# Patient Record
Sex: MALE | Race: WHITE | NOT HISPANIC OR LATINO | Employment: FULL TIME | URBAN - METROPOLITAN AREA
[De-identification: names, ages, dates, MRNs, and addresses within clinical notes are randomized per-mention and may not be internally consistent; named-entity substitution may affect disease eponyms.]

---

## 2017-01-24 ENCOUNTER — HOSPITAL ENCOUNTER (OUTPATIENT)
Dept: LAB | Facility: HOSPITAL | Age: 52
Setting detail: SPECIMEN
Discharge: HOME | End: 2017-01-24
Attending: INTERNAL MEDICINE | Admitting: INTERNAL MEDICINE

## 2017-01-30 ENCOUNTER — CONVERSION ENCOUNTER (OUTPATIENT)
Dept: CARDIOLOGY | Facility: CLINIC | Age: 52
End: 2017-01-30

## 2018-03-11 ENCOUNTER — HOSPITAL ENCOUNTER (INPATIENT)
Facility: HOSPITAL | Age: 53
LOS: 8 days | Discharge: HOME | End: 2018-03-20
Attending: EMERGENCY MEDICINE | Admitting: INTERNAL MEDICINE

## 2018-03-11 ENCOUNTER — APPOINTMENT (OUTPATIENT)
Dept: GENERAL RADIOLOGY | Facility: HOSPITAL | Age: 53
End: 2018-03-11

## 2018-03-11 DIAGNOSIS — I46.9 CARDIAC ARREST WITH VENTRICULAR FIBRILLATION (HCC): Primary | ICD-10-CM

## 2018-03-11 DIAGNOSIS — F19.10 SUBSTANCE ABUSE (HCC): ICD-10-CM

## 2018-03-11 DIAGNOSIS — R41.82 ALTERED MENTAL STATUS, UNSPECIFIED ALTERED MENTAL STATUS TYPE: ICD-10-CM

## 2018-03-11 DIAGNOSIS — E87.20 LACTIC ACIDOSIS: ICD-10-CM

## 2018-03-11 DIAGNOSIS — I48.91 ATRIAL FIBRILLATION, UNSPECIFIED TYPE (HCC): ICD-10-CM

## 2018-03-11 DIAGNOSIS — I49.01 CARDIAC ARREST WITH VENTRICULAR FIBRILLATION (HCC): Primary | ICD-10-CM

## 2018-03-11 PROCEDURE — 99291 CRITICAL CARE FIRST HOUR: CPT

## 2018-03-11 PROCEDURE — 25010000002 EPINEPHRINE PF 1 MG/10ML SOLUTION PREFILLED SYRINGE: Performed by: EMERGENCY MEDICINE

## 2018-03-11 PROCEDURE — 92950 HEART/LUNG RESUSCITATION CPR: CPT

## 2018-03-11 PROCEDURE — 94799 UNLISTED PULMONARY SVC/PX: CPT

## 2018-03-11 PROCEDURE — 93005 ELECTROCARDIOGRAM TRACING: CPT | Performed by: EMERGENCY MEDICINE

## 2018-03-11 PROCEDURE — 71045 X-RAY EXAM CHEST 1 VIEW: CPT

## 2018-03-11 PROCEDURE — 51703 INSERT BLADDER CATH COMPLEX: CPT

## 2018-03-11 PROCEDURE — 5A1945Z RESPIRATORY VENTILATION, 24-96 CONSECUTIVE HOURS: ICD-10-PCS | Performed by: INTERNAL MEDICINE

## 2018-03-11 PROCEDURE — 94002 VENT MGMT INPAT INIT DAY: CPT

## 2018-03-11 PROCEDURE — 25010000002 AMIODARONE PER 30 MG: Performed by: EMERGENCY MEDICINE

## 2018-03-11 PROCEDURE — 0BH17EZ INSERTION OF ENDOTRACHEAL AIRWAY INTO TRACHEA, VIA NATURAL OR ARTIFICIAL OPENING: ICD-10-PCS | Performed by: INTERNAL MEDICINE

## 2018-03-11 PROCEDURE — 25010000002 MAGNESIUM SULFATE PER 500 MG OF MAGNESIUM: Performed by: EMERGENCY MEDICINE

## 2018-03-11 RX ORDER — DEXTROSE MONOHYDRATE 25 G/50ML
INJECTION, SOLUTION INTRAVENOUS
Status: COMPLETED | OUTPATIENT
Start: 2018-03-11 | End: 2018-03-11

## 2018-03-11 RX ORDER — SODIUM CHLORIDE 0.9 % (FLUSH) 0.9 %
10 SYRINGE (ML) INJECTION AS NEEDED
Status: DISCONTINUED | OUTPATIENT
Start: 2018-03-11 | End: 2018-03-20 | Stop reason: HOSPADM

## 2018-03-11 RX ORDER — SODIUM CHLORIDE 9 MG/ML
INJECTION, SOLUTION INTRAVENOUS
Status: COMPLETED | OUTPATIENT
Start: 2018-03-11 | End: 2018-03-11

## 2018-03-11 RX ADMIN — MAGNESIUM SULFATE HEPTAHYDRATE 2 G: 500 INJECTION, SOLUTION INTRAMUSCULAR; INTRAVENOUS at 23:34

## 2018-03-11 RX ADMIN — EPINEPHRINE 1 MG: 0.1 INJECTION, SOLUTION ENDOTRACHEAL; INTRACARDIAC; INTRAVENOUS at 23:28

## 2018-03-11 RX ADMIN — SODIUM CHLORIDE 1000 ML/HR: 9 INJECTION, SOLUTION INTRAVENOUS at 23:21

## 2018-03-11 RX ADMIN — EPINEPHRINE 1 MG: 0.1 INJECTION, SOLUTION ENDOTRACHEAL; INTRACARDIAC; INTRAVENOUS at 23:23

## 2018-03-11 RX ADMIN — AMIODARONE HYDROCHLORIDE 300 MG: 50 INJECTION, SOLUTION INTRAVENOUS at 23:27

## 2018-03-11 RX ADMIN — SODIUM CHLORIDE 1000 ML/HR: 9 INJECTION, SOLUTION INTRAVENOUS at 23:51

## 2018-03-11 RX ADMIN — DEXTROSE MONOHYDRATE 25 ML: 25 INJECTION, SOLUTION INTRAVENOUS at 23:22

## 2018-03-12 ENCOUNTER — APPOINTMENT (OUTPATIENT)
Dept: GENERAL RADIOLOGY | Facility: HOSPITAL | Age: 53
End: 2018-03-12
Attending: INTERNAL MEDICINE

## 2018-03-12 ENCOUNTER — APPOINTMENT (OUTPATIENT)
Dept: CT IMAGING | Facility: HOSPITAL | Age: 53
End: 2018-03-12

## 2018-03-12 ENCOUNTER — APPOINTMENT (OUTPATIENT)
Dept: GENERAL RADIOLOGY | Facility: HOSPITAL | Age: 53
End: 2018-03-12

## 2018-03-12 ENCOUNTER — APPOINTMENT (OUTPATIENT)
Dept: CARDIOLOGY | Facility: HOSPITAL | Age: 53
End: 2018-03-12
Attending: INTERNAL MEDICINE

## 2018-03-12 PROBLEM — I49.01 CARDIAC ARREST WITH VENTRICULAR FIBRILLATION (HCC): Status: ACTIVE | Noted: 2018-03-12

## 2018-03-12 PROBLEM — I46.9 CARDIAC ARREST WITH VENTRICULAR FIBRILLATION (HCC): Status: ACTIVE | Noted: 2018-03-12

## 2018-03-12 LAB
ALBUMIN SERPL-MCNC: 3.2 G/DL (ref 3.5–5.2)
ALBUMIN SERPL-MCNC: 3.7 G/DL (ref 3.5–5.2)
ALBUMIN SERPL-MCNC: 3.7 G/DL (ref 3.5–5.2)
ALBUMIN SERPL-MCNC: 3.9 G/DL (ref 3.5–5.2)
ALBUMIN/GLOB SERPL: 1.3 G/DL
ALBUMIN/GLOB SERPL: 1.6 G/DL
ALP SERPL-CCNC: 96 U/L (ref 39–117)
ALP SERPL-CCNC: 99 U/L (ref 39–117)
ALT SERPL W P-5'-P-CCNC: 130 U/L (ref 1–41)
ALT SERPL W P-5'-P-CCNC: 140 U/L (ref 1–41)
AMPHET+METHAMPHET UR QL: NEGATIVE
ANION GAP SERPL CALCULATED.3IONS-SCNC: 15.3 MMOL/L
ANION GAP SERPL CALCULATED.3IONS-SCNC: 18.6 MMOL/L
ANION GAP SERPL CALCULATED.3IONS-SCNC: 18.8 MMOL/L
ANION GAP SERPL CALCULATED.3IONS-SCNC: 26.3 MMOL/L
AORTIC ARCH: 3.2 CM
AORTIC DIMENSIONLESS INDEX: 0.6 (DI)
APTT PPP: 35.4 SECONDS (ref 22.7–35.4)
APTT PPP: 35.6 SECONDS (ref 22.7–35.4)
ARTERIAL PATENCY WRIST A: POSITIVE
ARTERIAL PATENCY WRIST A: POSITIVE
ASCENDING AORTA: 3.4 CM
AST SERPL-CCNC: 152 U/L (ref 1–40)
AST SERPL-CCNC: 180 U/L (ref 1–40)
ATMOSPHERIC PRESS: 748.3 MMHG
ATMOSPHERIC PRESS: 750.7 MMHG
BARBITURATES UR QL SCN: NEGATIVE
BASE EXCESS BLDA CALC-SCNC: -18.8 MMOL/L (ref 0–2)
BASE EXCESS BLDA CALC-SCNC: -8.6 MMOL/L (ref 0–2)
BASOPHILS # BLD AUTO: 0.07 10*3/MM3 (ref 0–0.2)
BASOPHILS # BLD AUTO: 0.09 10*3/MM3 (ref 0–0.2)
BASOPHILS NFR BLD AUTO: 0.4 % (ref 0–1.5)
BASOPHILS NFR BLD AUTO: 0.5 % (ref 0–1.5)
BDY SITE: ABNORMAL
BDY SITE: ABNORMAL
BENZODIAZ UR QL SCN: NEGATIVE
BH CV ECHO MEAS - ACS: 2.8 CM
BH CV ECHO MEAS - AO ARCH DIAM (PROXIMAL TRANS.): 3.2 CM
BH CV ECHO MEAS - AO MEAN PG (FULL): 1 MMHG
BH CV ECHO MEAS - AO MEAN PG: 3 MMHG
BH CV ECHO MEAS - AO ROOT AREA (BSA CORRECTED): 1.3
BH CV ECHO MEAS - AO ROOT AREA: 10.2 CM^2
BH CV ECHO MEAS - AO ROOT DIAM: 3.6 CM
BH CV ECHO MEAS - AO V2 MAX: 116 CM/SEC
BH CV ECHO MEAS - AO V2 MEAN: 80.2 CM/SEC
BH CV ECHO MEAS - AO V2 VTI: 22.2 CM
BH CV ECHO MEAS - ASC AORTA: 3.4 CM
BH CV ECHO MEAS - AVA(I,A): 2.8 CM^2
BH CV ECHO MEAS - AVA(I,D): 2.8 CM^2
BH CV ECHO MEAS - BSA(HAYCOCK): 2.9 M^2
BH CV ECHO MEAS - BSA: 2.8 M^2
BH CV ECHO MEAS - BZI_BMI: 35.4 KILOGRAMS/M^2
BH CV ECHO MEAS - BZI_METRIC_HEIGHT: 200.7 CM
BH CV ECHO MEAS - BZI_METRIC_WEIGHT: 142.4 KG
BH CV ECHO MEAS - CONTRAST EF (2CH): 15.3 ML/M^2
BH CV ECHO MEAS - CONTRAST EF 4CH: 13.5 ML/M^2
BH CV ECHO MEAS - EDV(CUBED): 262.1 ML
BH CV ECHO MEAS - EDV(MOD-SP2): 261 ML
BH CV ECHO MEAS - EDV(MOD-SP4): 296 ML
BH CV ECHO MEAS - EDV(TEICH): 208.5 ML
BH CV ECHO MEAS - EF(CUBED): 17.6 %
BH CV ECHO MEAS - EF(MOD-SP2): 15.3 %
BH CV ECHO MEAS - EF(MOD-SP4): 13.5 %
BH CV ECHO MEAS - EF(TEICH): 13.7 %
BH CV ECHO MEAS - ESV(CUBED): 216 ML
BH CV ECHO MEAS - ESV(MOD-SP2): 221 ML
BH CV ECHO MEAS - ESV(MOD-SP4): 256 ML
BH CV ECHO MEAS - ESV(TEICH): 180 ML
BH CV ECHO MEAS - FS: 6.3 %
BH CV ECHO MEAS - IVS/LVPW: 1
BH CV ECHO MEAS - IVSD: 1.3 CM
BH CV ECHO MEAS - LV DIASTOLIC VOL/BSA (35-75): 107.2 ML/M^2
BH CV ECHO MEAS - LV MASS(C)D: 389 GRAMS
BH CV ECHO MEAS - LV MASS(C)DI: 140.9 GRAMS/M^2
BH CV ECHO MEAS - LV MEAN PG: 2 MMHG
BH CV ECHO MEAS - LV SYSTOLIC VOL/BSA (12-30): 92.7 ML/M^2
BH CV ECHO MEAS - LV V1 MAX: 86 CM/SEC
BH CV ECHO MEAS - LV V1 MEAN: 59 CM/SEC
BH CV ECHO MEAS - LV V1 VTI: 13.5 CM
BH CV ECHO MEAS - LVIDD: 6.4 CM
BH CV ECHO MEAS - LVIDS: 6 CM
BH CV ECHO MEAS - LVLD AP2: 9.5 CM
BH CV ECHO MEAS - LVLD AP4: 9.2 CM
BH CV ECHO MEAS - LVLS AP2: 8.9 CM
BH CV ECHO MEAS - LVLS AP4: 10.2 CM
BH CV ECHO MEAS - LVOT AREA (M): 4.5 CM^2
BH CV ECHO MEAS - LVOT AREA: 4.5 CM^2
BH CV ECHO MEAS - LVOT DIAM: 2.4 CM
BH CV ECHO MEAS - LVPWD: 1.3 CM
BH CV ECHO MEAS - MV DEC SLOPE: 373 CM/SEC^2
BH CV ECHO MEAS - MV DEC TIME: 0.25 SEC
BH CV ECHO MEAS - MV E MAX VEL: 53.3 CM/SEC
BH CV ECHO MEAS - MV MEAN PG: 1 MMHG
BH CV ECHO MEAS - MV P1/2T MAX VEL: 59.7 CM/SEC
BH CV ECHO MEAS - MV P1/2T: 46.9 MSEC
BH CV ECHO MEAS - MV V2 MEAN: 45.8 CM/SEC
BH CV ECHO MEAS - MV V2 VTI: 9.3 CM
BH CV ECHO MEAS - MVA P1/2T LCG: 3.7 CM^2
BH CV ECHO MEAS - MVA(P1/2T): 4.7 CM^2
BH CV ECHO MEAS - MVA(VTI): 6.6 CM^2
BH CV ECHO MEAS - PA ACC SLOPE: 12.1 CM/SEC^2
BH CV ECHO MEAS - PA ACC TIME: 0.09 SEC
BH CV ECHO MEAS - PA MAX PG (FULL): -0.12 MMHG
BH CV ECHO MEAS - PA MAX PG: 1.6 MMHG
BH CV ECHO MEAS - PA PR(ACCEL): 38.5 MMHG
BH CV ECHO MEAS - PA V2 MAX: 63 CM/SEC
BH CV ECHO MEAS - PVA(V,A): 3.9 CM^2
BH CV ECHO MEAS - PVA(V,D): 3.9 CM^2
BH CV ECHO MEAS - QP/QS: 0.78
BH CV ECHO MEAS - RV MAX PG: 1.7 MMHG
BH CV ECHO MEAS - RV MEAN PG: 1 MMHG
BH CV ECHO MEAS - RV V1 MAX: 65.4 CM/SEC
BH CV ECHO MEAS - RV V1 MEAN: 48.3 CM/SEC
BH CV ECHO MEAS - RV V1 VTI: 12.6 CM
BH CV ECHO MEAS - RVOT AREA: 3.8 CM^2
BH CV ECHO MEAS - RVOT DIAM: 2.2 CM
BH CV ECHO MEAS - SI(AO): 81.9 ML/M^2
BH CV ECHO MEAS - SI(CUBED): 16.7 ML/M^2
BH CV ECHO MEAS - SI(LVOT): 22.1 ML/M^2
BH CV ECHO MEAS - SI(MOD-SP2): 14.5 ML/M^2
BH CV ECHO MEAS - SI(MOD-SP4): 14.5 ML/M^2
BH CV ECHO MEAS - SI(TEICH): 10.3 ML/M^2
BH CV ECHO MEAS - SV(AO): 226 ML
BH CV ECHO MEAS - SV(CUBED): 46.1 ML
BH CV ECHO MEAS - SV(LVOT): 61.1 ML
BH CV ECHO MEAS - SV(MOD-SP2): 40 ML
BH CV ECHO MEAS - SV(MOD-SP4): 40 ML
BH CV ECHO MEAS - SV(RVOT): 47.9 ML
BH CV ECHO MEAS - SV(TEICH): 28.5 ML
BH CV ECHO MEAS - TAPSE (>1.6): 2.6 CM2
BH CV VAS BP RIGHT ARM: NORMAL MMHG
BH CV XLRA - RV BASE: 4 CM
BH CV XLRA - TDI S': 13.7 CM/SEC
BILIRUB SERPL-MCNC: 0.6 MG/DL (ref 0.1–1.2)
BILIRUB SERPL-MCNC: 1 MG/DL (ref 0.1–1.2)
BUN BLD-MCNC: 23 MG/DL (ref 6–20)
BUN BLD-MCNC: 32 MG/DL (ref 6–20)
BUN BLD-MCNC: 39 MG/DL (ref 6–20)
BUN BLD-MCNC: 46 MG/DL (ref 6–20)
BUN/CREAT SERPL: 11.9 (ref 7–25)
BUN/CREAT SERPL: 14.2 (ref 7–25)
BUN/CREAT SERPL: 16.5 (ref 7–25)
BUN/CREAT SERPL: 19.2 (ref 7–25)
CA-I BLD-MCNC: 4.7 MG/DL (ref 4.6–5.4)
CA-I SERPL ISE-MCNC: 1.17 MMOL/L (ref 1.15–1.35)
CALCIUM SPEC-SCNC: 7.7 MG/DL (ref 8.6–10.5)
CALCIUM SPEC-SCNC: 8 MG/DL (ref 8.6–10.5)
CALCIUM SPEC-SCNC: 8.1 MG/DL (ref 8.6–10.5)
CALCIUM SPEC-SCNC: 8.5 MG/DL (ref 8.6–10.5)
CANNABINOIDS SERPL QL: POSITIVE
CHLORIDE SERPL-SCNC: 100 MMOL/L (ref 98–107)
CHLORIDE SERPL-SCNC: 100 MMOL/L (ref 98–107)
CHLORIDE SERPL-SCNC: 94 MMOL/L (ref 98–107)
CHLORIDE SERPL-SCNC: 96 MMOL/L (ref 98–107)
CHLORIDE UR-SCNC: 38 MMOL/L
CO2 SERPL-SCNC: 16.2 MMOL/L (ref 22–29)
CO2 SERPL-SCNC: 16.7 MMOL/L (ref 22–29)
CO2 SERPL-SCNC: 17.4 MMOL/L (ref 22–29)
CO2 SERPL-SCNC: 20.7 MMOL/L (ref 22–29)
COCAINE UR QL: POSITIVE
CREAT BLD-MCNC: 1.93 MG/DL (ref 0.76–1.27)
CREAT BLD-MCNC: 2.25 MG/DL (ref 0.76–1.27)
CREAT BLD-MCNC: 2.36 MG/DL (ref 0.76–1.27)
CREAT BLD-MCNC: 2.39 MG/DL (ref 0.76–1.27)
CREAT UR-MCNC: 65 MG/DL
D-LACTATE SERPL-SCNC: 2.1 MMOL/L (ref 0.5–2)
D-LACTATE SERPL-SCNC: 3.8 MMOL/L (ref 0.5–2)
D-LACTATE SERPL-SCNC: 5.6 MMOL/L (ref 0.5–2)
D-LACTATE SERPL-SCNC: 9.4 MMOL/L (ref 0.5–2)
DEPRECATED RDW RBC AUTO: 48.3 FL (ref 37–54)
DEPRECATED RDW RBC AUTO: 49.7 FL (ref 37–54)
DEPRECATED RDW RBC AUTO: 50 FL (ref 37–54)
EOSINOPHIL # BLD AUTO: 0.2 10*3/MM3 (ref 0–0.7)
EOSINOPHIL # BLD AUTO: 0.24 10*3/MM3 (ref 0–0.7)
EOSINOPHIL NFR BLD AUTO: 1.2 % (ref 0.3–6.2)
EOSINOPHIL NFR BLD AUTO: 1.4 % (ref 0.3–6.2)
ERYTHROCYTE [DISTWIDTH] IN BLOOD BY AUTOMATED COUNT: 13 % (ref 11.5–14.5)
ERYTHROCYTE [DISTWIDTH] IN BLOOD BY AUTOMATED COUNT: 13 % (ref 11.5–14.5)
ERYTHROCYTE [DISTWIDTH] IN BLOOD BY AUTOMATED COUNT: 13.2 % (ref 11.5–14.5)
ETHANOL BLD-MCNC: <10 MG/DL (ref 0–10)
ETHANOL UR QL: <0.01 %
GFR SERPL CREATININE-BSD FRML MDRD: 29 ML/MIN/1.73
GFR SERPL CREATININE-BSD FRML MDRD: 29 ML/MIN/1.73
GFR SERPL CREATININE-BSD FRML MDRD: 31 ML/MIN/1.73
GFR SERPL CREATININE-BSD FRML MDRD: 37 ML/MIN/1.73
GLOBULIN UR ELPH-MCNC: 2.5 GM/DL
GLOBULIN UR ELPH-MCNC: 2.8 GM/DL
GLUCOSE BLD-MCNC: 274 MG/DL (ref 65–99)
GLUCOSE BLD-MCNC: 498 MG/DL (ref 65–99)
GLUCOSE BLD-MCNC: 544 MG/DL (ref 65–99)
GLUCOSE BLD-MCNC: 552 MG/DL (ref 65–99)
GLUCOSE BLDC GLUCOMTR-MCNC: 164 MG/DL (ref 70–130)
GLUCOSE BLDC GLUCOMTR-MCNC: 172 MG/DL (ref 70–130)
GLUCOSE BLDC GLUCOMTR-MCNC: 179 MG/DL (ref 70–130)
GLUCOSE BLDC GLUCOMTR-MCNC: 182 MG/DL (ref 70–130)
GLUCOSE BLDC GLUCOMTR-MCNC: 202 MG/DL (ref 70–130)
GLUCOSE BLDC GLUCOMTR-MCNC: 253 MG/DL (ref 70–130)
GLUCOSE BLDC GLUCOMTR-MCNC: 279 MG/DL (ref 70–130)
GLUCOSE BLDC GLUCOMTR-MCNC: 328 MG/DL (ref 70–130)
GLUCOSE BLDC GLUCOMTR-MCNC: 333 MG/DL (ref 70–130)
GLUCOSE BLDC GLUCOMTR-MCNC: 369 MG/DL (ref 70–130)
GLUCOSE BLDC GLUCOMTR-MCNC: 422 MG/DL (ref 70–130)
GLUCOSE BLDC GLUCOMTR-MCNC: 437 MG/DL (ref 70–130)
GLUCOSE BLDC GLUCOMTR-MCNC: 478 MG/DL (ref 70–130)
GLUCOSE BLDC GLUCOMTR-MCNC: 506 MG/DL (ref 70–130)
HAV IGM SERPL QL IA: NORMAL
HBV CORE IGM SERPL QL IA: NORMAL
HBV SURFACE AG SERPL QL IA: NORMAL
HCO3 BLDA-SCNC: 14.8 MMOL/L (ref 22–28)
HCO3 BLDA-SCNC: 17.8 MMOL/L (ref 22–28)
HCT VFR BLD AUTO: 46.3 % (ref 40.4–52.2)
HCT VFR BLD AUTO: 48.4 % (ref 40.4–52.2)
HCT VFR BLD AUTO: 49.3 % (ref 40.4–52.2)
HCT VFR BLD AUTO: 50.2 % (ref 40.4–52.2)
HCV AB SER DONR QL: NORMAL
HGB BLD-MCNC: 15.3 G/DL (ref 13.7–17.6)
HGB BLD-MCNC: 15.5 G/DL (ref 13.7–17.6)
HGB BLD-MCNC: 15.6 G/DL (ref 13.7–17.6)
HGB BLD-MCNC: 16.7 G/DL (ref 13.7–17.6)
HOLD SPECIMEN: NORMAL
HOROWITZ INDEX BLD+IHG-RTO: 100 %
HOROWITZ INDEX BLD+IHG-RTO: 40 %
IMM GRANULOCYTES # BLD: 0.3 10*3/MM3 (ref 0–0.03)
IMM GRANULOCYTES # BLD: 0.32 10*3/MM3 (ref 0–0.03)
IMM GRANULOCYTES NFR BLD: 1.9 % (ref 0–0.5)
IMM GRANULOCYTES NFR BLD: 1.9 % (ref 0–0.5)
INR PPP: 1.24 (ref 0.9–1.1)
INR PPP: 1.29 (ref 0.9–1.1)
INR PPP: 1.3 (ref 0.9–1.1)
LEFT ATRIUM VOLUME INDEX: 37 ML/M2
LV EF 2D ECHO EST: 14 %
LYMPHOCYTES # BLD AUTO: 5.21 10*3/MM3 (ref 0.9–4.8)
LYMPHOCYTES # BLD AUTO: 5.85 10*3/MM3 (ref 0.9–4.8)
LYMPHOCYTES NFR BLD AUTO: 32.4 % (ref 19.6–45.3)
LYMPHOCYTES NFR BLD AUTO: 34.3 % (ref 19.6–45.3)
MAGNESIUM SERPL-MCNC: 2.9 MG/DL (ref 1.6–2.6)
MAXIMAL PREDICTED HEART RATE: 169 BPM
MCH RBC QN AUTO: 33.1 PG (ref 27–32.7)
MCH RBC QN AUTO: 33.1 PG (ref 27–32.7)
MCH RBC QN AUTO: 33.7 PG (ref 27–32.7)
MCHC RBC AUTO-ENTMCNC: 31.6 G/DL (ref 32.6–36.4)
MCHC RBC AUTO-ENTMCNC: 31.6 G/DL (ref 32.6–36.4)
MCHC RBC AUTO-ENTMCNC: 33.3 G/DL (ref 32.6–36.4)
MCV RBC AUTO: 101.2 FL (ref 79.8–96.2)
MCV RBC AUTO: 104.7 FL (ref 79.8–96.2)
MCV RBC AUTO: 104.8 FL (ref 79.8–96.2)
METHADONE UR QL SCN: NEGATIVE
MODALITY: ABNORMAL
MODALITY: ABNORMAL
MONOCYTES # BLD AUTO: 0.63 10*3/MM3 (ref 0.2–1.2)
MONOCYTES # BLD AUTO: 0.66 10*3/MM3 (ref 0.2–1.2)
MONOCYTES NFR BLD AUTO: 3.9 % (ref 5–12)
MONOCYTES NFR BLD AUTO: 3.9 % (ref 5–12)
NEUTROPHILS # BLD AUTO: 9.65 10*3/MM3 (ref 1.9–8.1)
NEUTROPHILS # BLD AUTO: 9.88 10*3/MM3 (ref 1.9–8.1)
NEUTROPHILS NFR BLD AUTO: 58 % (ref 42.7–76)
NEUTROPHILS NFR BLD AUTO: 60.2 % (ref 42.7–76)
NT-PROBNP SERPL-MCNC: 1533 PG/ML (ref 0–900)
NT-PROBNP SERPL-MCNC: 1721 PG/ML (ref 5–900)
O2 A-A PPRESDIFF RESPIRATORY: 0.1 MMHG
O2 A-A PPRESDIFF RESPIRATORY: 0.3 MMHG
OPIATES UR QL: POSITIVE
OXYCODONE UR QL SCN: NEGATIVE
PCO2 BLDA: 39.1 MM HG (ref 35–45)
PCO2 BLDA: 68.5 MM HG (ref 35–45)
PEEP RESPIRATORY: 5 CM[H2O]
PEEP RESPIRATORY: 5 CM[H2O]
PH BLDA: 6.94 PH UNITS (ref 7.35–7.45)
PH BLDA: 7.27 PH UNITS (ref 7.35–7.45)
PHOSPHATE SERPL-MCNC: 3.3 MG/DL (ref 2.5–4.5)
PHOSPHATE SERPL-MCNC: 3.3 MG/DL (ref 2.5–4.5)
PHOSPHATE SERPL-MCNC: 9 MG/DL (ref 2.5–4.5)
PLATELET # BLD AUTO: 256 10*3/MM3 (ref 140–500)
PLATELET # BLD AUTO: 265 10*3/MM3 (ref 140–500)
PLATELET # BLD AUTO: 294 10*3/MM3 (ref 140–500)
PMV BLD AUTO: 11.2 FL (ref 6–12)
PMV BLD AUTO: 11.3 FL (ref 6–12)
PMV BLD AUTO: 11.4 FL (ref 6–12)
PO2 BLDA: 67.4 MM HG (ref 80–100)
PO2 BLDA: 82.6 MM HG (ref 80–100)
POTASSIUM BLD-SCNC: 4 MMOL/L (ref 3.5–5.2)
POTASSIUM BLD-SCNC: 4.3 MMOL/L (ref 3.5–5.2)
POTASSIUM BLD-SCNC: 5.1 MMOL/L (ref 3.5–5.2)
POTASSIUM BLD-SCNC: 7 MMOL/L (ref 3.5–5.2)
PROT SERPL-MCNC: 6.4 G/DL (ref 6–8.5)
PROT SERPL-MCNC: 6.5 G/DL (ref 6–8.5)
PROT UR-MCNC: 32 MG/DL
PROT/CREAT UR: 492.3 MG/G CREA
PROTHROMBIN TIME: 15.4 SECONDS (ref 11.7–14.2)
PROTHROMBIN TIME: 15.9 SECONDS (ref 11.7–14.2)
PROTHROMBIN TIME: 16 SECONDS (ref 11.7–14.2)
RBC # BLD AUTO: 4.62 10*6/MM3 (ref 4.6–6)
RBC # BLD AUTO: 4.71 10*6/MM3 (ref 4.6–6)
RBC # BLD AUTO: 4.96 10*6/MM3 (ref 4.6–6)
SAO2 % BLDCOA: 77.3 % (ref 92–99)
SAO2 % BLDCOA: 94.4 % (ref 92–99)
SET MECH RESP RATE: 10
SET MECH RESP RATE: 18
SODIUM BLD-SCNC: 132 MMOL/L (ref 136–145)
SODIUM BLD-SCNC: 135 MMOL/L (ref 136–145)
SODIUM BLD-SCNC: 136 MMOL/L (ref 136–145)
SODIUM BLD-SCNC: 137 MMOL/L (ref 136–145)
SODIUM UR-SCNC: 51 MMOL/L
STRESS TARGET HR: 144 BPM
TOTAL RATE: 34 BREATHS/MINUTE
TOTAL RATE: 35 BREATHS/MINUTE
TROPONIN T SERPL-MCNC: <0.01 NG/ML (ref 0–0.03)
VENTILATOR MODE: ABNORMAL
VENTILATOR MODE: ABNORMAL
VT ON VENT VENT: 500 ML
VT ON VENT VENT: 684 ML
WBC NRBC COR # BLD: 16.06 10*3/MM3 (ref 4.5–10.7)
WBC NRBC COR # BLD: 17.04 10*3/MM3 (ref 4.5–10.7)
WBC NRBC COR # BLD: 27.27 10*3/MM3 (ref 4.5–10.7)

## 2018-03-12 PROCEDURE — 93010 ELECTROCARDIOGRAM REPORT: CPT | Performed by: INTERNAL MEDICINE

## 2018-03-12 PROCEDURE — 80307 DRUG TEST PRSMV CHEM ANLYZR: CPT | Performed by: EMERGENCY MEDICINE

## 2018-03-12 PROCEDURE — 94640 AIRWAY INHALATION TREATMENT: CPT

## 2018-03-12 PROCEDURE — 85018 HEMOGLOBIN: CPT | Performed by: INTERNAL MEDICINE

## 2018-03-12 PROCEDURE — 25010000002 ONDANSETRON PER 1 MG: Performed by: INTERNAL MEDICINE

## 2018-03-12 PROCEDURE — 85610 PROTHROMBIN TIME: CPT | Performed by: EMERGENCY MEDICINE

## 2018-03-12 PROCEDURE — 74018 RADEX ABDOMEN 1 VIEW: CPT

## 2018-03-12 PROCEDURE — 99253 IP/OBS CNSLTJ NEW/EST LOW 45: CPT | Performed by: INTERNAL MEDICINE

## 2018-03-12 PROCEDURE — 83605 ASSAY OF LACTIC ACID: CPT | Performed by: EMERGENCY MEDICINE

## 2018-03-12 PROCEDURE — 25010000002 FENTANYL CITRATE (PF) 100 MCG/2ML SOLUTION: Performed by: INTERNAL MEDICINE

## 2018-03-12 PROCEDURE — 80053 COMPREHEN METABOLIC PANEL: CPT | Performed by: EMERGENCY MEDICINE

## 2018-03-12 PROCEDURE — 85014 HEMATOCRIT: CPT | Performed by: INTERNAL MEDICINE

## 2018-03-12 PROCEDURE — 25010000002 PHENYLEPHRINE 10 MG/ML SOLUTION: Performed by: INTERNAL MEDICINE

## 2018-03-12 PROCEDURE — 94799 UNLISTED PULMONARY SVC/PX: CPT

## 2018-03-12 PROCEDURE — 82803 BLOOD GASES ANY COMBINATION: CPT

## 2018-03-12 PROCEDURE — 84484 ASSAY OF TROPONIN QUANT: CPT | Performed by: EMERGENCY MEDICINE

## 2018-03-12 PROCEDURE — 99233 SBSQ HOSP IP/OBS HIGH 50: CPT | Performed by: INTERNAL MEDICINE

## 2018-03-12 PROCEDURE — 84300 ASSAY OF URINE SODIUM: CPT | Performed by: INTERNAL MEDICINE

## 2018-03-12 PROCEDURE — 25010000002 MORPHINE PER 10 MG: Performed by: EMERGENCY MEDICINE

## 2018-03-12 PROCEDURE — 93306 TTE W/DOPPLER COMPLETE: CPT | Performed by: INTERNAL MEDICINE

## 2018-03-12 PROCEDURE — 93306 TTE W/DOPPLER COMPLETE: CPT

## 2018-03-12 PROCEDURE — 83605 ASSAY OF LACTIC ACID: CPT | Performed by: INTERNAL MEDICINE

## 2018-03-12 PROCEDURE — 82570 ASSAY OF URINE CREATININE: CPT | Performed by: INTERNAL MEDICINE

## 2018-03-12 PROCEDURE — 25010000002 PROPOFOL 10 MG/ML EMULSION

## 2018-03-12 PROCEDURE — 83880 ASSAY OF NATRIURETIC PEPTIDE: CPT | Performed by: INTERNAL MEDICINE

## 2018-03-12 PROCEDURE — 84156 ASSAY OF PROTEIN URINE: CPT | Performed by: INTERNAL MEDICINE

## 2018-03-12 PROCEDURE — 36600 WITHDRAWAL OF ARTERIAL BLOOD: CPT

## 2018-03-12 PROCEDURE — 25010000002 PROPOFOL 1000 MG/ML EMULSION: Performed by: EMERGENCY MEDICINE

## 2018-03-12 PROCEDURE — 25010000002 PIPERACILLIN SOD-TAZOBACTAM PER 1 G: Performed by: INTERNAL MEDICINE

## 2018-03-12 PROCEDURE — 25010000002 METOCLOPRAMIDE PER 10 MG: Performed by: INTERNAL MEDICINE

## 2018-03-12 PROCEDURE — 80053 COMPREHEN METABOLIC PANEL: CPT | Performed by: INTERNAL MEDICINE

## 2018-03-12 PROCEDURE — 84100 ASSAY OF PHOSPHORUS: CPT | Performed by: EMERGENCY MEDICINE

## 2018-03-12 PROCEDURE — 82436 ASSAY OF URINE CHLORIDE: CPT | Performed by: INTERNAL MEDICINE

## 2018-03-12 PROCEDURE — 85610 PROTHROMBIN TIME: CPT | Performed by: INTERNAL MEDICINE

## 2018-03-12 PROCEDURE — 83880 ASSAY OF NATRIURETIC PEPTIDE: CPT | Performed by: EMERGENCY MEDICINE

## 2018-03-12 PROCEDURE — 80074 ACUTE HEPATITIS PANEL: CPT | Performed by: INTERNAL MEDICINE

## 2018-03-12 PROCEDURE — 82962 GLUCOSE BLOOD TEST: CPT

## 2018-03-12 PROCEDURE — 93005 ELECTROCARDIOGRAM TRACING: CPT | Performed by: INTERNAL MEDICINE

## 2018-03-12 PROCEDURE — 85027 COMPLETE CBC AUTOMATED: CPT | Performed by: INTERNAL MEDICINE

## 2018-03-12 PROCEDURE — 99205 OFFICE O/P NEW HI 60 MIN: CPT | Performed by: INTERNAL MEDICINE

## 2018-03-12 PROCEDURE — 25010000002 CALCIUM GLUCONATE PER 10 ML: Performed by: INTERNAL MEDICINE

## 2018-03-12 PROCEDURE — 85730 THROMBOPLASTIN TIME PARTIAL: CPT | Performed by: EMERGENCY MEDICINE

## 2018-03-12 PROCEDURE — 70450 CT HEAD/BRAIN W/O DYE: CPT

## 2018-03-12 PROCEDURE — 82330 ASSAY OF CALCIUM: CPT | Performed by: EMERGENCY MEDICINE

## 2018-03-12 PROCEDURE — B548ZZA ULTRASONOGRAPHY OF SUPERIOR VENA CAVA, GUIDANCE: ICD-10-PCS | Performed by: INTERNAL MEDICINE

## 2018-03-12 PROCEDURE — 85025 COMPLETE CBC W/AUTO DIFF WBC: CPT | Performed by: EMERGENCY MEDICINE

## 2018-03-12 PROCEDURE — 31500 INSERT EMERGENCY AIRWAY: CPT | Performed by: EMERGENCY MEDICINE

## 2018-03-12 PROCEDURE — 63710000001 INSULIN ASPART PER 5 UNITS: Performed by: INTERNAL MEDICINE

## 2018-03-12 PROCEDURE — 83735 ASSAY OF MAGNESIUM: CPT | Performed by: EMERGENCY MEDICINE

## 2018-03-12 PROCEDURE — 02HV33Z INSERTION OF INFUSION DEVICE INTO SUPERIOR VENA CAVA, PERCUTANEOUS APPROACH: ICD-10-PCS | Performed by: INTERNAL MEDICINE

## 2018-03-12 PROCEDURE — 94003 VENT MGMT INPAT SUBQ DAY: CPT

## 2018-03-12 PROCEDURE — 80069 RENAL FUNCTION PANEL: CPT | Performed by: INTERNAL MEDICINE

## 2018-03-12 RX ORDER — IPRATROPIUM BROMIDE AND ALBUTEROL SULFATE 2.5; .5 MG/3ML; MG/3ML
3 SOLUTION RESPIRATORY (INHALATION) ONCE
Status: COMPLETED | OUTPATIENT
Start: 2018-03-12 | End: 2018-03-12

## 2018-03-12 RX ORDER — CALCIUM GLUCONATE 94 MG/ML
1 INJECTION, SOLUTION INTRAVENOUS ONCE
Status: COMPLETED | OUTPATIENT
Start: 2018-03-12 | End: 2018-03-12

## 2018-03-12 RX ORDER — DEXTROSE MONOHYDRATE 25 G/50ML
INJECTION, SOLUTION INTRAVENOUS
Status: DISPENSED
Start: 2018-03-12 | End: 2018-03-12

## 2018-03-12 RX ORDER — DILTIAZEM HYDROCHLORIDE 5 MG/ML
10 INJECTION INTRAVENOUS ONCE
Status: COMPLETED | OUTPATIENT
Start: 2018-03-12 | End: 2018-03-12

## 2018-03-12 RX ORDER — ONDANSETRON 2 MG/ML
4 INJECTION INTRAMUSCULAR; INTRAVENOUS EVERY 4 HOURS PRN
Status: DISCONTINUED | OUTPATIENT
Start: 2018-03-12 | End: 2018-03-14

## 2018-03-12 RX ORDER — SODIUM CHLORIDE 9 MG/ML
100 INJECTION, SOLUTION INTRAVENOUS CONTINUOUS
Status: DISCONTINUED | OUTPATIENT
Start: 2018-03-12 | End: 2018-03-13

## 2018-03-12 RX ORDER — FENTANYL CITRATE 50 UG/ML
50 INJECTION, SOLUTION INTRAMUSCULAR; INTRAVENOUS ONCE
Status: COMPLETED | OUTPATIENT
Start: 2018-03-12 | End: 2018-03-12

## 2018-03-12 RX ORDER — PANTOPRAZOLE SODIUM 40 MG/10ML
40 INJECTION, POWDER, LYOPHILIZED, FOR SOLUTION INTRAVENOUS
Status: DISCONTINUED | OUTPATIENT
Start: 2018-03-12 | End: 2018-03-16 | Stop reason: ALTCHOICE

## 2018-03-12 RX ORDER — METOCLOPRAMIDE HYDROCHLORIDE 5 MG/ML
10 INJECTION INTRAMUSCULAR; INTRAVENOUS ONCE
Status: COMPLETED | OUTPATIENT
Start: 2018-03-12 | End: 2018-03-12

## 2018-03-12 RX ORDER — PHENYLEPHRINE HCL IN 0.9% NACL 0.5 MG/5ML
.5-3 SYRINGE (ML) INTRAVENOUS
Status: DISCONTINUED | OUTPATIENT
Start: 2018-03-12 | End: 2018-03-14

## 2018-03-12 RX ORDER — SODIUM CHLORIDE 9 MG/ML
30 INJECTION, SOLUTION INTRAVENOUS CONTINUOUS
Status: DISCONTINUED | OUTPATIENT
Start: 2018-03-12 | End: 2018-03-13

## 2018-03-12 RX ORDER — MORPHINE SULFATE 2 MG/ML
2 INJECTION, SOLUTION INTRAMUSCULAR; INTRAVENOUS ONCE
Status: COMPLETED | OUTPATIENT
Start: 2018-03-12 | End: 2018-03-12

## 2018-03-12 RX ORDER — PROPOFOL 10 MG/ML
VIAL (ML) INTRAVENOUS
Status: COMPLETED
Start: 2018-03-12 | End: 2018-03-12

## 2018-03-12 RX ORDER — MAGNESIUM SULFATE HEPTAHYDRATE 500 MG/ML
INJECTION, SOLUTION INTRAMUSCULAR; INTRAVENOUS
Status: COMPLETED | OUTPATIENT
Start: 2018-03-11 | End: 2018-03-11

## 2018-03-12 RX ORDER — AMIODARONE HYDROCHLORIDE 50 MG/ML
INJECTION, SOLUTION INTRAVENOUS
Status: COMPLETED | OUTPATIENT
Start: 2018-03-11 | End: 2018-03-11

## 2018-03-12 RX ADMIN — MORPHINE SULFATE 2 MG: 2 INJECTION, SOLUTION INTRAMUSCULAR; INTRAVENOUS at 00:16

## 2018-03-12 RX ADMIN — Medication 1.2 MCG/KG/MIN: at 20:39

## 2018-03-12 RX ADMIN — SODIUM CHLORIDE 15 MG/HR: 900 INJECTION, SOLUTION INTRAVENOUS at 03:15

## 2018-03-12 RX ADMIN — METOCLOPRAMIDE 10 MG: 5 INJECTION, SOLUTION INTRAMUSCULAR; INTRAVENOUS at 07:02

## 2018-03-12 RX ADMIN — SODIUM CHLORIDE 5 MG/HR: 900 INJECTION, SOLUTION INTRAVENOUS at 02:38

## 2018-03-12 RX ADMIN — TAZOBACTAM SODIUM AND PIPERACILLIN SODIUM 3.38 G: 375; 3 INJECTION, SOLUTION INTRAVENOUS at 11:20

## 2018-03-12 RX ADMIN — PROPOFOL 40 MCG/KG/MIN: 10 INJECTION, EMULSION INTRAVENOUS at 07:59

## 2018-03-12 RX ADMIN — PROPOFOL 35 MCG/KG/MIN: 10 INJECTION, EMULSION INTRAVENOUS at 23:28

## 2018-03-12 RX ADMIN — DILTIAZEM HYDROCHLORIDE 10 MG: 5 INJECTION, SOLUTION INTRAVENOUS at 12:29

## 2018-03-12 RX ADMIN — PROPOFOL 5 MCG/KG/MIN: 10 INJECTION, EMULSION INTRAVENOUS at 00:00

## 2018-03-12 RX ADMIN — SODIUM BICARBONATE 100 ML/HR: 84 INJECTION, SOLUTION INTRAVENOUS at 20:08

## 2018-03-12 RX ADMIN — PANTOPRAZOLE SODIUM 40 MG: 40 INJECTION, POWDER, FOR SOLUTION INTRAVENOUS at 11:20

## 2018-03-12 RX ADMIN — PROPOFOL 25 MCG/KG/MIN: 10 INJECTION, EMULSION INTRAVENOUS at 20:10

## 2018-03-12 RX ADMIN — SODIUM CHLORIDE 30 ML/HR: 9 INJECTION, SOLUTION INTRAVENOUS at 16:32

## 2018-03-12 RX ADMIN — METOPROLOL TARTRATE 5 MG: 5 INJECTION, SOLUTION INTRAVENOUS at 00:16

## 2018-03-12 RX ADMIN — TAZOBACTAM SODIUM AND PIPERACILLIN SODIUM 3.38 G: 375; 3 INJECTION, SOLUTION INTRAVENOUS at 19:59

## 2018-03-12 RX ADMIN — SODIUM CHLORIDE 100 ML/HR: 9 INJECTION, SOLUTION INTRAVENOUS at 16:13

## 2018-03-12 RX ADMIN — PROPOFOL 25 MCG/KG/MIN: 10 INJECTION, EMULSION INTRAVENOUS at 16:12

## 2018-03-12 RX ADMIN — PROPOFOL 25 MCG/KG/MIN: 10 INJECTION, EMULSION INTRAVENOUS at 13:46

## 2018-03-12 RX ADMIN — INSULIN ASPART 15 UNITS: 100 INJECTION, SOLUTION INTRAVENOUS; SUBCUTANEOUS at 06:49

## 2018-03-12 RX ADMIN — SODIUM CHLORIDE 100 ML/HR: 9 INJECTION, SOLUTION INTRAVENOUS at 02:00

## 2018-03-12 RX ADMIN — SODIUM CHLORIDE 5 MG/HR: 900 INJECTION, SOLUTION INTRAVENOUS at 23:26

## 2018-03-12 RX ADMIN — SODIUM CHLORIDE 7.2 UNITS/HR: 9 INJECTION, SOLUTION INTRAVENOUS at 10:07

## 2018-03-12 RX ADMIN — SODIUM CHLORIDE 1000 ML: 9 INJECTION, SOLUTION INTRAVENOUS at 00:34

## 2018-03-12 RX ADMIN — SODIUM CHLORIDE 5.1 UNITS/HR: 9 INJECTION, SOLUTION INTRAVENOUS at 16:52

## 2018-03-12 RX ADMIN — ONDANSETRON 4 MG: 2 INJECTION INTRAMUSCULAR; INTRAVENOUS at 02:09

## 2018-03-12 RX ADMIN — Medication 1 MCG/KG/MIN: at 16:12

## 2018-03-12 RX ADMIN — CALCIUM GLUCONATE 1 G: 94 INJECTION, SOLUTION INTRAVENOUS at 09:50

## 2018-03-12 RX ADMIN — FENTANYL CITRATE 50 MCG: 50 INJECTION INTRAMUSCULAR; INTRAVENOUS at 02:35

## 2018-03-12 RX ADMIN — IPRATROPIUM BROMIDE AND ALBUTEROL SULFATE 3 ML: .5; 3 SOLUTION RESPIRATORY (INHALATION) at 08:47

## 2018-03-12 RX ADMIN — SODIUM CHLORIDE 5 MG/HR: 900 INJECTION, SOLUTION INTRAVENOUS at 16:20

## 2018-03-12 RX ADMIN — Medication 0.5 MCG/KG/MIN: at 14:53

## 2018-03-12 RX ADMIN — PROPOFOL 40 MCG/KG/MIN: 10 INJECTION, EMULSION INTRAVENOUS at 04:55

## 2018-03-12 RX ADMIN — SODIUM BICARBONATE 50 ML/HR: 84 INJECTION, SOLUTION INTRAVENOUS at 09:45

## 2018-03-12 NOTE — CONSULTS
"Adult Nutrition  Assessment/PES    Patient Name:  Shannan Arguelles  YOB: 1965  MRN: [id number]  Admit Date:  3/11/2018    Assessment Date:  3/12/2018    Comments:  Assessment completed.  Will follow for some form on nutrition as medical conditions allow.  If TF's are started, rec Vital 1.2 with goal at 60ml/hr.          Adult Nutrition Assessment     Row Name 03/12/18 0848       Calculation Measurements    Weight Used For Calculations 143 kg (315 lb 4.1 oz)       Estimated/Assessed Needs    Additional Documentation Calorie Requirements (Group);Additional Requirements (Group);Protein Requirements (Group)       Calorie Requirements    Estimated Calorie Requirement (kcal/day) 2145   15kcal/kg    Estimated Calorie Need Method kcal/kg       KCAL/KG    14 Kcal/Kg (kcal) 2002    15 Kcal/Kg (kcal) 2145    18 Kcal/Kg (kcal) 2574    20 Kcal/Kg (kcal) 2860    25 Kcal/Kg (kcal) 3575    30 Kcal/Kg (kcal) 4290    35 Kcal/Kg (kcal) 5005    40 Kcal/Kg (kcal) 5720    45 Kcal/Kg (kcal) 6435    50 Kcal/Kg (kcal) 7150       Enochs-St. Jeor Equation    RMR (Enochs-St. Jeor Equation) 2434.38       Protein Requirements    Est Protein Requirement Amount (gms/kg) 0.8 gm protein    Estimated Protein Requirements (gms/day) 114.4       Fluid Requirements    Yumiko-Chastity Method (over 20 kg) 4360    Row Name 03/12/18 0817       Nutrition Prescription PO    Current PO Diet NPO       Propofol Considerations    Propofol (mL/hr) 16.9 mL/hr    Propofol (Kcal/day) 446 Kcal/day    Row Name 03/12/18 0811       Physical Findings    Overall Physical Appearance obese;on ventilator support    Skin --   intact    Row Name 03/12/18 0810       Labs/Procedures/Meds    Lab Results Reviewed reviewed    Row Name 03/12/18 0809       Anthropometrics    Height 200.7 cm (79.02\")    Weight (!)  143 kg (314 lb 6 oz)       Ideal Body Weight (IBW)    Ideal Body Weight (IBW) (kg) 101.67    % Ideal Body Weight 140.26       Body Mass Index (BMI)    BMI " (kg/m2) 35.48    BMI Assessment BMI 35-39.9: obesity grade II       IBW Adjustment, Para/Tetraplegia    5% Adjustment, Para (IBW) 96.59    10% Adjustment, Para (IBW) 91.5    10% Adjustment, Tetra (IBW) 91.5    15% Adjustment, Tetra (IBW) 86.42    Row Name 03/12/18 0807       Reason for Assessment    Reason For Assessment other (see comments)   rounds    Diagnosis cardiac disease;diabetes diagnosis/complications;substance use/abuse;pulmonary disease   Cardiac arrest with ventricular fibrillation, hx of DM,drug use, on vent    Row Name 03/12/18 0341       Anthropometrics    Weight (!)  143 kg (314 lb 6 oz)          Problem/Interventions:        Problem 1     Row Name 03/12/18 0824       Nutrition Diagnoses Problem 1    Problem 1 Other (comment)   npo    Etiology (related to) MNT for Treatment/Condition    Cardiac --   cardiac arrest with ventricular fibrillation    Pulmonary/Critical Care Acute respiratory failure;Ventilator                    Intervention Goal     Row Name 03/12/18 0827       Intervention Goal    General Maintain nutrition    PO --    TF/PN Inititiate TF/PN    Weight No significant weight loss            Nutrition Intervention     Row Name 03/12/18 0827       Nutrition Intervention    RD/Tech Action Follow Tx progress;Care plan reviewd;Await begin PO            Nutrition Prescription     Row Name 03/12/18 0857       Nutrition Prescription EN    Enteral Prescription Enteral begin/change;Enteral to supply    Product Vital AF 1.2 chaitanya    TF Delivery Method Continuous    Continuous TF Goal Rate (mL/hr) 60 mL/hr    Water flush (mL)  30 mL    Water Flush Frequency Every 4 hours       EN to Supply    Kcal/Day 1728 Kcal/Day    Kcal/day with Propofol  2174 Kcal/day with Propofol    Protein (gm/day) 108 gm/day    Meet Estimated Kcal Need (%) 100 %    Meet Estimated Protein Need (%) 94 %    TF Free H2O (mL) 1167 mL    Total Free H2O (mL/day) 1347 mL/day            Education/Evaluation     Row Name 03/12/18 0892        Education    Education Education not appropriate at this time    Please explain Patient intubated       Monitor/Evaluation    Monitor Per protocol        Electronically signed by:  Christine Tobar RD  03/12/18 9:01 AM

## 2018-03-12 NOTE — CONSULTS
Referring Provider: Linda bowers MD  Reason for Consultation: Evaluation of patient with renal dysfunction and hyperkalemia    Subjective     Chief complaint   Chief Complaint   Patient presents with   • Cardiac Arrest       History of present illness:    This is a 52-year-old  male was dropped off the emergency department via private vehicle he had the cardiac arrest and he had the V. fib was resuscitated is currently on the ventilator but she is awake and responsive.  Patient is diabetic and has history of hypertension.  He was found to have multiple simple drug in his system including cocaine and THC and opioids.  On admission creatinine was 1.93, potassium was 4.3 and his glucose was 552.  That time his pH was 6.94 and PCO2 68.5 and PO2 67.4.  Early this morning his pH was 7.26 and his PCO2 was 39.1 and PO2 82.6.  Also glucose was 544, sodium 132, potassium 7, as total CO2 was 17.4 and chloride 96.  Creatinine up to 2.25 BUN 32.  His calcium was 7.7 his ALT and AST were elevated at normal bilirubin and his albumin was 3.9.  His lactate was 3.8 INR 1.24.  Hemoglobin 16.7 and his WBCs 27.27 and he has normal platelet.  I don't have baseline creatinine on him.  The patient is currently on the ventilator he had significant diarrhea, he denies any chest pain, he is less short of breath.  No abdominal pain.  He admits of being diabetic.      Past Medical History:   Diagnosis Date   • Diabetes mellitus    • Hypertension    • Pneumonia      History reviewed. No pertinent surgical history.  History reviewed. No pertinent family history.  Denies family history of kidney disease  Social History   Substance Use Topics   • Smoking status: Not on file   • Smokeless tobacco: Not on file   • Alcohol use Not on file     No prescriptions prior to admission.     Allergies:  Review of patient's allergies indicates no known allergies.    Review of Systems  Review of system is limited since the patient is on the  "ventilator but he appears coherent so his 14 point review of system essentially was negative other than what is noted above in the history of present illness.  And he has a Villarreal catheter anchored in place    Objective     Vital Signs  Temp:  [96.7 °F (35.9 °C)] 96.7 °F (35.9 °C)  Heart Rate:  [0-140] 112  Resp:  [24-35] 27  BP: ()/() 107/78  FiO2 (%):  [40 %-100 %] 40 %    Flowsheet Rows    Flowsheet Row First Filed Value   Admission Height 200.7 cm (79\") Documented at 03/11/2018 2344   Admission Weight (!)  141 kg (310 lb) ['s license] Documented at 03/11/2018 2344           No intake/output data recorded.  I/O last 3 completed shifts:  In: -   Out: 150 [Urine:150]    Intake/Output Summary (Last 24 hours) at 03/12/18 0914  Last data filed at 03/12/18 0534   Gross per 24 hour   Intake                0 ml   Output              150 ml   Net             -150 ml       Physical Exam:  General Appearance: alert, on the ventilator, following command he is given the sedation also, no acute distress,   Skin: warm and dry  HEENT: Orally intubated, nonicteric sclerae.   Neck: supple, no JVD, trachea midline  Lungs: Lateral rhonchi, unlabored breathing effort  Heart: RRR, normal S1 and S2, no S3, no rub  Abdomen: soft, non-tender,  present bowel sounds to auscultation  : no palpable bladder, has Villarreal catheter anchored in place  Joints: No significant deformities noted, no crepitation over the knees or the ankles.  Lymphatics: No cervical or supraclavicular lymphadenopathy  Extremities: no edema, cyanosis or clubbing  Neuro: Awake, following an simple commands, moving all extremities.    Results Review:  Results for orders placed or performed during the hospital encounter of 03/11/18   Comprehensive Metabolic Panel   Result Value Ref Range    Glucose 552 (C) 65 - 99 mg/dL    BUN 23 (H) 6 - 20 mg/dL    Creatinine 1.93 (H) 0.76 - 1.27 mg/dL    Sodium 137 136 - 145 mmol/L    Potassium 4.3 3.5 - 5.2 mmol/L    " Chloride 94 (L) 98 - 107 mmol/L    CO2 16.7 (L) 22.0 - 29.0 mmol/L    Calcium 8.5 (L) 8.6 - 10.5 mg/dL    Total Protein 6.5 6.0 - 8.5 g/dL    Albumin 3.70 3.50 - 5.20 g/dL    ALT (SGPT) 130 (H) 1 - 41 U/L    AST (SGOT) 152 (H) 1 - 40 U/L    Alkaline Phosphatase 99 39 - 117 U/L    Total Bilirubin 0.6 0.1 - 1.2 mg/dL    eGFR Non African Amer 37 (L) >60 mL/min/1.73    Globulin 2.8 gm/dL    A/G Ratio 1.3 g/dL    BUN/Creatinine Ratio 11.9 7.0 - 25.0    Anion Gap 26.3 mmol/L   Protime-INR   Result Value Ref Range    Protime 16.0 (H) 11.7 - 14.2 Seconds    INR 1.30 (H) 0.90 - 1.10   aPTT   Result Value Ref Range    PTT 35.4 22.7 - 35.4 seconds   Troponin   Result Value Ref Range    Troponin T <0.010 0.000 - 0.030 ng/mL   BNP   Result Value Ref Range    proBNP 1,533.0 (H) 0.0 - 900.0 pg/mL   CBC Auto Differential   Result Value Ref Range    WBC 16.06 (H) 4.50 - 10.70 10*3/mm3    RBC 4.71 4.60 - 6.00 10*6/mm3    Hemoglobin 15.6 13.7 - 17.6 g/dL    Hematocrit 49.3 40.4 - 52.2 %    .7 (H) 79.8 - 96.2 fL    MCH 33.1 (H) 27.0 - 32.7 pg    MCHC 31.6 (L) 32.6 - 36.4 g/dL    RDW 13.0 11.5 - 14.5 %    RDW-SD 49.7 37.0 - 54.0 fl    MPV 11.3 6.0 - 12.0 fL    Platelets 256 140 - 500 10*3/mm3    Neutrophil % 60.2 42.7 - 76.0 %    Lymphocyte % 32.4 19.6 - 45.3 %    Monocyte % 3.9 (L) 5.0 - 12.0 %    Eosinophil % 1.2 0.3 - 6.2 %    Basophil % 0.4 0.0 - 1.5 %    Immature Grans % 1.9 (H) 0.0 - 0.5 %    Neutrophils, Absolute 9.65 (H) 1.90 - 8.10 10*3/mm3    Lymphocytes, Absolute 5.21 (H) 0.90 - 4.80 10*3/mm3    Monocytes, Absolute 0.63 0.20 - 1.20 10*3/mm3    Eosinophils, Absolute 0.20 0.00 - 0.70 10*3/mm3    Basophils, Absolute 0.07 0.00 - 0.20 10*3/mm3    Immature Grans, Absolute 0.30 (H) 0.00 - 0.03 10*3/mm3   Magnesium   Result Value Ref Range    Magnesium 2.9 (H) 1.6 - 2.6 mg/dL   Phosphorus   Result Value Ref Range    Phosphorus 9.0 (H) 2.5 - 4.5 mg/dL   Calcium, Ionized   Result Value Ref Range    Ionized Calcium 1.17 1.15 -  1.35 mmol/L    Ionized Calcium 4.7 4.6 - 5.4 mg/dL   Lactic Acid, Plasma   Result Value Ref Range    Lactate 9.4 (C) 0.5 - 2.0 mmol/L   aPTT   Result Value Ref Range    PTT 35.6 (H) 22.7 - 35.4 seconds   Protime-INR   Result Value Ref Range    Protime 15.9 (H) 11.7 - 14.2 Seconds    INR 1.29 (H) 0.90 - 1.10   CBC Auto Differential   Result Value Ref Range    WBC 17.04 (H) 4.50 - 10.70 10*3/mm3    RBC 4.62 4.60 - 6.00 10*6/mm3    Hemoglobin 15.3 13.7 - 17.6 g/dL    Hematocrit 48.4 40.4 - 52.2 %    .8 (H) 79.8 - 96.2 fL    MCH 33.1 (H) 27.0 - 32.7 pg    MCHC 31.6 (L) 32.6 - 36.4 g/dL    RDW 13.2 11.5 - 14.5 %    RDW-SD 50.0 37.0 - 54.0 fl    MPV 11.4 6.0 - 12.0 fL    Platelets 265 140 - 500 10*3/mm3    Neutrophil % 58.0 42.7 - 76.0 %    Lymphocyte % 34.3 19.6 - 45.3 %    Monocyte % 3.9 (L) 5.0 - 12.0 %    Eosinophil % 1.4 0.3 - 6.2 %    Basophil % 0.5 0.0 - 1.5 %    Immature Grans % 1.9 (H) 0.0 - 0.5 %    Neutrophils, Absolute 9.88 (H) 1.90 - 8.10 10*3/mm3    Lymphocytes, Absolute 5.85 (H) 0.90 - 4.80 10*3/mm3    Monocytes, Absolute 0.66 0.20 - 1.20 10*3/mm3    Eosinophils, Absolute 0.24 0.00 - 0.70 10*3/mm3    Basophils, Absolute 0.09 0.00 - 0.20 10*3/mm3    Immature Grans, Absolute 0.32 (H) 0.00 - 0.03 10*3/mm3   Ethanol   Result Value Ref Range    Ethanol <10 0 - 10 mg/dL    Ethanol % <0.010 %   Urine Drug Screen - Urine, Clean Catch   Result Value Ref Range    Amphet/Methamphet, Screen Negative Negative    Barbiturates Screen, Urine Negative Negative    Benzodiazepine Screen, Urine Negative Negative    Cocaine Screen, Urine Positive (A) Negative    Opiate Screen Positive (A) Negative    THC, Screen, Urine Positive (A) Negative    Methadone Screen, Urine Negative Negative    Oxycodone Screen, Urine Negative Negative   Blood Gas, Arterial   Result Value Ref Range    Site Arterial: left radial     Mario's Test Positive     pH, Arterial 6.942 (C) 7.350 - 7.450 pH units    pCO2, Arterial 68.5 (C) 35.0 - 45.0 mm  Hg    pO2, Arterial 67.4 (L) 80.0 - 100.0 mm Hg    HCO3, Arterial 14.8 (L) 22.0 - 28.0 mmol/L    Base Excess, Arterial -18.8 (L) 0.0 - 2.0 mmol/L    O2 Saturation Calculated 77.3 (L) 92.0 - 99.0 %    A-a Gradiant 0.1 mmHg    Barometric Pressure for Blood Gas 750.7 mmHg    Modality Adult Vent     FIO2 100 %    Ventilator Mode VC     Set Tidal Volume 500     Set Mech Resp Rate 18     Rate 35 Breaths/minute    PEEP 5    Lactic Acid, Reflex Timer (This will reflex a repeat order 3-3:15 hours after ordered.)   Result Value Ref Range    Extra Tube Hold for add-ons.    Comprehensive Metabolic Panel   Result Value Ref Range    Glucose 544 (C) 65 - 99 mg/dL    BUN 32 (H) 6 - 20 mg/dL    Creatinine 2.25 (H) 0.76 - 1.27 mg/dL    Sodium 132 (L) 136 - 145 mmol/L    Potassium 7.0 (C) 3.5 - 5.2 mmol/L    Chloride 96 (L) 98 - 107 mmol/L    CO2 17.4 (L) 22.0 - 29.0 mmol/L    Calcium 7.7 (L) 8.6 - 10.5 mg/dL    Total Protein 6.4 6.0 - 8.5 g/dL    Albumin 3.90 3.50 - 5.20 g/dL    ALT (SGPT) 140 (H) 1 - 41 U/L    AST (SGOT) 180 (H) 1 - 40 U/L    Alkaline Phosphatase 96 39 - 117 U/L    Total Bilirubin 1.0 0.1 - 1.2 mg/dL    eGFR Non African Amer 31 (L) >60 mL/min/1.73    Globulin 2.5 gm/dL    A/G Ratio 1.6 g/dL    BUN/Creatinine Ratio 14.2 7.0 - 25.0    Anion Gap 18.6 mmol/L   CBC (No Diff)   Result Value Ref Range    WBC 27.27 (H) 4.50 - 10.70 10*3/mm3    RBC 4.96 4.60 - 6.00 10*6/mm3    Hemoglobin 16.7 13.7 - 17.6 g/dL    Hematocrit 50.2 40.4 - 52.2 %    .2 (H) 79.8 - 96.2 fL    MCH 33.7 (H) 27.0 - 32.7 pg    MCHC 33.3 32.6 - 36.4 g/dL    RDW 13.0 11.5 - 14.5 %    RDW-SD 48.3 37.0 - 54.0 fl    MPV 11.2 6.0 - 12.0 fL    Platelets 294 140 - 500 10*3/mm3   BNP   Result Value Ref Range    proBNP 1,721.0 (H) 5.0 - 900.0 pg/mL   Protime-INR   Result Value Ref Range    Protime 15.4 (H) 11.7 - 14.2 Seconds    INR 1.24 (H) 0.90 - 1.10   Lactic Acid, Reflex   Result Value Ref Range    Lactate 3.8 (C) 0.5 - 2.0 mmol/L   Blood Gas,  Arterial   Result Value Ref Range    Site Arterial: left radial     Mario's Test Positive     pH, Arterial 7.267 (C) 7.350 - 7.450 pH units    pCO2, Arterial 39.1 35.0 - 45.0 mm Hg    pO2, Arterial 82.6 80.0 - 100.0 mm Hg    HCO3, Arterial 17.8 (L) 22.0 - 28.0 mmol/L    Base Excess, Arterial -8.6 (L) 0.0 - 2.0 mmol/L    O2 Saturation Calculated 94.4 92.0 - 99.0 %    A-a Gradiant 0.3 mmHg    Barometric Pressure for Blood Gas 748.3 mmHg    Modality Adult Vent     FIO2 40 %    Ventilator Mode PC     Set Tidal Volume 684     Set Mech Resp Rate 10     Rate 34 Breaths/minute    PEEP 5    POC Glucose Once   Result Value Ref Range    Glucose 437 (H) 70 - 130 mg/dL   POC Glucose Once   Result Value Ref Range    Glucose 478 (C) 70 - 130 mg/dL   POC Glucose Once   Result Value Ref Range    Glucose 506 (C) 70 - 130 mg/dL     Imaging Results (last 72 hours)     Procedure Component Value Units Date/Time    XR Abdomen KUB [[id number]] Collected:  03/12/18 0423     Updated:  03/12/18 0427    Narrative:       SINGLE VIEW ABDOMEN/KUB     HISTORY:OG Placement; I46.9-Cardiac arrest, cause unspecified;  I49.01-Ventricular fibrillation; I48.91-Unspecified atrial fibrillation;  R41.82-Altered mental status, unspecified     COMPARISON: 3:01 AM.     FINDINGS: 2 portable views of the upper abdomen obtained. Nasogastric  tube is now coiled beneath the left hemidiaphragm at the level of the  proximal stomach.             Impression:       NG tube as above      This report was finalized on 3/12/2018 4:24 AM by Roberto Carlos Yates MD.       XR Abdomen KUB [[id number]] Collected:  03/12/18 0305     Updated:  03/12/18 0310    Narrative:       SINGLE VIEW ABDOMEN/KUB     HISTORY:OG verification; I46.9-Cardiac arrest, cause unspecified;  I49.01-Ventricular fibrillation; I48.91-Unspecified atrial fibrillation;  R41.82-Altered mental status, unspecified     COMPARISON: None.     FINDINGS: 3 portable views of the abdomen obtained. Tissue penetration  limited  by body habitus. On one of the images, the distal portion of the  nasogastric tube is faintly seen above the diaphragm at the level of the  distal esophagus. Suggest tube advancement 15-20 cm.             Impression:       Nasogastric tube as above      This report was finalized on 3/12/2018 3:07 AM by Roberto Carlos Yates MD.       CT Head Without Contrast [[id number]] Collected:  03/12/18 0116     Updated:  03/12/18 0120    Narrative:       CRANIAL CT SCAN WITHOUT CONTRAST     CLINICAL HISTORY: Confusion/delirium, altered LOC, unexplained;  I46.9-Cardiac arrest, cause unspecified; I49.01-Ventricular  fibrillation; I48.91-Unspecified atrial fibrillation; R41.82-Altered  mental status, unspecified     COMPARISON: None.     TECHNIQUE: Radiation dose reduction techniques were utilized, including  automated exposure control and exposure modulation based on body size.  Multiple axial images of the head were obtained without contrast.      FINDINGS:  There are no abnormal areas of increased density or mass  effect.      Ventricles, sulci, and cisterns appear normal. Bone window  images are unremarkable.                Impression:       1. No acute intracranial abnormality.                     This study was performed with techniques to keep radiation doses as low  as reasonably achievable (ALARA). Individualized dose reduction  techniques using automated exposure control or adjustment of mA and/or  kV according to the patient size were employed.      This report was finalized on 3/12/2018 1:17 AM by Roberto Carlos Yates MD.       XR Chest 1 View [[id number]] Collected:  03/12/18 0020     Updated:  03/12/18 0024    Narrative:       PORTABLE CHEST X-RAY     CLINICAL HISTORY: v fib arrest; I46.9-Cardiac arrest, cause unspecified;  I49.01-Ventricular fibrillation; I21.3-ST elevation (STEMI) myocardial  infarction of unspecified site; I48.91-Unspecified atrial fibrillation;  R41.82-Altered mental status, unspecified     COMPARISON: None.      FINDINGS: Portable AP view of the chest was obtained with overlying  monitor leads in place. ET tube projects at the level of the mid  thoracic trachea. Hazy bilateral opacities likely related to pulmonary  edema. There is a small right pleural effusion. Heart is enlarged.             Impression:       Patient intubated, findings of mild CHF with right effusion  noted        This report was finalized on 3/12/2018 12:21 AM by Roberto Carlos Yates MD.                 calcium gluconate 1 g Intravenous Once       diltiaZEM 5-15 mg/hr Last Rate: Stopped (03/12/18 0600)   insulin regular infusion 1 unit/mL (CCU use) 0-50 Units/hr    propofol 5-50 mcg/kg/min Last Rate: 40 mcg/kg/min (03/12/18 6549)   sodium bicarbonate drip (greater than 75 mEq/bag) 50 mL/hr    sodium chloride  Last Rate: 1,000 mL/hr (03/11/18 6964)   sodium chloride 100 mL/hr Last Rate: 100 mL/hr (03/12/18 0200)       Assessment/Plan   1. Acute kidney injury most likely associated with his cardiac arrest.  No baseline renal function available at this time.  2.  Severe hyperkalemia associated with metabolic acidosis and severe hyperglycemia, her pH was 7.26 and the glucose was 554.  3.  Pseudo-hyponatremia associated with the hyperglycemia sodium is 132 with glucose 554  4.  Status post V. fib arrest  5.  History of diabetes mellitus type 2  6.  Poly-substance abuse  7.  Leukocytosis      Plan:  1.  Check random urine for sodium, chloride, protein to creatinine ratio.    2.  I agree with the treatment given to lower his potassium.    3.  We'll check a stat to the lab now and adjust the treatment as needed   4.  Surveillance labs     Thank you Dr. Blank for asking me to see this patient in consultation    I discussed the patients findings and my recommendations with the nursing staff    Trent Arias MD  03/12/18  9:14 AM

## 2018-03-12 NOTE — PLAN OF CARE
Problem: Patient Care Overview  Goal: Plan of Care Review   03/12/18 0812   OTHER   Outcome Summary Pt arrived to unit at 1am. Sedated and on vent. No signs of pain. Multiple large bowel movements overnight. Low UOP. BG treated this morn. Still remains in controlled afib

## 2018-03-12 NOTE — NURSING NOTE
0417 Spoke with JESUSITA Caldwell for Dr. Lake informed of inability to obtain records as none were available. Also informed of excessive liquid, extremely foul smelling diarrhea, and inability to use a BMS. See APRn orders. Read back and verified.    Zamzam Terry RN

## 2018-03-12 NOTE — PROGRESS NOTES
"      [facility]         Dr Mckeon Sayied   LOS: 0 days   Patient Care Team:  No Known Provider as PCP - General    Chief Complaint: Resuscitated V. fib arrest in the setting of positive drug screen.  Persistent lactic acidosis and acute kidney injury and hyperkalemia.    Interval History: Events noted chart reviewed.  I was called in to evaluate his persistent hyperkalemia.  Currently patient on the vent and I tried spontaneous breathing trial and he was tachypneic.  We are going to proceed with temporizing measures for hyperkalemia.  He wakes up and follows simple commands.  Nurse reports blood-tinged stool.    REVIEW OF SYSTEMS:   Sedated intubated.    Ventilator/Non-Invasive Ventilation Settings     Start     Ordered    03/12/18 0437  Ventilator - AC/PC; 10; 40; 5; 15  Continuous     Question Answer Comment   Vent Mode AC/PC    Breath rate 10    FiO2 40    PEEP 5    Inspiratory Pressure 15        03/12/18 0437      Failed spontaneous breathing trial today.      Vital Signs  Temp:  [96.7 °F (35.9 °C)] 96.7 °F (35.9 °C)  Heart Rate:  [0-140] 112  Resp:  [24-35] 27  BP: ()/() 107/78  FiO2 (%):  [40 %-100 %] 40 %    Intake/Output Summary (Last 24 hours) at 03/12/18 0932  Last data filed at 03/12/18 0534   Gross per 24 hour   Intake                0 ml   Output              150 ml   Net             -150 ml     Flowsheet Rows    Flowsheet Row First Filed Value   Admission Height 200.7 cm (79\") Documented at 03/11/2018 2344   Admission Weight (!)  141 kg (310 lb) ['s license] Documented at 03/11/2018 2344          Physical Exam:   General Appearance:    Sedated sedated intubated.  Following simple commands.  ET tube good position orally.  Not a lot of purulent secretions reported.     Lungs:     Equal breath sounds with rhonchi and crackles on the bases.      Heart:    Regular rhythm and normal rate, normal S1 and S2, no            murmur, no gallop, no rub, no click   Chest Wall:    No " abnormalities observed   Abdomen:    Soft mildly distended.  Diffuse tenderness no rebound or guarding.     Extremities:   Moves all extremities well, 1+ edema, no cyanosis, no             redness     Results Review:          Results from last 7 days  Lab Units 03/12/18  0527 03/12/18  0019   SODIUM mmol/L 132* 137   POTASSIUM mmol/L 7.0* 4.3   CHLORIDE mmol/L 96* 94*   CO2 mmol/L 17.4* 16.7*   BUN mg/dL 32* 23*   CREATININE mg/dL 2.25* 1.93*   GLUCOSE mg/dL 544* 552*   CALCIUM mg/dL 7.7* 8.5*       Results from last 7 days  Lab Units 03/12/18  0019   TROPONIN T ng/mL <0.010       Results from last 7 days  Lab Units 03/12/18  0413 03/12/18  0019   WBC 10*3/mm3 27.27* 17.04*  16.06*   HEMOGLOBIN g/dL 16.7 15.3  15.6   HEMATOCRIT % 50.2 48.4  49.3   PLATELETS 10*3/mm3 294 265  256       Results from last 7 days  Lab Units 03/12/18  0413 03/12/18  0019   INR  1.24* 1.29*  1.30*   APTT seconds  --  35.6*  35.4           Results from last 7 days  Lab Units 03/12/18  0019   MAGNESIUM mg/dL 2.9*           Results from last 7 days  Lab Units 03/12/18  0358   PH, ARTERIAL pH units 7.267*   PO2 ART mm Hg 82.6   PCO2, ARTERIAL mm Hg 39.1   HCO3 ART mmol/L 17.8*       I reviewed the patient's new clinical results.  I personally viewed and interpreted the patient's CXR        Medication Review:     calcium gluconate 1 g Intravenous Once         diltiaZEM 5-15 mg/hr Last Rate: Stopped (03/12/18 0600)   insulin regular infusion 1 unit/mL (CCU use) 0-50 Units/hr    propofol 5-50 mcg/kg/min Last Rate: 40 mcg/kg/min (03/12/18 2626)   sodium bicarbonate drip (greater than 75 mEq/bag) 50 mL/hr    sodium chloride  Last Rate: 1,000 mL/hr (03/11/18 1788)   sodium chloride 100 mL/hr Last Rate: 100 mL/hr (03/12/18 0200)       ASSESSMENT:   Resuscitated V. fib arrest  Acute respiratory failure, mechanical ventilation  Atrial fibrillation presumed new onset  Positive UDS  Acute kidney injury with severe hyperkalemia  Severe lactic  acidosis  Possible GI bleed    PLAN:  Continue supportive care  Rising lactic acidosis.  IV fluids per renal  Failed spontaneous breathing trial today  Will check pro calcitonin.  Not sure if this is all CHF versus some aspiration.  I will start him on some Zosyn also.  Appreciate input from GI.  Recommendations noted watch hemoglobin to 12 per GI  Trend lactate  No family at bedside  Continue current care      Linda Blank MD  03/12/18  9:32 AM      Time: Critical care 35 min

## 2018-03-12 NOTE — CONSULTS
[facility]  Initial Inpatient Consult Note    Referring Provider: Dr Blank    Reason for Consultation: diarrhea, possible GIB    Subjective     History of present illness:    52 y.o. male recta the emergency room due to confusion.  Apparently became unresponsive in the car outside of the emergency room and was found to be in V. fib and full cardiac arrest.  He was shocked 3 times and intubated.  He has had continued intervals of V. tach.  He has been evaluated by cardiology.  He has a history of cocaine abuse.  We were consulted for profuse watery diarrhea with a pink tinge.  It is not grossly bloody.  He denies abdominal pain.  He reports having a prior colonoscopy at [facility].  He denies having any GI bleeding in the past.  He can give no other history as he is currently intubated.    Past Medical History:  Past Medical History:   Diagnosis Date   • Diabetes mellitus    • Hypertension    • Pneumonia      Past Surgical History:  History reviewed. No pertinent surgical history.   Social History:   Social History   Substance Use Topics   • Smoking status: Not on file   • Smokeless tobacco: Not on file   • Alcohol use Not on file      Family History:  History reviewed. No pertinent family history.    Home Meds:  No prescriptions prior to admission.     Current Meds:     calcium gluconate 1 g Intravenous Once     Allergies:  No Known Allergies  Review of Systems  Review of systems could not be obtained due to   patient intubated.     Objective     Vital Signs  Temp:  [96.7 °F (35.9 °C)] 96.7 °F (35.9 °C)  Heart Rate:  [0-140] 112  Resp:  [24-35] 27  BP: ()/() 107/78  FiO2 (%):  [40 %-100 %] 40 %  Physical Exam:  General Appearance:    Alert, follows commands, on vent   Head:    Normocephalic, without obvious abnormality, atraumatic   Eyes:            Lids and lashes normal, conjunctivae and sclerae normal, no   icterus   Throat:   oral mucosa moist, intubated        Lungs:     Clear to auscultation,respirations regular, even and                   unlabored    Heart:    Regular rhythm and normal rate, normal S1 and S2, no            Murmur    Chest Wall:    No abnormalities observed   Abdomen:     Normal bowel sounds, no masses, no organomegaly, soft        non-tender, non-distended, no guarding, no rebound                 tenderness   Rectal:     Deferred   Extremities:   no edema, no cyanosis, no redness   Skin:   No bleeding, bruising or rash       Psychiatric:  unable to assess,intubated   Results Review:   I reviewed the patient's new clinical results.      Results from last 7 days  Lab Units 03/12/18  0413 03/12/18  0019   WBC 10*3/mm3 27.27* 17.04*  16.06*   HEMOGLOBIN g/dL 16.7 15.3  15.6   HEMATOCRIT % 50.2 48.4  49.3   PLATELETS 10*3/mm3 294 265  256       Results from last 7 days  Lab Units 03/12/18  0527 03/12/18  0019   SODIUM mmol/L 132* 137   POTASSIUM mmol/L 7.0* 4.3   CHLORIDE mmol/L 96* 94*   CO2 mmol/L 17.4* 16.7*   BUN mg/dL 32* 23*   CREATININE mg/dL 2.25* 1.93*   CALCIUM mg/dL 7.7* 8.5*   BILIRUBIN mg/dL 1.0 0.6   ALK PHOS U/L 96 99   ALT (SGPT) U/L 140* 130*   AST (SGOT) U/L 180* 152*   GLUCOSE mg/dL 544* 552*       Results from last 7 days  Lab Units 03/12/18  0413 03/12/18  0019   INR  1.24* 1.29*  1.30*     No results found for: LIPASE    Radiology:  XR Abdomen KUB   Final Result   NG tube as above        This report was finalized on 3/12/2018 4:24 AM by Roberto Carlos Yates MD.          XR Abdomen KUB   Final Result   Nasogastric tube as above        This report was finalized on 3/12/2018 3:07 AM by Roberto Carlos Yates MD.          CT Head Without Contrast   Final Result   1. No acute intracranial abnormality.                           This study was performed with techniques to keep radiation doses as low   as reasonably achievable (ALARA). Individualized dose reduction   techniques using automated exposure control or adjustment of mA and/or   kV  according to the patient size were employed.        This report was finalized on 3/12/2018 1:17 AM by Roberto Carlos Yates MD.          XR Chest 1 View   Final Result   Patient intubated, findings of mild CHF with right effusion   noted           This report was finalized on 3/12/2018 12:21 AM by Roberto Carlos Yates MD.              Assessment/Plan   Patient Active Problem List   Diagnosis   • Cardiac arrest with ventricular fibrillation     Impression-  1.  Diarrhea, blood-tinged  2.  Status post V. fib arrest  3.  History of cocaine abuse  4.  Acute kidney injury  5.  Lactic acidosis  6.  Elevated LFTs    Plan-  - Request colonoscopy report from [facility]  - Continue close observation for now-obviously he is not a candidate for any invasive procedures at this point given his cardiac instability.  His hemoglobin is normal near its no gross bleeding.  Certainly could have some aspect of colonic ischemia related to his arrest.  Continue close observation.  - Elevated LFTs likely related to shock and hypotension-given history of polysubstance abuse will check acute hepatitis panel    I discussed the patients findings and my recommendations with patient, nursing staff and consulting provider.    Madelyn Lake MD

## 2018-03-12 NOTE — ED NOTES
EMERGENCY DEPARTMENT ENCOUNTER    CHIEF COMPLAINT  Chief Complaint: Cardiac Arrest  History given by: triage RN  History limited by: Acuity of condition  Room Number: 335/1  PMD: No Known Provider      HPI:  Pt is a 52 y.o. male who presents in cardiac arrest. Pt was dropped off in the ED via private vehicle and arrived unresponsive. Only history provided is that the pt is a diabetic. The individual who brought the pt to the ED left the facility without providing any additional hx.     Location of Arrest - Other - unknown   Witnessed Arrest - unknown  Bystander CPR - No  Time of Collapse - unknown  Time CPR Initiated - 23:22  Initial Cardiac Rhythm - Asystole  ROSC in Field - No  Time of Arrival to [facility] ED - 2310  STEMI - Yes  Treatment Prior to Arrival - none      PAST MEDICAL HISTORY  Active Ambulatory Problems     Diagnosis Date Noted   • No Active Ambulatory Problems     Resolved Ambulatory Problems     Diagnosis Date Noted   • No Resolved Ambulatory Problems     Past Medical History:   Diagnosis Date   • Diabetes mellitus    • Hypertension    • Pneumonia        PAST SURGICAL HISTORY  History reviewed. No pertinent surgical history.    FAMILY HISTORY  History reviewed. No pertinent family history.    SOCIAL HISTORY  Social History     Social History   • Marital status: Single     Spouse name: N/A   • Number of children: N/A   • Years of education: N/A     Occupational History   • Not on file.     Social History Main Topics   • Smoking status: Not on file   • Smokeless tobacco: Not on file   • Alcohol use Not on file   • Drug use:      Frequency: 3.0 times per week     Types: Cocaine      Comment: used today   • Sexual activity: Not on file     Other Topics Concern   • Not on file     Social History Narrative   • No narrative on file       ALLERGIES  Review of patient's allergies indicates no known allergies.    REVIEW OF SYSTEMS  Review of Systems   Unable to perform ROS: Acuity of condition       PHYSICAL  EXAM  ED Triage Vitals   Temp Heart Rate Resp BP SpO2   03/11/18 2349 03/11/18 2330 03/11/18 2349 03/11/18 2340 03/11/18 2334   96.7 °F (35.9 °C) (!) 0 (!) 35 91/81 (!) 86 %      Temp src Heart Rate Source Patient Position BP Location FiO2 (%)   03/11/18 2349 -- -- -- --   Tympanic           Physical Exam   Constitutional: He appears distressed.   HENT:   Head: Normocephalic and atraumatic.   Eyes: Pupils are equal, round, and reactive to light.   4mm and reactive   Neck: No tracheal deviation present. No thyromegaly present.   Cardiovascular:   Pt in ventricular fib without palpable pulse.   Pulmonary/Chest:   No spontaneous respirations   Abdominal: Soft. He exhibits no distension and no mass.   Musculoskeletal: He exhibits no edema or deformity.   Neurological:   Unresponsive   Skin: Skin is warm and dry. No pallor.   Psychiatric:   Unable to assess       LAB RESULTS  Lab Results (last 24 hours)     Procedure Component Value Units Date/Time    Blood Gas, Arterial [[id number]]  (Abnormal) Collected:  03/12/18 0004    Specimen:  Arterial Blood Updated:  03/12/18 0012     Site Arterial: left radial     Mario's Test Positive     pH, Arterial 6.942 (C) pH units      Comment: Critical:Notify Dr YEUNG (12-Mar-18 00:10:46)Read back ok        pCO2, Arterial 68.5 (C) mm Hg      Comment: Critical:Notify Dr YEUNG (12-Mar-18 00:10:46)Read back ok        pO2, Arterial 67.4 (L) mm Hg      HCO3, Arterial 14.8 (L) mmol/L      Base Excess, Arterial -18.8 (L) mmol/L      O2 Saturation Calculated 77.3 (L) %      A-a Gradiant 0.1 mmHg      Barometric Pressure for Blood Gas 750.7 mmHg      Modality Adult Vent     FIO2 100 %      Ventilator Mode VC     Set Tidal Volume 500     Set Mech Resp Rate 18     Rate 35 Breaths/minute      PEEP 5    Narrative:       SAT UNKNOWN Meter: [id number] : [id number] Marcelina Contreras    CBC & Differential [[id number]] Collected:  03/12/18 0019    Specimen:  Blood Updated:  03/12/18 0101    Narrative:        The following orders were created for panel order CBC & Differential.  Procedure                               Abnormality         Status                     ---------                               -----------         ------                     CBC Auto Differential[[id number]]        Abnormal            Final result                 Please view results for these tests on the individual orders.    Comprehensive Metabolic Panel [[id number]]  (Abnormal) Collected:  03/12/18 0019    Specimen:  Blood Updated:  03/12/18 0109     Glucose 552 (C) mg/dL      BUN 23 (H) mg/dL      Creatinine 1.93 (H) mg/dL      Sodium 137 mmol/L      Potassium 4.3 mmol/L      Chloride 94 (L) mmol/L      CO2 16.7 (L) mmol/L      Calcium 8.5 (L) mg/dL      Total Protein 6.5 g/dL      Albumin 3.70 g/dL      ALT (SGPT) 130 (H) U/L      AST (SGOT) 152 (H) U/L      Comment: Specimen hemolyzed.  Results may be affected.        Alkaline Phosphatase 99 U/L      Total Bilirubin 0.6 mg/dL      eGFR Non African Amer 37 (L) mL/min/1.73      Globulin 2.8 gm/dL      A/G Ratio 1.3 g/dL      BUN/Creatinine Ratio 11.9     Anion Gap 26.3 mmol/L     Protime-INR [[id number]]  (Abnormal) Collected:  03/12/18 0019    Specimen:  Blood Updated:  03/12/18 0118     Protime 16.0 (H) Seconds      INR 1.30 (H)    aPTT [[id number]]  (Normal) Collected:  03/12/18 0019    Specimen:  Blood Updated:  03/12/18 0118     PTT 35.4 seconds     Troponin [[id number]]  (Normal) Collected:  03/12/18 0019    Specimen:  Blood Updated:  03/12/18 0059     Troponin T <0.010 ng/mL     Narrative:       Troponin T Reference Ranges:  Less than 0.03 ng/mL:    Negative for AMI  0.03 to 0.09 ng/mL:      Indeterminant for AMI  Greater than 0.09 ng/mL: Positive for AMI    BNP [[id number]]  (Abnormal) Collected:  03/12/18 0019    Specimen:  Blood Updated:  03/12/18 0057     proBNP 1,533.0 (H) pg/mL     Narrative:       Among patients with dyspnea, NT-proBNP is highly sensitive for the detection of  acute congestive heart failure. In addition NT-proBNP of <300 pg/ml effectively rules out acute congestive heart failure with 99% negative predictive value.    CBC Auto Differential [[id number]]  (Abnormal) Collected:  03/12/18 0019    Specimen:  Blood Updated:  03/12/18 0101     WBC 16.06 (H) 10*3/mm3      RBC 4.71 10*6/mm3      Hemoglobin 15.6 g/dL      Hematocrit 49.3 %      .7 (H) fL      MCH 33.1 (H) pg      MCHC 31.6 (L) g/dL      RDW 13.0 %      RDW-SD 49.7 fl      MPV 11.3 fL      Platelets 256 10*3/mm3      Neutrophil % 60.2 %      Lymphocyte % 32.4 %      Monocyte % 3.9 (L) %      Eosinophil % 1.2 %      Basophil % 0.4 %      Immature Grans % 1.9 (H) %      Neutrophils, Absolute 9.65 (H) 10*3/mm3      Lymphocytes, Absolute 5.21 (H) 10*3/mm3      Monocytes, Absolute 0.63 10*3/mm3      Eosinophils, Absolute 0.20 10*3/mm3      Basophils, Absolute 0.07 10*3/mm3      Immature Grans, Absolute 0.30 (H) 10*3/mm3     Magnesium [[id number]]  (Abnormal) Collected:  03/12/18 0019    Specimen:  Blood Updated:  03/12/18 0059     Magnesium 2.9 (H) mg/dL     Phosphorus [[id number]]  (Abnormal) Collected:  03/12/18 0019    Specimen:  Blood Updated:  03/12/18 0059     Phosphorus 9.0 (H) mg/dL     Calcium, Ionized [[id number]]  (Normal) Collected:  03/12/18 0019    Specimen:  Blood Updated:  03/12/18 0119     Ionized Calcium 1.17 mmol/L      Ionized Calcium 4.7 mg/dL     CBC & Differential [[id number]] Collected:  03/12/18 0019    Specimen:  Blood Updated:  03/12/18 0102    Narrative:       The following orders were created for panel order CBC & Differential.  Procedure                               Abnormality         Status                     ---------                               -----------         ------                     CBC Auto Differential[[id number]]        Abnormal            Final result                 Please view results for these tests on the individual orders.    Lactic Acid, Plasma [[id number]]   (Abnormal) Collected:  03/12/18 0019    Specimen:  Blood Updated:  03/12/18 0100     Lactate 9.4 (C) mmol/L     aPTT [[id number]]  (Abnormal) Collected:  03/12/18 0019    Specimen:  Blood Updated:  03/12/18 0118     PTT 35.6 (H) seconds     Protime-INR [[id number]]  (Abnormal) Collected:  03/12/18 0019    Specimen:  Blood Updated:  03/12/18 0118     Protime 15.9 (H) Seconds      INR 1.29 (H)    CBC Auto Differential [[id number]]  (Abnormal) Collected:  03/12/18 0019    Specimen:  Blood Updated:  03/12/18 0102     WBC 17.04 (H) 10*3/mm3      RBC 4.62 10*6/mm3      Hemoglobin 15.3 g/dL      Hematocrit 48.4 %      .8 (H) fL      MCH 33.1 (H) pg      MCHC 31.6 (L) g/dL      RDW 13.2 %      RDW-SD 50.0 fl      MPV 11.4 fL      Platelets 265 10*3/mm3      Neutrophil % 58.0 %      Lymphocyte % 34.3 %      Monocyte % 3.9 (L) %      Eosinophil % 1.4 %      Basophil % 0.5 %      Immature Grans % 1.9 (H) %      Neutrophils, Absolute 9.88 (H) 10*3/mm3      Lymphocytes, Absolute 5.85 (H) 10*3/mm3      Monocytes, Absolute 0.66 10*3/mm3      Eosinophils, Absolute 0.24 10*3/mm3      Basophils, Absolute 0.09 10*3/mm3      Immature Grans, Absolute 0.32 (H) 10*3/mm3     Ethanol [[id number]] Collected:  03/12/18 0019    Specimen:  Blood Updated:  03/12/18 0059     Ethanol <10 mg/dL      Ethanol % <0.010 %     Lactic Acid, Reflex Timer (This will reflex a repeat order 3-3:15 hours after ordered.) [[id number]] Collected:  03/12/18 0019    Specimen:  Blood Updated:  03/12/18 0401     Extra Tube Hold for add-ons.     Comment: Auto resulted.       Urine Drug Screen - Urine, Clean Catch [[id number]]  (Abnormal) Collected:  03/12/18 0031    Specimen:  Urine from Urine, Catheter Updated:  03/12/18 0119     Amphet/Methamphet, Screen Negative     Barbiturates Screen, Urine Negative     Benzodiazepine Screen, Urine Negative     Cocaine Screen, Urine Positive (A)     Opiate Screen Positive (A)     THC, Screen, Urine Positive (A)     Methadone  Screen, Urine Negative     Oxycodone Screen, Urine Negative    Narrative:       Negative Thresholds For Drugs Screened:     Amphetamines               500 ng/ml   Barbiturates               200 ng/ml   Benzodiazepines            100 ng/ml   Cocaine                    300 ng/ml   Methadone                  300 ng/ml   Opiates                    300 ng/ml   Oxycodone                  100 ng/ml   THC                        50 ng/ml    The Normal Value for all drugs tested is negative. This report includes final unconfirmed screening results to be used for medical treatment purposes only. Unconfirmed results must not be used for non-medical purposes such as employment or legal testing. Clinical consideration should be applied to any drug of abuse test, particulary when unconfirmed results are used.    POC Glucose Once [[id number]]  (Abnormal) Collected:  03/12/18 0130    Specimen:  Blood Updated:  03/12/18 0156     Glucose 437 (H) mg/dL     Narrative:       Confirmed by Repeat Meter: [id number] : [id number] Norberto Ram RN    Blood Gas, Arterial [[id number]]  (Abnormal) Collected:  03/12/18 0358    Specimen:  Arterial Blood Updated:  03/12/18 0402     Site Arterial: left radial     Mario's Test Positive     pH, Arterial 7.267 (C) pH units      Comment: Critical:Notify Dr dr barry (12-Mar-18 04:00:52)Read back ok        pCO2, Arterial 39.1 mm Hg      pO2, Arterial 82.6 mm Hg      HCO3, Arterial 17.8 (L) mmol/L      Base Excess, Arterial -8.6 (L) mmol/L      O2 Saturation Calculated 94.4 %      A-a Gradiant 0.3 mmHg      Barometric Pressure for Blood Gas 748.3 mmHg      Modality Adult Vent     FIO2 40 %      Ventilator Mode PC     Set Tidal Volume 684     Set Mech Resp Rate 10     Rate 34 Breaths/minute      PEEP 5    Narrative:       sats=95%, ip=15 Meter: [id number] : [id number] Roosevelt Spencer    CBC (No Diff) [[id number]]  (Abnormal) Collected:  03/12/18 0413    Specimen:  Blood Updated:  03/12/18 0450     WBC  27.27 (H) 10*3/mm3      RBC 4.96 10*6/mm3      Hemoglobin 16.7 g/dL      Hematocrit 50.2 %      .2 (H) fL      MCH 33.7 (H) pg      MCHC 33.3 g/dL      RDW 13.0 %      RDW-SD 48.3 fl      MPV 11.2 fL      Platelets 294 10*3/mm3     BNP [[id number]]  (Abnormal) Collected:  03/12/18 0413    Specimen:  Blood Updated:  03/12/18 0458     proBNP 1,721.0 (H) pg/mL     Narrative:       Among patients with dyspnea, NT-proBNP is highly sensitive for the detection of acute congestive heart failure. In addition NT-proBNP of <300 pg/ml effectively rules out acute congestive heart failure with 99% negative predictive value.    Protime-INR [[id number]]  (Abnormal) Collected:  03/12/18 0413    Specimen:  Blood Updated:  03/12/18 0453     Protime 15.4 (H) Seconds      INR 1.24 (H)    Lactic Acid, Reflex [[id number]]  (Abnormal) Collected:  03/12/18 0413    Specimen:  Blood Updated:  03/12/18 0510     Lactate 3.8 (C) mmol/L     Comprehensive Metabolic Panel [[id number]] Collected:  03/12/18 0527    Specimen:  Blood Updated:  03/12/18 0527          I ordered the above labs and reviewed the results    RADIOLOGY  XR Abdomen KUB   Final Result   NG tube as above        This report was finalized on 3/12/2018 4:24 AM by Roberto Carlos Yates MD.          XR Abdomen KUB   Final Result   Nasogastric tube as above        This report was finalized on 3/12/2018 3:07 AM by Roberto Carlos Yates MD.          CT Head Without Contrast   Final Result   1. No acute intracranial abnormality.                           This study was performed with techniques to keep radiation doses as low   as reasonably achievable (ALARA). Individualized dose reduction   techniques using automated exposure control or adjustment of mA and/or   kV according to the patient size were employed.        This report was finalized on 3/12/2018 1:17 AM by Roberto Carlos Yates MD.          XR Chest 1 View   Final Result   Patient intubated, findings of mild CHF with right effusion   noted            This report was finalized on 3/12/2018 12:21 AM by Roberto Carlos Yates MD.               I ordered the above noted radiological studies. Interpreted by radiologist. Reviewed by me in PACS.       PROCEDURES  IO Line Insertion  Date/Time: 3/11/2018 11:59 PM  Performed by: XAVIER ZHANG  Authorized by: XAVIER ZHANG     Consent:     Consent obtained:  Emergent situation  Pre-procedure details:     Site preparation:  Chlorhexidine    Preparation: Patient was prepped and draped in usual sterile fashion    Anesthesia (see MAR for exact dosages):     Anesthesia method:  None  Procedure details:     Insertion site:  L proximal tibia    Insertion device:  Drill device    Insertion: Needle was inserted through the bony cortex      Number of attempts:  1    Insertion confirmation:  Aspiration of blood/marrow, easy infusion of fluids and stability of the needle  Post-procedure details:     Secured with:  Transparent dressing    Patient tolerance of procedure:  Tolerated well, no immediate complications  Intubation  Date/Time: 3/12/2018 12:00 AM  Performed by: XAVIER ZHANG  Authorized by: XAVIER ZHANG     Consent:     Consent obtained:  Emergent situation  Pre-procedure details:     Patient status:  Unresponsive    Pretreatment medications:  None    Paralytics:  None  Procedure details:     Preoxygenation:  Bag valve mask    CPR in progress: yes      Intubation method:  Oral    Oral intubation technique:  Video-assisted    Laryngoscope blade:  Mac 4    Tube size (mm):  7.5    Tube type:  Cuffed    Number of attempts:  1    Tube visualized through cords: yes    Placement assessment:     ETT to lip:  27    Tube secured with:  ETT downing    Breath sounds:  Equal    Placement verification: chest rise, equal breath sounds and ETCO2 detector    Post-procedure details:     Patient tolerance of procedure:  Tolerated well, no immediate complications  Critical Care  Performed by: XAVIER ZHANG  Authorized by:  XAVIER ZHANG     Critical care provider statement:     Critical care time (minutes):  40    Critical care time was exclusive of:  Separately billable procedures and treating other patients and teaching time    Critical care was necessary to treat or prevent imminent or life-threatening deterioration of the following conditions:  Cardiac failure and respiratory failure    Critical care was time spent personally by me on the following activities:  Development of treatment plan with patient or surrogate, discussions with consultants, evaluation of patient's response to treatment, examination of patient, obtaining history from patient or surrogate, ordering and performing treatments and interventions, ordering and review of laboratory studies, ordering and review of radiographic studies, re-evaluation of patient's condition, ventilator management and vascular access procedures        EKG           EKG time: 00:12  Rhythm/Rate: Afib with   P waves and GA: absent   QRS, axis: widened QRS, LAD, LBBB   ST and T waves: no ST segment elevation. ST depression in lateral leads.     Interpreted Contemporaneously by me, independently viewed  No prior for comparison.     PROGRESS AND CONSULTS  ED Course     2310  Pt arrived in the ED     2321  Pt apneic and pulseless. Code blue called.     2322  Chest compressions in progress  IO placed in the LLE. D50 given    2323  EPI given     2324  Rhythm check. V-fib. Cardioversion attempt at 150J.     2327   Amiodarone given     2328  Pt intubated.   Epi given     2329  ETCO2=27    2331  Rhythm check. Pt in V-fib. Cardioversion attempt at 150J. Compressions resumed.     2332  ETCO2 27    2333  Rhythm check. Pt in V-fib. Cardioversion attempt at 200J    2333  ETCO2= 22    2334  Pulse check. PT=329  EKG ordered.     2334  2 mg of magnesium given.     2335  Call placed to interventional cardiology.     2338  Discussed pt's case with Dr Moya (interventional cardiology), who  "agrees to take the pt to the cardiac cath lab.     2341  BP= 91/81   Additional liter of IVF ordered     2346  Dr Moya requests that the hypothermia protocol be started.     23:48  Pt's friend called the ED and provides additional hx. He states that the pt was on the way to the ED as he \"wasn't feeling well\". While en route to the ED, pt became altered, and 5 minutes prior to arrival pt became unresponsive.      23:50  BP- 113/70 HR- (!) 128 Temp- 96.7 °F (35.9 °C) (Tympanic) O2 sat- 100%  Pt is alert and able to follow simple commands.    23:47  CBC, CMP, PT/INR, troponin, BNP, EKG, and CXR ordered.     23:58  Dr Moya at bedside.     0003  Pt in A-fib. Lopressor ordered.     0004  UDS ordered     00:15  After evaluation by Dr Moya, pt not a cath lab candidate. Pt will be admitted to the ICU to Dr Hurt.    00:18  Pt now admits to snorting cocaine.     00:20  CT head ordered.     MEDICAL DECISION MAKING  Results were reviewed/discussed with the patient and they were also made aware of online access. Pt also made aware that some labs, such as cultures, will not be resulted during ER visit and follow up with PMD is necessary.     MDM  Number of Diagnoses or Management Options  Altered mental status, unspecified altered mental status type:   Atrial fibrillation, unspecified type:   Cardiac arrest with ventricular fibrillation:      Amount and/or Complexity of Data Reviewed  Clinical lab tests: ordered and reviewed (Lactate=9.4, glucose=552)  Tests in the radiology section of CPT®: ordered and reviewed (CXR shows r effusion and CHF.)  Tests in the medicine section of CPT®: ordered and reviewed (See EKG procedure note. )  Discuss the patient with other providers: yes (Dr Moya, interventional cardiology. )    Critical Care  Total time providing critical care: 30-74 minutes         DIAGNOSIS  Final diagnoses:   Cardiac arrest with ventricular fibrillation   Atrial fibrillation, unspecified type   Altered mental " status, unspecified altered mental status type   Lactic acidosis   Substance abuse       DISPOSITION  ADMISSION    Discussed treatment plan and reason for admission with pt/family and admitting physician.  Pt/family voiced understanding of the plan for admission for further testing/treatment as needed.     Latest Documented Vital Signs:  As of 5:30 AM  BP- (!) 80/42 HR- 88 Temp- 96.7 °F (35.9 °C) (Tympanic) O2 sat- 90%    --  Documentation assistance provided by jay Espinal for Dr Rodrigez.  Information recorded by the scribnaomie was done at my direction and has been verified and validated by me.       Clarice Espinal  03/12/18 0119       Jimmy Rodrigez MD  03/12/18 0565

## 2018-03-12 NOTE — PLAN OF CARE
Problem: Fall Risk (Adult)  Goal: Identify Related Risk Factors and Signs and Symptoms  Outcome: Ongoing (interventions implemented as appropriate)    Goal: Absence of Fall  Outcome: Ongoing (interventions implemented as appropriate)      Problem: Skin Injury Risk (Adult)  Goal: Identify Related Risk Factors and Signs and Symptoms  Outcome: Ongoing (interventions implemented as appropriate)    Goal: Skin Health and Integrity  Outcome: Ongoing (interventions implemented as appropriate)      Problem: Restraint, Nonbehavioral (Nonviolent)  Goal: Rationale and Justification  Outcome: Ongoing (interventions implemented as appropriate)      Problem: Patient Care Overview  Goal: Plan of Care Review  Outcome: Ongoing (interventions implemented as appropriate)   03/12/18 9627   OTHER   Outcome Summary on multiple drips has had multiple bowel movemts. Awaiting lab results to see change in acidosis   Coping/Psychosocial   Plan of Care Reviewed With patient;mother   Plan of Care Review   Progress no change       Problem: Pain, Acute (Adult)  Goal: Identify Related Risk Factors and Signs and Symptoms  Outcome: Ongoing (interventions implemented as appropriate)    Goal: Acceptable Pain Control/Comfort Level  Outcome: Ongoing (interventions implemented as appropriate)

## 2018-03-12 NOTE — PROGRESS NOTES
"Shannan DOYLE Blane  1965 52 y.o.  [id number]      Patient Care Team:  No Known Provider as PCP - General    CC: Cardiac arrest, initial rhythm was ventricular fibrillation ECG did not show ST elevation and it was very suspicious circumstances as the patient was dropped off and the person dropped off left    Interval History: Sedated last light was awake.  Multiple drugs in his system including cocaine and THC and opioids, extremely foul-smelling stool      Objective   Vital Signs  Temp:  [96.7 °F (35.9 °C)] 96.7 °F (35.9 °C)  Heart Rate:  [0-140] 100  Resp:  [32-35] 34  BP: ()/() 89/57  FiO2 (%):  [40 %-100 %] 40 %    Intake/Output Summary (Last 24 hours) at 03/12/18 0716  Last data filed at 03/12/18 0534   Gross per 24 hour   Intake                0 ml   Output              150 ml   Net             -150 ml     Flowsheet Rows    Flowsheet Row First Filed Value   Admission Height 200.7 cm (79\") Documented at 03/11/2018 2344   Admission Weight (!)  141 kg (310 lb) ['s license] Documented at 03/11/2018 2344          Physical Exam:   General Appearance:   Intubated sedated in no acute distress   Lungs:     Clear to auscultation,BS are equal    Heart:    Normal S1 and S2, RRR without murmur, gallop or rub   HEENT:    Sclera are clear, no JVD or adenopathy   Abdomen:     Normal bowel sounds, soft non-tender, non-distended, no HSM   Extremities:   Moves all extremities well, no edema, no cyanosis, no             Redness, no rash     Medication Review:           diltiaZEM 5-15 mg/hr Last Rate: Stopped (03/12/18 0600)   propofol 5-50 mcg/kg/min Last Rate: 30 mcg/kg/min (03/12/18 0708)   sodium chloride  Last Rate: 1,000 mL/hr (03/11/18 2351)   sodium chloride 100 mL/hr Last Rate: 100 mL/hr (03/12/18 0200)         I reviewed the patient's new clinical results.  I personally viewed and interpreted the patient's EKG/Telemetry data    Assessment/Plan  Active Hospital Problems (** Indicates Principal Problem)    " Diagnosis Date Noted   • Cardiac arrest with ventricular fibrillation [I46.9, I49.01] 03/12/2018      Resolved Hospital Problems    Diagnosis Date Noted Date Resolved   No resolved problems to display.       This is a gentleman with originally a VF arrest initial ECG showed some ST depression but a follow-up 1 didn't show any he's been in A. fib troponins of been negative.  Does appear that he may have heart failure at least by his BNP.  We have attempted to rate control him and we'll diurese him.  We have are not taking them to the Cath Lab.  I will check an echo on him.  It looks like he may be a multi-substance abuse person is high MCV is probably suggestive of alcoholism    Valentin Moya MD  03/12/18  7:16 AM

## 2018-03-12 NOTE — PROGRESS NOTES
"Patient pointing at nose. Communicated on paper \"Cocaine\". When asked if he snorted Cocaine tonight, he shook his head yes.   "

## 2018-03-12 NOTE — PLAN OF CARE
Problem: Fall Risk (Adult)  Goal: Identify Related Risk Factors and Signs and Symptoms  Outcome: Ongoing (interventions implemented as appropriate)    Goal: Absence of Fall  Outcome: Ongoing (interventions implemented as appropriate)      Problem: Skin Injury Risk (Adult)  Goal: Identify Related Risk Factors and Signs and Symptoms  Outcome: Ongoing (interventions implemented as appropriate)    Goal: Skin Health and Integrity  Outcome: Ongoing (interventions implemented as appropriate)      Problem: Restraint, Nonbehavioral (Nonviolent)  Goal: Rationale and Justification  Outcome: Ongoing (interventions implemented as appropriate)    Goal: Nonbehavioral (Nonviolent) Restraint: Absence of Injury/Harm  Outcome: Ongoing (interventions implemented as appropriate)    Goal: Nonbehavioral (Nonviolent) Restraint: Achievement of Discontinuation Criteria  Outcome: Ongoing (interventions implemented as appropriate)    Goal: Nonbehavioral (Nonviolent) Restraint: Preservation of Dignity and Wellbeing  Outcome: Ongoing (interventions implemented as appropriate)

## 2018-03-12 NOTE — CONSULTS
Date of Hospital Visit: 18  Encounter Provider: Valentin Moya MD  Place of Service: [facility] CARDIOLOGY  Patient Name: Shannan Arguelles  :1965  [id number]  Referral Provider: No ref. provider found    Chief complaint cardiac arrest    History of Present Illness: 52-year-old gentleman brought into the emergency room and a cardiac arrest by a friend evidently he had been confused and was brought to the ER became unresponsive in the car shortly outside of the ER initial rhythm was ventricular fibrillation and was shocked 3 times intubated and we were called on arrival he is awake seems a little bit confused is not complaining of chest pain and is very clear about that no pain anywhere not able to get really much else out of the home he has diabetes he does not smoke      No past medical history on file.    No past surgical history on file.      (Not in a hospital admission)    Current Meds  Scheduled Meds:  sodium chloride 1,000 mL Intravenous Once     Continuous Infusions:  sodium chloride  Last Rate: 1,000 mL/hr (18 3491)     PRN Meds:.Insert peripheral IV **AND** sodium chloride  •  sodium chloride    Allergies as of 2018   • (Not on File)       Social History     Social History   • Marital status: Single     Spouse name: N/A   • Number of children: N/A   • Years of education: N/A     Occupational History   • Not on file.     Social History Main Topics   • Smoking status: Not on file   • Smokeless tobacco: Not on file   • Alcohol use Not on file   • Drug use: Unknown   • Sexual activity: Not on file     Other Topics Concern   • Not on file     Social History Narrative   • No narrative on file       No family history on file.    REVIEW OF SYSTEMS:   ROS was performed and is negative except as outlined in HPI     REVIEW OF SYSTEMS:   CONSTITUTIONAL: No weight loss, fever, chills, weakness or fatigue.   HEENT: Eyes: No visual loss, blurred vision, double vision or  "yellow sclerae. Ears, Nose, Throat: No hearing loss, sneezing, congestion, runny nose or sore throat.   SKIN: No rash or itching.     RESPIRATORY: No shortness of breath, hemoptysis, cough or sputum.   GASTROINTESTINAL: No anorexia, nausea, vomiting or diarrhea. No abdominal pain, bright red blood per rectum or melena.  GENITOURINARY: No burning on urination, hematuria or increased frequency.  NEUROLOGICAL: No headache, dizziness, syncope, paralysis, ataxia, numbness or tingling in the extremities. No change in bowel or bladder control.   MUSCULOSKELETAL: No muscle, back pain, joint pain or stiffness.   HEMATOLOGIC: No anemia, bleeding or bruising.   LYMPHATICS: No enlarged nodes. No history of splenectomy.   PSYCHIATRIC: No history of depression, anxiety, hallucinations.   ENDOCRINOLOGIC: No reports of sweating, cold or heat intolerance. No polyuria or polydipsia.       Objective:   Temp:  [96.7 °F (35.9 °C)] 96.7 °F (35.9 °C)  Heart Rate:  [0-140] 128  Resp:  [35] 35  BP: ()/(50-81) 113/70  Body mass index is 34.92 kg/m².  Flowsheet Rows    Flowsheet Row First Filed Value   Admission Height 200.7 cm (79\") Documented at 03/11/2018 2344   Admission Weight (!)  141 kg (310 lb) ['s license] Documented at 03/11/2018 2344        Vitals:    03/11/18 2355   BP:    Pulse: (!) 128   Resp: (!) 35   Temp:    SpO2: 100%       Head:    Normocephalic, without obvious abnormality, atraumatic   Eyes:            Lids and lashes normal, conjunctivae and sclerae normal, no   icterus, no pallor   Ears:    Ears appear intact with no abnormalities noted   Throat:   No oral lesions, dentition good   Neck:   No adenopathy, supple, trachea midline, no thyromegaly, no   carotid bruit, no JVD   Lungs:     Breath sounds are equal and clear to auscultation    Heart:    Normal S1 and S2, Irregular RRR, No M/G/R   Abdomen:     Normal bowel sounds, no masses, no organomegaly, soft        non-tender, non-distended, no guarding "   Extremities:   Moves all extremities well, no edema, Has mottling on both of his feet    Pulses:   Pulses palpable and equal bilaterally.    Skin:  Psychiatric:   No bleeding, bruising or rash    Awake,Answers questions is intubated, normal mood and affect             I personally viewed and interpreted the patient's EKG/Telemetry data    Assessment:  There are no hospital problems to display for this patient.      Plan:This is a young man with diabetes who has a VF arrest his ECG shows A. fib with a rapid ventricular response I've given him some IV metoprolol and slowed his rate down he does not have ST elevation and really doesn't have much in the way of ST depression blood glucose was 400 no other labs are been sent a blood gas was drawn and was 6.9 PCO2 was 70 PO2 was about 67.  I think there is something else going on besides a pure cardiac cause for his arrest and I am not taking him to the Cath Lab at this time I've asked Dr. Howard Hurt to admit him he's come down and seen him in the emergency room and is going to take care of him upstairs he is obviously critically ill I wonder about an infection and diabetes but I'm unsure    Valentin Moya MD  03/12/18  12:21 AM.

## 2018-03-12 NOTE — ED NOTES
Pt stated that he did cocaine today.   Stated he does cocaine about three times a week.   Pt states that he last did cocaine at 2000     Lianne Skinner RN  03/12/18 0023

## 2018-03-12 NOTE — ED NOTES
Pt had Arctic sun pads placed on him but it was never turned on.   Dr. Moya came into the room and stated that since the patient was awake we did not need to start on cooling the pt.      Lianne Skinner RN  03/12/18 0836

## 2018-03-12 NOTE — H&P
[facility]  Phone: [phone]  Howard Hurt MD      Subjective   LOS: 0 days     52-year-old male was dropped off to the ED via private vehicle.  His initial cardiac rhythm is recorded as asystole with subsequent rhythms off V. fib.  He was shocked in the ED return of systemic circulation.  He was intubated.  Initial impression was acute ST elevation MI.  His heart rhythm is showing A. fib.  He has been seen by cardiology and I discussed with them.  They do not feel he requires immediate catheter lab.  His initial ABG on the ventilator was reviewed.  No other labs are available.  Chest x-ray shows CHF.  He is being admitted to the intensive care unit.  No other history is available as we do not have any family members available or at the bedside.  There is nothing in the medical records.     I have reviewed and edited the Past Medical History, Past Surgical History, Home Medications, Social History and Family History as of 1:35 AM on 18.    No prescriptions prior to admission.     No Known Allergies    Review of Systems   Unable to perform ROS: Intubated       Vital Signs past 24hrs  BP range: BP: ()/() 101/42  Pulse range: Heart Rate:  [0-140] 106  Resp rate range: Resp:  [32-35] 32  Temp range: Temp (24hrs), Av.7 °F (35.9 °C), Min:96.7 °F (35.9 °C), Max:96.7 °F (35.9 °C)    Oxygen range: SpO2:  [86 %-100 %] 98 %;  ;   Device (Oxygen Therapy): ventilator  (!) 141 kg (310 lb); Body mass index is 34.92 kg/m².  No intake/output data recorded.    Adult male who is awake and alert.  Nods head appropriately.  Pupils are small and reactive to light.  Oral ET tube is noted.  Lungs reveal few crackles in the left lung base at least anteriorly.  No wheezing.  Percussion note resonant.  Chest expansion equal.  No chest wall deformity or tenderness.  Heart examination S1-S2 present.  Rhythm is regular.  Initially tachycardic subsequently received some metoprolol IV with control of  heart rate.  No murmurs appreciated.  Extremities with no edema.  Abdomen is morbidly obese, soft, nontender.  Bowel sounds present.  No liver spleen enlargement noted.  No peripheral cyanosis clubbing noted.  No cervical, axillary, inguinal adenopathy noted.  Patient is moving all 4 extremities and sensory appears intact.  He nods his head appropriately in answer to questions.    Results Review:    I have reviewed the laboratory and imaging data from current admission. My annotations are as noted in assessment and plan.    Medication Review:  I have reviewed the current MAR. My annotations are as noted in assessment and plan.    Plan   PCCM Problems  Resuscitated V. fib arrest  Acute respiratory failure, mechanical ventilation  Atrial fibrillation presumed new onset  Positive UDS  Acute kidney injury  Severe lactic acidosis    Plan of Treatment  Resuscitated V. fib arrest.  Evaluated by cardiology and holding on cath lab for now.  Based on his UDS results likely cocaine induced.    Acute respiratory failure due to above and presently on mechanical ventilation.    Atrial fibrillation was controlled with meds for now.  I will start him on full dose anticoagulation.  Patient received a dose of beta blockers in the ED.  We will use diltiazem drip as beta blockers are relatively contraindicated with cocaine induced cardiovascular health events.    Acute kidney injury due to above events with ATN and possibly underlying disease.  Give fluids for now and observe.    Severe lactic acidosis could be all just post resuscitation.  Check another lactic acid in 4 hours.  I don't believe this patient has septic shock.    I spent +45 mins critical care time in care of this patient outside of any procedures.     Part of this note may be an electronic transcription/translation of spoken language to printed text using the Dragon Dictation System.

## 2018-03-13 LAB
ALBUMIN SERPL-MCNC: 2.8 G/DL (ref 3.5–5.2)
ALBUMIN/GLOB SERPL: 1.1 G/DL
ALP SERPL-CCNC: 55 U/L (ref 39–117)
ALT SERPL W P-5'-P-CCNC: 71 U/L (ref 1–41)
ANION GAP SERPL CALCULATED.3IONS-SCNC: 14.4 MMOL/L
ARTERIAL PATENCY WRIST A: POSITIVE
AST SERPL-CCNC: 45 U/L (ref 1–40)
ATMOSPHERIC PRESS: 751.5 MMHG
BASE EXCESS BLDA CALC-SCNC: -1.5 MMOL/L (ref 0–2)
BASOPHILS # BLD AUTO: 0.02 10*3/MM3 (ref 0–0.2)
BASOPHILS NFR BLD AUTO: 0.1 % (ref 0–1.5)
BDY SITE: ABNORMAL
BILIRUB SERPL-MCNC: 1 MG/DL (ref 0.1–1.2)
BUN BLD-MCNC: 43 MG/DL (ref 6–20)
BUN/CREAT SERPL: 22.2 (ref 7–25)
C DIFF TOX GENS STL QL NAA+PROBE: NEGATIVE
CALCIUM SPEC-SCNC: 7.7 MG/DL (ref 8.6–10.5)
CHLORIDE SERPL-SCNC: 105 MMOL/L (ref 98–107)
CO2 SERPL-SCNC: 23.6 MMOL/L (ref 22–29)
CREAT BLD-MCNC: 1.94 MG/DL (ref 0.76–1.27)
DEPRECATED RDW RBC AUTO: 49.7 FL (ref 37–54)
EOSINOPHIL # BLD AUTO: 0.02 10*3/MM3 (ref 0–0.7)
EOSINOPHIL NFR BLD AUTO: 0.1 % (ref 0.3–6.2)
ERYTHROCYTE [DISTWIDTH] IN BLOOD BY AUTOMATED COUNT: 13.6 % (ref 11.5–14.5)
GFR SERPL CREATININE-BSD FRML MDRD: 37 ML/MIN/1.73
GLOBULIN UR ELPH-MCNC: 2.5 GM/DL
GLUCOSE BLD-MCNC: 140 MG/DL (ref 65–99)
GLUCOSE BLDC GLUCOMTR-MCNC: 110 MG/DL (ref 70–130)
GLUCOSE BLDC GLUCOMTR-MCNC: 114 MG/DL (ref 70–130)
GLUCOSE BLDC GLUCOMTR-MCNC: 119 MG/DL (ref 70–130)
GLUCOSE BLDC GLUCOMTR-MCNC: 119 MG/DL (ref 70–130)
GLUCOSE BLDC GLUCOMTR-MCNC: 122 MG/DL (ref 70–130)
GLUCOSE BLDC GLUCOMTR-MCNC: 130 MG/DL (ref 70–130)
GLUCOSE BLDC GLUCOMTR-MCNC: 148 MG/DL (ref 70–130)
GLUCOSE BLDC GLUCOMTR-MCNC: 165 MG/DL (ref 70–130)
GLUCOSE BLDC GLUCOMTR-MCNC: 171 MG/DL (ref 70–130)
GLUCOSE BLDC GLUCOMTR-MCNC: 222 MG/DL (ref 70–130)
GLUCOSE BLDC GLUCOMTR-MCNC: 251 MG/DL (ref 70–130)
GLUCOSE BLDC GLUCOMTR-MCNC: 264 MG/DL (ref 70–130)
HCO3 BLDA-SCNC: 22.6 MMOL/L (ref 22–28)
HCT VFR BLD AUTO: 43.6 % (ref 40.4–52.2)
HGB BLD-MCNC: 14.3 G/DL (ref 13.7–17.6)
HOROWITZ INDEX BLD+IHG-RTO: 30 %
IMM GRANULOCYTES # BLD: 0.04 10*3/MM3 (ref 0–0.03)
IMM GRANULOCYTES NFR BLD: 0.2 % (ref 0–0.5)
LYMPHOCYTES # BLD AUTO: 1.88 10*3/MM3 (ref 0.9–4.8)
LYMPHOCYTES NFR BLD AUTO: 11.6 % (ref 19.6–45.3)
MAGNESIUM SERPL-MCNC: 2.1 MG/DL (ref 1.6–2.6)
MCH RBC QN AUTO: 32.8 PG (ref 27–32.7)
MCHC RBC AUTO-ENTMCNC: 32.8 G/DL (ref 32.6–36.4)
MCV RBC AUTO: 100 FL (ref 79.8–96.2)
MODALITY: ABNORMAL
MONOCYTES # BLD AUTO: 2.03 10*3/MM3 (ref 0.2–1.2)
MONOCYTES NFR BLD AUTO: 12.6 % (ref 5–12)
NEUTROPHILS # BLD AUTO: 12.18 10*3/MM3 (ref 1.9–8.1)
NEUTROPHILS NFR BLD AUTO: 75.4 % (ref 42.7–76)
O2 A-A PPRESDIFF RESPIRATORY: 0.4 MMHG
PCO2 BLDA: 35 MM HG (ref 35–45)
PEEP RESPIRATORY: 5 CM[H2O]
PH BLDA: 7.42 PH UNITS (ref 7.35–7.45)
PHOSPHATE SERPL-MCNC: 4.3 MG/DL (ref 2.5–4.5)
PLATELET # BLD AUTO: 240 10*3/MM3 (ref 140–500)
PMV BLD AUTO: 11 FL (ref 6–12)
PO2 BLDA: 74 MM HG (ref 80–100)
POTASSIUM BLD-SCNC: 3.6 MMOL/L (ref 3.5–5.2)
PROT SERPL-MCNC: 5.3 G/DL (ref 6–8.5)
PSV: 6 CMH2O
RBC # BLD AUTO: 4.36 10*6/MM3 (ref 4.6–6)
SAO2 % BLDCOA: 95.1 % (ref 92–99)
SODIUM BLD-SCNC: 143 MMOL/L (ref 136–145)
TOTAL RATE: 23 BREATHS/MINUTE
TROPONIN T SERPL-MCNC: 0.13 NG/ML (ref 0–0.03)
URATE SERPL-MCNC: 6 MG/DL (ref 3.4–7)
VENTILATOR MODE: ABNORMAL
VT ON VENT VENT: 885 ML
WBC NRBC COR # BLD: 16.17 10*3/MM3 (ref 4.5–10.7)

## 2018-03-13 PROCEDURE — 80053 COMPREHEN METABOLIC PANEL: CPT | Performed by: INTERNAL MEDICINE

## 2018-03-13 PROCEDURE — 87046 STOOL CULTR AEROBIC BACT EA: CPT | Performed by: NURSE PRACTITIONER

## 2018-03-13 PROCEDURE — 94799 UNLISTED PULMONARY SVC/PX: CPT

## 2018-03-13 PROCEDURE — 87493 C DIFF AMPLIFIED PROBE: CPT | Performed by: NURSE PRACTITIONER

## 2018-03-13 PROCEDURE — 83735 ASSAY OF MAGNESIUM: CPT | Performed by: INTERNAL MEDICINE

## 2018-03-13 PROCEDURE — 63710000001 INSULIN ASPART PER 5 UNITS: Performed by: INTERNAL MEDICINE

## 2018-03-13 PROCEDURE — 36600 WITHDRAWAL OF ARTERIAL BLOOD: CPT

## 2018-03-13 PROCEDURE — 25010000002 PROPOFOL 1000 MG/ML EMULSION: Performed by: EMERGENCY MEDICINE

## 2018-03-13 PROCEDURE — 87899 AGENT NOS ASSAY W/OPTIC: CPT | Performed by: NURSE PRACTITIONER

## 2018-03-13 PROCEDURE — 94003 VENT MGMT INPAT SUBQ DAY: CPT

## 2018-03-13 PROCEDURE — 84100 ASSAY OF PHOSPHORUS: CPT | Performed by: INTERNAL MEDICINE

## 2018-03-13 PROCEDURE — 25010000002 PIPERACILLIN SOD-TAZOBACTAM PER 1 G: Performed by: INTERNAL MEDICINE

## 2018-03-13 PROCEDURE — 82962 GLUCOSE BLOOD TEST: CPT

## 2018-03-13 PROCEDURE — 93005 ELECTROCARDIOGRAM TRACING: CPT | Performed by: INTERNAL MEDICINE

## 2018-03-13 PROCEDURE — 99232 SBSQ HOSP IP/OBS MODERATE 35: CPT | Performed by: INTERNAL MEDICINE

## 2018-03-13 PROCEDURE — 87045 FECES CULTURE AEROBIC BACT: CPT | Performed by: NURSE PRACTITIONER

## 2018-03-13 PROCEDURE — 84484 ASSAY OF TROPONIN QUANT: CPT | Performed by: INTERNAL MEDICINE

## 2018-03-13 PROCEDURE — 85025 COMPLETE CBC W/AUTO DIFF WBC: CPT | Performed by: INTERNAL MEDICINE

## 2018-03-13 PROCEDURE — 87209 SMEAR COMPLEX STAIN: CPT | Performed by: NURSE PRACTITIONER

## 2018-03-13 PROCEDURE — 87177 OVA AND PARASITES SMEARS: CPT | Performed by: NURSE PRACTITIONER

## 2018-03-13 PROCEDURE — 84550 ASSAY OF BLOOD/URIC ACID: CPT | Performed by: INTERNAL MEDICINE

## 2018-03-13 PROCEDURE — 93010 ELECTROCARDIOGRAM REPORT: CPT | Performed by: INTERNAL MEDICINE

## 2018-03-13 PROCEDURE — 82803 BLOOD GASES ANY COMBINATION: CPT

## 2018-03-13 RX ORDER — DEXTROSE MONOHYDRATE 25 G/50ML
25 INJECTION, SOLUTION INTRAVENOUS
Status: DISCONTINUED | OUTPATIENT
Start: 2018-03-13 | End: 2018-03-17

## 2018-03-13 RX ORDER — NICOTINE POLACRILEX 4 MG
15 LOZENGE BUCCAL
Status: DISCONTINUED | OUTPATIENT
Start: 2018-03-13 | End: 2018-03-17

## 2018-03-13 RX ORDER — SODIUM CHLORIDE, SODIUM LACTATE, POTASSIUM CHLORIDE, CALCIUM CHLORIDE 600; 310; 30; 20 MG/100ML; MG/100ML; MG/100ML; MG/100ML
100 INJECTION, SOLUTION INTRAVENOUS ONCE
Status: DISCONTINUED | OUTPATIENT
Start: 2018-03-13 | End: 2018-03-13

## 2018-03-13 RX ORDER — SODIUM CHLORIDE, SODIUM LACTATE, POTASSIUM CHLORIDE, CALCIUM CHLORIDE 600; 310; 30; 20 MG/100ML; MG/100ML; MG/100ML; MG/100ML
100 INJECTION, SOLUTION INTRAVENOUS CONTINUOUS
Status: DISCONTINUED | OUTPATIENT
Start: 2018-03-13 | End: 2018-03-14

## 2018-03-13 RX ORDER — DEXMEDETOMIDINE HYDROCHLORIDE 4 UG/ML
.2-1.5 INJECTION, SOLUTION INTRAVENOUS
Status: DISCONTINUED | OUTPATIENT
Start: 2018-03-13 | End: 2018-03-14

## 2018-03-13 RX ORDER — INSULIN GLARGINE 100 [IU]/ML
29 INJECTION, SOLUTION SUBCUTANEOUS NIGHTLY
COMMUNITY
End: 2019-10-02

## 2018-03-13 RX ORDER — ATORVASTATIN CALCIUM 40 MG/1
40 TABLET, FILM COATED ORAL DAILY
COMMUNITY

## 2018-03-13 RX ORDER — LISINOPRIL 10 MG/1
10 TABLET ORAL DAILY
COMMUNITY
End: 2019-10-02

## 2018-03-13 RX ADMIN — SODIUM CHLORIDE 100 ML/HR: 9 INJECTION, SOLUTION INTRAVENOUS at 02:22

## 2018-03-13 RX ADMIN — TAZOBACTAM SODIUM AND PIPERACILLIN SODIUM 3.38 G: 375; 3 INJECTION, SOLUTION INTRAVENOUS at 01:57

## 2018-03-13 RX ADMIN — SODIUM CHLORIDE 30 ML/HR: 9 INJECTION, SOLUTION INTRAVENOUS at 08:07

## 2018-03-13 RX ADMIN — DEXMEDETOMIDINE HYDROCHLORIDE 0.6 MCG/KG/HR: 4 INJECTION, SOLUTION INTRAVENOUS at 11:11

## 2018-03-13 RX ADMIN — TAZOBACTAM SODIUM AND PIPERACILLIN SODIUM 3.38 G: 375; 3 INJECTION, SOLUTION INTRAVENOUS at 10:07

## 2018-03-13 RX ADMIN — INSULIN ASPART 5 UNITS: 100 INJECTION, SOLUTION INTRAVENOUS; SUBCUTANEOUS at 19:43

## 2018-03-13 RX ADMIN — SODIUM CHLORIDE 15 MG/HR: 900 INJECTION, SOLUTION INTRAVENOUS at 08:27

## 2018-03-13 RX ADMIN — PANTOPRAZOLE SODIUM 40 MG: 40 INJECTION, POWDER, FOR SOLUTION INTRAVENOUS at 06:55

## 2018-03-13 RX ADMIN — INSULIN ASPART 3 UNITS: 100 INJECTION, SOLUTION INTRAVENOUS; SUBCUTANEOUS at 12:00

## 2018-03-13 RX ADMIN — DEXMEDETOMIDINE HYDROCHLORIDE 0.6 MCG/KG/HR: 4 INJECTION, SOLUTION INTRAVENOUS at 13:34

## 2018-03-13 RX ADMIN — SODIUM CHLORIDE 10 MG/HR: 900 INJECTION, SOLUTION INTRAVENOUS at 13:34

## 2018-03-13 RX ADMIN — Medication 0.9 MCG/KG/MIN: at 01:22

## 2018-03-13 RX ADMIN — SODIUM CHLORIDE 3 UNITS/HR: 9 INJECTION, SOLUTION INTRAVENOUS at 06:37

## 2018-03-13 RX ADMIN — PROPOFOL 35 MCG/KG/MIN: 10 INJECTION, EMULSION INTRAVENOUS at 05:57

## 2018-03-13 RX ADMIN — DEXMEDETOMIDINE HYDROCHLORIDE 0.3 MCG/KG/HR: 4 INJECTION, SOLUTION INTRAVENOUS at 18:56

## 2018-03-13 RX ADMIN — SODIUM CHLORIDE, POTASSIUM CHLORIDE, SODIUM LACTATE AND CALCIUM CHLORIDE 100 ML/HR: 600; 310; 30; 20 INJECTION, SOLUTION INTRAVENOUS at 15:53

## 2018-03-13 RX ADMIN — INSULIN ASPART 8 UNITS: 100 INJECTION, SOLUTION INTRAVENOUS; SUBCUTANEOUS at 16:00

## 2018-03-13 RX ADMIN — SODIUM CHLORIDE 500 ML: 9 INJECTION, SOLUTION INTRAVENOUS at 14:25

## 2018-03-13 RX ADMIN — INSULIN ASPART 8 UNITS: 100 INJECTION, SOLUTION INTRAVENOUS; SUBCUTANEOUS at 23:43

## 2018-03-13 RX ADMIN — DEXMEDETOMIDINE HYDROCHLORIDE 0.4 MCG/KG/HR: 4 INJECTION, SOLUTION INTRAVENOUS at 15:39

## 2018-03-13 RX ADMIN — DEXMEDETOMIDINE HYDROCHLORIDE 0.2 MCG/KG/HR: 4 INJECTION, SOLUTION INTRAVENOUS at 09:28

## 2018-03-13 RX ADMIN — TAZOBACTAM SODIUM AND PIPERACILLIN SODIUM 3.38 G: 375; 3 INJECTION, SOLUTION INTRAVENOUS at 17:01

## 2018-03-13 RX ADMIN — Medication 0.5 MCG/KG/MIN: at 17:01

## 2018-03-13 NOTE — PROGRESS NOTES
"      [facility]         Dr Mckeon Sayied   LOS: 1 day   Patient Care Team:  No Known Provider as PCP - General    Chief Complaint: Resuscitated V. fib arrest in the setting of positive drug screen.  Persistent lactic acidosis and acute kidney injury and hyperkalemia.    Interval History: Events noted chart reviewed.  He is a little more restless this morning despite being on propofol.  Currently on spontaneous breathing trial wakes up follow simple commands.  He is tachycardic on the monitor despite on Cardizem drip.    REVIEW OF SYSTEMS:   Sedated intubated.    Ventilator/Non-Invasive Ventilation Settings     Start     Ordered    03/12/18 0437  Ventilator - AC/PC; 10; 40; 5; 15  Continuous     Question Answer Comment   Vent Mode AC/PC    Breath rate 10    FiO2 40    PEEP 5    Inspiratory Pressure 15        03/12/18 0437      Failed spontaneous breathing trial today.      Vital Signs  Temp:  [99.5 °F (37.5 °C)] 99.5 °F (37.5 °C)  Heart Rate:  [] 114  Resp:  [20-24] 23  BP: ()/() 111/72  FiO2 (%):  [30 %-40 %] 30 %    Intake/Output Summary (Last 24 hours) at 03/13/18 0945  Last data filed at 03/13/18 0926   Gross per 24 hour   Intake           8217.3 ml   Output             1725 ml   Net           6492.3 ml     Flowsheet Rows    Flowsheet Row First Filed Value   Admission Height 200.7 cm (79\") Documented at 03/11/2018 2344   Admission Weight (!)  141 kg (310 lb) ['s license] Documented at 03/11/2018 2344          Physical Exam:   General Appearance:    Sedated sedated intubated.  Following simple commands.  ET tube good position orally.  Not a lot of purulent secretions reported.     Lungs:     Equal breath sounds with rhonchi and crackles on the bases.      Heart:    Regular rhythm and normal rate, normal S1 and S2, no            murmur, no gallop, no rub, no click   Chest Wall:    No abnormalities observed   Abdomen:    Soft mildly distended.  Diffuse tenderness no rebound or " guarding.     Extremities:   Moves all extremities well, 1+ edema, no cyanosis, no             redness     Results Review:          Results from last 7 days  Lab Units 03/13/18  0409 03/12/18  1811 03/12/18  0941   SODIUM mmol/L 143 136 135*   POTASSIUM mmol/L 3.6 4.0 5.1   CHLORIDE mmol/L 105 100 100   CO2 mmol/L 23.6 20.7* 16.2*   BUN mg/dL 43* 46* 39*   CREATININE mg/dL 1.94* 2.39* 2.36*   GLUCOSE mg/dL 140* 274* 498*   CALCIUM mg/dL 7.7* 8.1* 8.0*       Results from last 7 days  Lab Units 03/13/18  0409 03/12/18  0019   TROPONIN T ng/mL 0.131* <0.010       Results from last 7 days  Lab Units 03/13/18  0409 03/12/18  1958 03/12/18  0413 03/12/18  0019   WBC 10*3/mm3 16.17*  --  27.27* 17.04*  16.06*   HEMOGLOBIN g/dL 14.3 15.5 16.7 15.3  15.6   HEMATOCRIT % 43.6 46.3 50.2 48.4  49.3   PLATELETS 10*3/mm3 240  --  294 265  256       Results from last 7 days  Lab Units 03/12/18  0413 03/12/18  0019   INR  1.24* 1.29*  1.30*   APTT seconds  --  35.6*  35.4           Results from last 7 days  Lab Units 03/13/18  0409   MAGNESIUM mg/dL 2.1           Results from last 7 days  Lab Units 03/12/18  0358   PH, ARTERIAL pH units 7.267*   PO2 ART mm Hg 82.6   PCO2, ARTERIAL mm Hg 39.1   HCO3 ART mmol/L 17.8*       I reviewed the patient's new clinical results.  I personally viewed and interpreted the patient's CXR        Medication Review:     insulin aspart 0-14 Units Subcutaneous Q4H   pantoprazole 40 mg Intravenous QAM AC   piperacillin-tazobactam 3.375 g Intravenous Q8H         dexmedetomidine 0.2-1.5 mcg/kg/hr Last Rate: 0.2 mcg/kg/hr (03/13/18 0928)   diltiaZEM 5-15 mg/hr Last Rate: 15 mg/hr (03/13/18 0827)   phenylephrine 0.5-3 mcg/kg/min Last Rate: Stopped (03/13/18 0830)   sodium chloride  Last Rate: 1,000 mL/hr (03/11/18 5301)   sodium chloride 100 mL/hr Last Rate: 100 mL/hr (03/13/18 0222)   sodium chloride 30 mL/hr Last Rate: 30 mL/hr (03/13/18 0807)       ASSESSMENT:   Resuscitated V. fib arrest  Acute  respiratory failure, mechanical ventilation  Aspiration pneumonia  Sinus tachycardia  Atrial fibrillation presumed new onset  Positive UDS  Acute kidney injury with severe hyperkalemia  Severe lactic acidosis  Possible GI bleed    PLAN:  Continue supportive care  Improving leukocytosis and lactic acidosis now   IV fluids per renal  Trial of spontaneous breathing trial.  If tolerates well may consider extubation today.  We  will transition to Precedex drip to control his agitation  Continue Zosyn for possible aspiration pneumonia  Hemoglobin stable with no evidence of active bleeding noted.  No family at bedside  Continue current care      Linda Blank MD  03/13/18  9:45 AM      Time: Critical care 35 min

## 2018-03-13 NOTE — PROCEDURES
Central Venous Catheter Insertion Procedure Note      Indications:  vascular access    Procedure Details   Informed consent was obtained for the procedure, including sedation.  Risks of lung perforation, hemorrhage, arrhythmia, and adverse drug reaction were discussed.     Maximum sterile technique was used including usual patient drapes, antiseptics and physician sterile garments.    Under sterile conditions the skin above the at base of throat internal jugular vein was prepped with Chlorhexidine and covered with a sterile drape. Local anesthesia was applied to the skin and subcutaneous tissues with lidocaine 1%. An 18-gauge needle was then inserted into the vein. A guide wire was then passed easily through the catheter. A quad lumen was then inserted into the vessel over the guide wire. The catheter was sutured into place and dressed following sterile protocol with Biopatch placed.    Findings:  There were no changes to vital signs. All catheter ports were flushed with saline. Patient did tolerate procedure well.    Recommendations:  CXR ordered to verify placement. No chest x-ray ordered if this was a femoral vein.    Linda Blank MD  3/12/2018

## 2018-03-13 NOTE — PROGRESS NOTES
"Shannan DOYLE Blane  1965 52 y.o.  [id number]      Patient Care Team:  No Known Provider as PCP - General    CC: Cardiac arrest, initial rhythm was ventricular fibrillation ECG did not show ST elevation and it was very suspicious circumstances as the patient was dropped off and the person dropped off left    Interval History: Sedated on ventilator      Objective   Vital Signs  Temp:  [99.1 °F (37.3 °C)-99.5 °F (37.5 °C)] 99.1 °F (37.3 °C)  Heart Rate:  [] 84  Resp:  [20-24] 23  BP: ()/() 82/69  FiO2 (%):  [30 %-40 %] 30 %    Intake/Output Summary (Last 24 hours) at 03/13/18 1229  Last data filed at 03/13/18 1135   Gross per 24 hour   Intake           8818.3 ml   Output             2150 ml   Net           6668.3 ml     Flowsheet Rows    Flowsheet Row First Filed Value   Admission Height 200.7 cm (79\") Documented at 03/11/2018 2344   Admission Weight (!)  141 kg (310 lb) ['s license] Documented at 03/11/2018 2344          Physical Exam:   General Appearance:   Intubated sedated in no acute distress   Lungs:     Clear to auscultation,BS are equal    Heart:    Normal S1 and S2, RRR without murmur, gallop or rub   HEENT:    Sclera are clear, no JVD or adenopathy   Abdomen:     Normal bowel sounds, soft non-tender, non-distended, no HSM   Extremities:   Moves all extremities well, no edema, no cyanosis, no             Redness, no rash     Medication Review:        insulin aspart 0-14 Units Subcutaneous Q4H   pantoprazole 40 mg Intravenous QAM AC   piperacillin-tazobactam 3.375 g Intravenous Q8H       dexmedetomidine 0.2-1.5 mcg/kg/hr Last Rate: 0.6 mcg/kg/hr (03/13/18 1111)   diltiaZEM 5-15 mg/hr Last Rate: 15 mg/hr (03/13/18 0827)   phenylephrine 0.5-3 mcg/kg/min Last Rate: Stopped (03/13/18 0830)   sodium chloride  Last Rate: 1,000 mL/hr (03/11/18 2351)         I reviewed the patient's new clinical results.  I personally viewed and interpreted the patient's EKG/Telemetry " data    Assessment/Plan  Active Hospital Problems (** Indicates Principal Problem)    Diagnosis Date Noted   • Cardiac arrest with ventricular fibrillation [I46.9, I49.01] 03/12/2018      Resolved Hospital Problems    Diagnosis Date Noted Date Resolved   No resolved problems to display.       Polysubstance abuse VF arrest severe cardiomyopathy at some point will need a catheter when he recovers he is in A. fib for controlling his rate continue to support him    Valentin Moya MD  03/13/18  12:29 PM

## 2018-03-13 NOTE — PROGRESS NOTES
[facility]  Inpatient Progress Note    Reason for Follow Up:  Blood tinged diarrhea, elevated liver enzymes    Subjective     Interval History:   Remains intubated.  Foul smelling tan (no blood) stool sent to lab this morning for stool studies.  Restless despite propofol    Current Facility-Administered Medications:   •  amiodarone (CORDARONE) injection, , Intravenous, Code / Trauma / Sedation Medication, Jimmy Rodrigez MD, 300 mg at 03/11/18 2327  •  diltiaZEM (CARDIZEM) 100 mg/100 mL (1 mg/mL) 0.9% NS, 5-15 mg/hr, Intravenous, Titrated, Valentin Moya MD, Last Rate: 15 mL/hr at 03/13/18 0827, 15 mg/hr at 03/13/18 0827  •  insulin regular (HumuLIN R,NovoLIN R) 100 Units in sodium chloride 0.9 % 100 mL (1 Units/mL) infusion, 0-50 Units/hr, Intravenous, Titrated, Linda Blank MD, Last Rate: 3 mL/hr at 03/13/18 0637, 3 Units/hr at 03/13/18 0637  •  magnesium sulfate injection, , , Code / Trauma / Sedation Medication, Jimmy Rodrigez MD, 2 g at 03/11/18 2334  •  ondansetron (ZOFRAN) injection 4 mg, 4 mg, Intravenous, Q4H PRN, Howard Hurt MD, 4 mg at 03/12/18 0209  •  pantoprazole (PROTONIX) injection 40 mg, 40 mg, Intravenous, QA AC, Linda Blank MD, 40 mg at 03/13/18 0655  •  phenylephrine (BREANA-SYNEPHRINE) 50 mg/250 mL (0.2 mg/mL) in 0.9% NS  infusion, 0.5-3 mcg/kg/min, Intravenous, Titrated, Valentin Moya MD, Last Rate: 21.5 mL/hr at 03/13/18 0809, 0.5 mcg/kg/min at 03/13/18 0809  •  piperacillin-tazobactam (ZOSYN) 3.375 g in iso-osmotic dextrose 50 ml (premix), 3.375 g, Intravenous, Q8H, Linda Blank MD, 3.375 g at 03/13/18 0157  •  propofol (DIPRIVAN) infusion 10 mg/mL 100 mL, 5-50 mcg/kg/min, Intravenous, Titrated, Jimmy Rodrigez MD, Last Rate: 29.6 mL/hr at 03/13/18 0557, 35 mcg/kg/min at 03/13/18 0557  •  Insert peripheral IV, , , Once **AND** sodium chloride 0.9 % flush 10 mL, 10 mL, Intravenous, PRN, Jimmy Rodrigez MD  •  sodium chloride 0.9 % infusion, ,  Intravenous, Code / Trauma / Sedation Continuous Med, Jimmy Rodrigez MD, Last Rate: 1,000 mL/hr at 03/11/18 2351, 1,000 mL/hr at 03/11/18 2351  •  sodium chloride 0.9 % infusion, 100 mL/hr, Intravenous, Continuous, Howard Hurt MD, Last Rate: 100 mL/hr at 03/13/18 0222, 100 mL/hr at 03/13/18 0222  •  sodium chloride 0.9 % infusion, 30 mL/hr, Intravenous, Continuous, Linda Blank MD, Last Rate: 30 mL/hr at 03/13/18 0807, 30 mL/hr at 03/13/18 0807  Review of Systems:    Review of systems could not be obtained due to  patient intubated.    Objective     Vital Signs  Temp:  [99.5 °F (37.5 °C)] 99.5 °F (37.5 °C)  Heart Rate:  [] 114  Resp:  [20-27] 23  BP: ()/() 111/72  FiO2 (%):  [30 %-40 %] 30 %  Body mass index is 35.37 kg/m².    Intake/Output Summary (Last 24 hours) at 03/13/18 0846  Last data filed at 03/13/18 0453   Gross per 24 hour   Intake           8092.3 ml   Output             1725 ml   Net           6367.3 ml     No intake/output data recorded.     Physical Exam:   General: patient rousable, non purposeful movement, not following commands   Eyes: Normal lids and lashes, no scleral icterus   Neck: supple, normal ROM   Skin: warm and dry, not jaundiced   Cardiovascular: regular rhythm and rate, no murmurs auscultated   Pulm: clear to auscultation bilaterally, regular and unlabored   Abdomen: soft and obese, no reaction to palpation, nondistended; normal bowel sounds   Rectal: deferred   Extremities: no rash or edema   Psychiatric: intubated, on propofol     Results Review:     I reviewed the patient's new clinical results.      Results from last 7 days  Lab Units 03/13/18  0409 03/12/18  1958 03/12/18  0413 03/12/18  0019   WBC 10*3/mm3 16.17*  --  27.27* 17.04*  16.06*   HEMOGLOBIN g/dL 14.3 15.5 16.7 15.3  15.6   HEMATOCRIT % 43.6 46.3 50.2 48.4  49.3   PLATELETS 10*3/mm3 240  --  294 265  256       Results from last 7 days  Lab Units 03/13/18  0409 03/12/18  1811 03/12/18  0941  03/12/18  0527 03/12/18  0019   SODIUM mmol/L 143 136 135* 132* 137   POTASSIUM mmol/L 3.6 4.0 5.1 7.0* 4.3   CHLORIDE mmol/L 105 100 100 96* 94*   CO2 mmol/L 23.6 20.7* 16.2* 17.4* 16.7*   BUN mg/dL 43* 46* 39* 32* 23*   CREATININE mg/dL 1.94* 2.39* 2.36* 2.25* 1.93*   CALCIUM mg/dL 7.7* 8.1* 8.0* 7.7* 8.5*   BILIRUBIN mg/dL 1.0  --   --  1.0 0.6   ALK PHOS U/L 55  --   --  96 99   ALT (SGPT) U/L 71*  --   --  140* 130*   AST (SGOT) U/L 45*  --   --  180* 152*   GLUCOSE mg/dL 140* 274* 498* 544* 552*       Results from last 7 days  Lab Units 03/12/18  0413 03/12/18  0019   INR  1.24* 1.29*  1.30*     No results found for: LIPASE    Radiology:  XR Chest Post CVA Port   Final Result       Right IJ catheter, no pneumothorax is seen, limited by supine   positioning. Endotracheal tube tip is 7.9 cm above the dottie.        This report was finalized on 3/12/2018 4:31 PM by Dr. Pato Terry MD.       Assessment/Plan     Patient Active Problem List   Diagnosis   • Cardiac arrest with ventricular fibrillation       Impression  1.  Diarrhea; stool studies pending, no longer blood tinged, hgb stable  2.  Status post V. fib arrest: associated with polysubstance abuse  3.  Elevated liver enzymes: suspect shock liver, improving.  Hep panel negative  4.  Acute kidney injury: improving modestly    Plan  -follow up stool studies  -daily CMP  -continue supportive care measures and anticipate normalization of liver enzymes  -nutrition when able      I discussed the patients findings and my recommendations with nursing staff.    Jody Burgos MD

## 2018-03-13 NOTE — PLAN OF CARE
Problem: Fall Risk (Adult)  Goal: Identify Related Risk Factors and Signs and Symptoms  Outcome: Ongoing (interventions implemented as appropriate)    Goal: Absence of Fall  Outcome: Ongoing (interventions implemented as appropriate)      Problem: Skin Injury Risk (Adult)  Goal: Identify Related Risk Factors and Signs and Symptoms  Outcome: Ongoing (interventions implemented as appropriate)    Goal: Skin Health and Integrity  Outcome: Ongoing (interventions implemented as appropriate)      Problem: Patient Care Overview  Goal: Plan of Care Review   03/13/18 4710   OTHER   Outcome Summary Patient was extubated this AM and has tolerated being on 2 liters, does appear to have sleep apnea, but has never been diagnosed. Patient OG was removed and has had no vomiting, 500 cc out this am from tube. Patient had a few episodes of diarrhea, cdiff negative. Patient has been on BREANA for a few hours, started on IVF and given and IV bolus to bring up BP. Patient's f/c was removed, has voided a small amount. ST to see patient soon.       Problem: Pain, Acute (Adult)  Goal: Identify Related Risk Factors and Signs and Symptoms  Outcome: Ongoing (interventions implemented as appropriate)    Goal: Acceptable Pain Control/Comfort Level  Outcome: Ongoing (interventions implemented as appropriate)

## 2018-03-13 NOTE — PLAN OF CARE
Problem: Patient Care Overview  Goal: Plan of Care Review  Outcome: Ongoing (interventions implemented as appropriate)   03/13/18 7897   OTHER   Outcome Summary Pt remains on multiple drips. Bicarb stopped this morn after am labs called to MD. Only one bowel movement overnight, and sample sent. Latic rechecked and issue has resolved. HR remains elevated and in afib. Troponin was also elevated, and MD aware.       Goal: Individualization and Mutuality  Outcome: Ongoing (interventions implemented as appropriate)    Goal: Discharge Needs Assessment  Outcome: Ongoing (interventions implemented as appropriate)    Goal: Interprofessional Rounds/Family Conf  Outcome: Ongoing (interventions implemented as appropriate)      Problem: Pain, Acute (Adult)  Goal: Identify Related Risk Factors and Signs and Symptoms  Outcome: Ongoing (interventions implemented as appropriate)    Goal: Acceptable Pain Control/Comfort Level  Outcome: Ongoing (interventions implemented as appropriate)

## 2018-03-14 LAB
ANION GAP SERPL CALCULATED.3IONS-SCNC: 14.4 MMOL/L
BUN BLD-MCNC: 46 MG/DL (ref 6–20)
BUN/CREAT SERPL: 26.1 (ref 7–25)
CALCIUM SPEC-SCNC: 8 MG/DL (ref 8.6–10.5)
CHLORIDE SERPL-SCNC: 107 MMOL/L (ref 98–107)
CO2 SERPL-SCNC: 22.6 MMOL/L (ref 22–29)
CREAT BLD-MCNC: 1.76 MG/DL (ref 0.76–1.27)
DEPRECATED RDW RBC AUTO: 51.7 FL (ref 37–54)
ERYTHROCYTE [DISTWIDTH] IN BLOOD BY AUTOMATED COUNT: 13.8 % (ref 11.5–14.5)
GFR SERPL CREATININE-BSD FRML MDRD: 41 ML/MIN/1.73
GLUCOSE BLD-MCNC: 252 MG/DL (ref 65–99)
GLUCOSE BLDC GLUCOMTR-MCNC: 202 MG/DL (ref 70–130)
GLUCOSE BLDC GLUCOMTR-MCNC: 212 MG/DL (ref 70–130)
GLUCOSE BLDC GLUCOMTR-MCNC: 226 MG/DL (ref 70–130)
GLUCOSE BLDC GLUCOMTR-MCNC: 231 MG/DL (ref 70–130)
GLUCOSE BLDC GLUCOMTR-MCNC: 247 MG/DL (ref 70–130)
GLUCOSE BLDC GLUCOMTR-MCNC: 255 MG/DL (ref 70–130)
HCT VFR BLD AUTO: 38.2 % (ref 40.4–52.2)
HGB BLD-MCNC: 12.4 G/DL (ref 13.7–17.6)
HIV1 P24 AG SER QL: NORMAL
HIV1+2 AB SER QL: NORMAL
LACTOFERRIN STL QL LA: POSITIVE
MAGNESIUM SERPL-MCNC: 2.1 MG/DL (ref 1.6–2.6)
MCH RBC QN AUTO: 32.9 PG (ref 27–32.7)
MCHC RBC AUTO-ENTMCNC: 32.5 G/DL (ref 32.6–36.4)
MCV RBC AUTO: 101.3 FL (ref 79.8–96.2)
PHOSPHATE SERPL-MCNC: 2.9 MG/DL (ref 2.5–4.5)
PLATELET # BLD AUTO: 197 10*3/MM3 (ref 140–500)
PMV BLD AUTO: 10.8 FL (ref 6–12)
POTASSIUM BLD-SCNC: 4.1 MMOL/L (ref 3.5–5.2)
RBC # BLD AUTO: 3.77 10*6/MM3 (ref 4.6–6)
SODIUM BLD-SCNC: 144 MMOL/L (ref 136–145)
WBC NRBC COR # BLD: 13.7 10*3/MM3 (ref 4.5–10.7)

## 2018-03-14 PROCEDURE — 83735 ASSAY OF MAGNESIUM: CPT | Performed by: INTERNAL MEDICINE

## 2018-03-14 PROCEDURE — 92610 EVALUATE SWALLOWING FUNCTION: CPT

## 2018-03-14 PROCEDURE — 25010000002 DIGOXIN PER 500 MCG: Performed by: INTERNAL MEDICINE

## 2018-03-14 PROCEDURE — 25010000002 ENOXAPARIN PER 10 MG: Performed by: INTERNAL MEDICINE

## 2018-03-14 PROCEDURE — 99233 SBSQ HOSP IP/OBS HIGH 50: CPT | Performed by: INTERNAL MEDICINE

## 2018-03-14 PROCEDURE — 99231 SBSQ HOSP IP/OBS SF/LOW 25: CPT | Performed by: INTERNAL MEDICINE

## 2018-03-14 PROCEDURE — 25010000002 PIPERACILLIN SOD-TAZOBACTAM PER 1 G: Performed by: INTERNAL MEDICINE

## 2018-03-14 PROCEDURE — 82962 GLUCOSE BLOOD TEST: CPT

## 2018-03-14 PROCEDURE — 83631 LACTOFERRIN FECAL (QUANT): CPT | Performed by: INTERNAL MEDICINE

## 2018-03-14 PROCEDURE — 63710000001 INSULIN ASPART PER 5 UNITS: Performed by: INTERNAL MEDICINE

## 2018-03-14 PROCEDURE — 80048 BASIC METABOLIC PNL TOTAL CA: CPT | Performed by: INTERNAL MEDICINE

## 2018-03-14 PROCEDURE — 85027 COMPLETE CBC AUTOMATED: CPT | Performed by: INTERNAL MEDICINE

## 2018-03-14 PROCEDURE — 87899 AGENT NOS ASSAY W/OPTIC: CPT | Performed by: INTERNAL MEDICINE

## 2018-03-14 PROCEDURE — G0432 EIA HIV-1/HIV-2 SCREEN: HCPCS | Performed by: INTERNAL MEDICINE

## 2018-03-14 PROCEDURE — 84100 ASSAY OF PHOSPHORUS: CPT | Performed by: INTERNAL MEDICINE

## 2018-03-14 RX ORDER — DIGOXIN 0.25 MG/ML
500 INJECTION INTRAMUSCULAR; INTRAVENOUS ONCE
Status: COMPLETED | OUTPATIENT
Start: 2018-03-14 | End: 2018-03-14

## 2018-03-14 RX ORDER — LOPERAMIDE HYDROCHLORIDE 2 MG/1
2 CAPSULE ORAL 4 TIMES DAILY PRN
Status: DISCONTINUED | OUTPATIENT
Start: 2018-03-14 | End: 2018-03-20 | Stop reason: HOSPADM

## 2018-03-14 RX ORDER — DIGOXIN 0.25 MG/ML
250 INJECTION INTRAMUSCULAR; INTRAVENOUS
Status: COMPLETED | OUTPATIENT
Start: 2018-03-14 | End: 2018-03-14

## 2018-03-14 RX ORDER — METOPROLOL TARTRATE 5 MG/5ML
INJECTION INTRAVENOUS
Status: COMPLETED
Start: 2018-03-14 | End: 2018-03-14

## 2018-03-14 RX ORDER — SODIUM CHLORIDE 9 MG/ML
9 INJECTION, SOLUTION INTRAVENOUS CONTINUOUS
Status: DISCONTINUED | OUTPATIENT
Start: 2018-03-14 | End: 2018-03-20 | Stop reason: HOSPADM

## 2018-03-14 RX ORDER — ACETAMINOPHEN 325 MG/1
650 TABLET ORAL EVERY 6 HOURS PRN
Status: DISCONTINUED | OUTPATIENT
Start: 2018-03-14 | End: 2018-03-20 | Stop reason: HOSPADM

## 2018-03-14 RX ORDER — ASPIRIN 81 MG/1
81 TABLET ORAL DAILY
Status: DISCONTINUED | OUTPATIENT
Start: 2018-03-14 | End: 2018-03-15

## 2018-03-14 RX ADMIN — METOPROLOL TARTRATE 5 MG: 5 INJECTION, SOLUTION INTRAVENOUS at 08:28

## 2018-03-14 RX ADMIN — SODIUM CHLORIDE 5 MG/HR: 900 INJECTION, SOLUTION INTRAVENOUS at 04:42

## 2018-03-14 RX ADMIN — SODIUM CHLORIDE 15 MG/HR: 900 INJECTION, SOLUTION INTRAVENOUS at 14:17

## 2018-03-14 RX ADMIN — INSULIN ASPART 8 UNITS: 100 INJECTION, SOLUTION INTRAVENOUS; SUBCUTANEOUS at 20:04

## 2018-03-14 RX ADMIN — ENOXAPARIN SODIUM 140 MG: 150 INJECTION SUBCUTANEOUS at 20:05

## 2018-03-14 RX ADMIN — SODIUM CHLORIDE 9 ML/HR: 9 INJECTION, SOLUTION INTRAVENOUS at 08:30

## 2018-03-14 RX ADMIN — ASPIRIN 81 MG: 81 TABLET ORAL at 09:37

## 2018-03-14 RX ADMIN — INSULIN ASPART 5 UNITS: 100 INJECTION, SOLUTION INTRAVENOUS; SUBCUTANEOUS at 23:06

## 2018-03-14 RX ADMIN — INSULIN ASPART 5 UNITS: 100 INJECTION, SOLUTION INTRAVENOUS; SUBCUTANEOUS at 16:39

## 2018-03-14 RX ADMIN — METOPROLOL TARTRATE 25 MG: 25 TABLET ORAL at 09:37

## 2018-03-14 RX ADMIN — TAZOBACTAM SODIUM AND PIPERACILLIN SODIUM 3.38 G: 375; 3 INJECTION, SOLUTION INTRAVENOUS at 18:13

## 2018-03-14 RX ADMIN — INSULIN ASPART 5 UNITS: 100 INJECTION, SOLUTION INTRAVENOUS; SUBCUTANEOUS at 11:44

## 2018-03-14 RX ADMIN — METOPROLOL TARTRATE 25 MG: 25 TABLET ORAL at 21:34

## 2018-03-14 RX ADMIN — METOPROLOL TARTRATE 5 MG: 5 INJECTION, SOLUTION INTRAVENOUS at 08:07

## 2018-03-14 RX ADMIN — PANTOPRAZOLE SODIUM 40 MG: 40 INJECTION, POWDER, FOR SOLUTION INTRAVENOUS at 06:33

## 2018-03-14 RX ADMIN — DIGOXIN 250 MCG: 0.25 INJECTION INTRAMUSCULAR; INTRAVENOUS at 23:00

## 2018-03-14 RX ADMIN — DIGOXIN 250 MCG: 0.25 INJECTION INTRAMUSCULAR; INTRAVENOUS at 21:34

## 2018-03-14 RX ADMIN — ENOXAPARIN SODIUM 140 MG: 150 INJECTION SUBCUTANEOUS at 09:38

## 2018-03-14 RX ADMIN — DEXMEDETOMIDINE HYDROCHLORIDE 0.3 MCG/KG/HR: 4 INJECTION, SOLUTION INTRAVENOUS at 01:11

## 2018-03-14 RX ADMIN — TAZOBACTAM SODIUM AND PIPERACILLIN SODIUM 3.38 G: 375; 3 INJECTION, SOLUTION INTRAVENOUS at 09:41

## 2018-03-14 RX ADMIN — DEXMEDETOMIDINE HYDROCHLORIDE 0.4 MCG/KG/HR: 4 INJECTION, SOLUTION INTRAVENOUS at 03:49

## 2018-03-14 RX ADMIN — TAZOBACTAM SODIUM AND PIPERACILLIN SODIUM 3.38 G: 375; 3 INJECTION, SOLUTION INTRAVENOUS at 01:08

## 2018-03-14 RX ADMIN — DIGOXIN 500 MCG: 0.25 INJECTION INTRAMUSCULAR; INTRAVENOUS at 19:50

## 2018-03-14 RX ADMIN — INSULIN ASPART 5 UNITS: 100 INJECTION, SOLUTION INTRAVENOUS; SUBCUTANEOUS at 08:13

## 2018-03-14 RX ADMIN — INSULIN ASPART 5 UNITS: 100 INJECTION, SOLUTION INTRAVENOUS; SUBCUTANEOUS at 04:54

## 2018-03-14 RX ADMIN — METOPROLOL TARTRATE 5 MG: 5 INJECTION, SOLUTION INTRAVENOUS at 07:49

## 2018-03-14 RX ADMIN — ACETAMINOPHEN 650 MG: 325 TABLET ORAL at 11:35

## 2018-03-14 RX ADMIN — LOPERAMIDE HYDROCHLORIDE 2 MG: 2 CAPSULE ORAL at 10:21

## 2018-03-14 NOTE — PLAN OF CARE
Problem: Fall Risk (Adult)  Goal: Identify Related Risk Factors and Signs and Symptoms  Outcome: Ongoing (interventions implemented as appropriate)    Goal: Absence of Fall  Outcome: Outcome(s) achieved Date Met: 03/14/18      Problem: Skin Injury Risk (Adult)  Goal: Identify Related Risk Factors and Signs and Symptoms  Outcome: Ongoing (interventions implemented as appropriate)    Goal: Skin Health and Integrity  Outcome: Ongoing (interventions implemented as appropriate)      Problem: Patient Care Overview  Goal: Plan of Care Review   03/14/18 7856   OTHER   Outcome Summary Patient HR was elevated this AM, 3 doses of 5 mg of lopressor was ordered, plus 25 mg PO. Patient remains on the cardizem gtt. Once HR was better under control, patient was able to get up to the chair. Heart cath scheduled for 8 AM tomorrow. Patient still having diarrhea, stool samples send off.       Problem: Pain, Acute (Adult)  Goal: Identify Related Risk Factors and Signs and Symptoms  Outcome: Ongoing (interventions implemented as appropriate)    Goal: Acceptable Pain Control/Comfort Level  Outcome: Ongoing (interventions implemented as appropriate)

## 2018-03-14 NOTE — PROGRESS NOTES
"   LOS: 2 days    Patient Care Team:  No Known Provider as PCP - General    Chief Complaint:    Chief Complaint   Patient presents with   • Cardiac Arrest     Follow UP SUHAIL  Subjective     Interval History:   Follow up SUHAIL .  Off pressors since 5 am. On cardizem drip.  Afib with RVR.  Lopressor IV given.     Review of Systems:   Thirsty, throat sore. Chest sore with movement. Loose stools. Slept poorly. Urinating without honeycutt.     Objective     Vital Signs  Temp:  [97.7 °F (36.5 °C)-99.4 °F (37.4 °C)] 98.8 °F (37.1 °C)  Heart Rate:  [] 105  BP: ()/() 115/91  FiO2 (%):  [30 %] 30 %    Flowsheet Rows    Flowsheet Row First Filed Value   Admission Height 200.7 cm (79\") Documented at 03/11/2018 2344   Admission Weight (!)  141 kg (310 lb) ['s license] Documented at 03/11/2018 2344          No intake/output data recorded.  I/O last 3 completed shifts:  In: 7442.9 [I.V.:6707.9; IV Piggyback:735]  Out: 3225 [Urine:2725; Emesis/NG output:500]    Intake/Output Summary (Last 24 hours) at 03/14/18 0836  Last data filed at 03/14/18 0617   Gross per 24 hour   Intake          3256.93 ml   Output             1975 ml   Net          1281.93 ml       Physical Exam:  Awake, alert. Oral mucosa dry. Heart Irreg, irreg, tachy. No s3 or rub.  Lungs clear to auscultation. Abd +bs soft, nontender. Ext 1+ lower ext edema.       Results Review:      Results from last 7 days  Lab Units 03/14/18  0451 03/13/18  0409 03/12/18  1811  03/12/18  0527 03/12/18  0019   SODIUM mmol/L 144 143 136  < > 132* 137   POTASSIUM mmol/L 4.1 3.6 4.0  < > 7.0* 4.3   CHLORIDE mmol/L 107 105 100  < > 96* 94*   CO2 mmol/L 22.6 23.6 20.7*  < > 17.4* 16.7*   BUN mg/dL 46* 43* 46*  < > 32* 23*   CREATININE mg/dL 1.76* 1.94* 2.39*  < > 2.25* 1.93*   CALCIUM mg/dL 8.0* 7.7* 8.1*  < > 7.7* 8.5*   BILIRUBIN mg/dL  --  1.0  --   --  1.0 0.6   ALK PHOS U/L  --  55  --   --  96 99   ALT (SGPT) U/L  --  71*  --   --  140* 130*   AST (SGOT) U/L  --  " 45*  --   --  180* 152*   GLUCOSE mg/dL 252* 140* 274*  < > 544* 552*   < > = values in this interval not displayed.    Estimated Creatinine Clearance: 79.4 mL/min (by C-G formula based on SCr of 1.76 mg/dL (H)).      Results from last 7 days  Lab Units 03/14/18  0451 03/13/18  0409 03/12/18  1811  03/12/18  0019   MAGNESIUM mg/dL 2.1 2.1  --   --  2.9*   PHOSPHORUS mg/dL 2.9 4.3 3.3  < > 9.0*   < > = values in this interval not displayed.      Results from last 7 days  Lab Units 03/13/18 0409   URIC ACID mg/dL 6.0         Results from last 7 days  Lab Units 03/14/18  0451 03/13/18  0409 03/12/18  1958 03/12/18 0413 03/12/18  0019   WBC 10*3/mm3 13.70* 16.17*  --  27.27* 17.04*  16.06*   HEMOGLOBIN g/dL 12.4* 14.3 15.5 16.7 15.3  15.6   PLATELETS 10*3/mm3 197 240  --  294 265  256         Results from last 7 days  Lab Units 03/12/18 0413 03/12/18  0019   INR  1.24* 1.29*  1.30*         Imaging Results (last 24 hours)     ** No results found for the last 24 hours. **          aspirin 81 mg Oral Daily   enoxaparin 1 mg/kg Subcutaneous Q12H   insulin aspart 0-14 Units Subcutaneous Q4H   metoprolol tartrate 5 mg Intravenous Q5 Min   metoprolol tartrate 25 mg Oral Q12H   pantoprazole 40 mg Intravenous QAM AC   piperacillin-tazobactam 3.375 g Intravenous Q8H       dexmedetomidine 0.2-1.5 mcg/kg/hr Last Rate: 0.3 mcg/kg/hr (03/14/18 0456)   diltiaZEM 5-15 mg/hr Last Rate: 15 mg/hr (03/14/18 7866)   phenylephrine 0.5-3 mcg/kg/min Last Rate: Stopped (03/14/18 0287)   sodium chloride  Last Rate: 1,000 mL/hr (03/11/18 0737)   sodium chloride 9 mL/hr Last Rate: 9 mL/hr (03/14/18 0830)       Medication Review:   Current Facility-Administered Medications   Medication Dose Route Frequency Provider Last Rate Last Dose   • amiodarone (CORDARONE) injection   Intravenous Code / Trauma / Sedation Medication Jimmy Rodrigez MD   300 mg at 03/11/18 1223   • aspirin EC tablet 81 mg  81 mg Oral Daily Valentni Moya MD       •  dexmedetomidine (PRECEDEX) infusion in NaCl 200 mcg/50 mL  0.2-1.5 mcg/kg/hr Intravenous Titrated Linda Blank MD 10.65 mL/hr at 03/14/18 0456 0.3 mcg/kg/hr at 03/14/18 0456   • dextrose (D50W) solution 25 g  25 g Intravenous Q15 Min PRN Linda Blank MD       • dextrose (GLUTOSE) oral gel 15 g  15 g Oral Q15 Min PRN Linda Blank MD       • diltiaZEM (CARDIZEM) 100 mg/100 mL (1 mg/mL) 0.9% NS  5-15 mg/hr Intravenous Titrated Valentin Moya MD 15 mL/hr at 03/14/18 0747 15 mg/hr at 03/14/18 0747   • enoxaparin (LOVENOX) injection 140 mg  1 mg/kg Subcutaneous Q12H Valentin Moya MD       • glucagon (human recombinant) (GLUCAGEN DIAGNOSTIC) injection 1 mg  1 mg Subcutaneous PRN Linda Blank MD       • insulin aspart (novoLOG) injection 0-14 Units  0-14 Units Subcutaneous Q4H Linda Blank MD   5 Units at 03/14/18 0813   • magnesium sulfate injection    Code / Trauma / Sedation Medication Jimmy Rodrigez MD   2 g at 03/11/18 2334   • metoprolol tartrate (LOPRESSOR) injection 5 mg  5 mg Intravenous Q5 Min Valentin Moya MD   5 mg at 03/14/18 0828   • metoprolol tartrate (LOPRESSOR) tablet 25 mg  25 mg Oral Q12H Valentin Moya MD       • ondansetron (ZOFRAN) injection 4 mg  4 mg Intravenous Q4H PRN Howard Hurt MD   4 mg at 03/12/18 0209   • pantoprazole (PROTONIX) injection 40 mg  40 mg Intravenous QAM AC Linda Blank MD   40 mg at 03/14/18 0633   • phenylephrine (BREANA-SYNEPHRINE) 50 mg/250 mL (0.2 mg/mL) in 0.9% NS  infusion  0.5-3 mcg/kg/min Intravenous Titrated Valentin Moya MD   Stopped at 03/14/18 0505   • piperacillin-tazobactam (ZOSYN) 3.375 g in iso-osmotic dextrose 50 ml (premix)  3.375 g Intravenous Q8H Linda Blank MD   3.375 g at 03/14/18 0108   • sodium chloride 0.9 % flush 10 mL  10 mL Intravenous PRN Jimmy Rodrigez MD       • sodium chloride 0.9 % infusion   Intravenous Code / Trauma / Sedation Continuous Med Jimmy Rodrigez MD 1,000 mL/hr at 03/11/18 2351 1,000 mL/hr at  03/11/18 2351   • sodium chloride 0.9 % infusion  9 mL/hr Intravenous Continuous Linda Blank MD 9 mL/hr at 03/14/18 0830 9 mL/hr at 03/14/18 0830       Assessment/Plan   1. . Non-oliguric SUHAIL  associated with  cardiac arrest.  No baseline renal function available at this time. Cr is better. Adequate uop .  Off pressors.    2.  SP Vfib arrest , resuscitated.  3.  Polysubstance abuse  4.  DM2 SSI. Swallow study today.   5.  Atrial fibrillation with RVR.  Beta blockers being managed by cardiology.  On cardizem drip.   6. Metabolic acidosis,  Improved  7. Diarrhea being evaluated by GI.         Rachael Olivo MD  03/14/18  8:36 AM

## 2018-03-14 NOTE — THERAPY EVALUATION
Acute Care - Speech Language Pathology   Swallow Initial Evaluation [facility]     Patient Name: Shannan Arguelles  : 1965  MRN: [id number]  Today's Date: 3/14/2018               Admit Date: 3/11/2018  SPEECH-LANGUAGE PATHOLOGY EVALUATION - SWALLOW  Subjective: The patient was seen on this date for a Clinical Swallow evaluation.  Patient was alert and cooperative.    The patient's history is significant for cardiac arrest with ventricular fibrillation, Acute respiratory failure, intubation 3/11-3/13/18.  Objective: Textures given included thin liquid, puree consistency, mechanical soft consistency and regular consistency.  Assessment: Difficulties were noted with thin liquid, characterized by throat clear X1 with thins via straw.  SLP Findings:  Patient presents with minimal oropharyngeal dysphagia, without esophageal component.   Recommendations: Diet Textures: thin liquid, regular consistency food.  Medications should be taken whole with thin or puree.   Recommended Strategies: Upright for PO, small bites and sips and no straw. Oral care before breakfast, after all meals and PRN.  Other Recommended Evaluations:     Dysphagia therapy is recommended. Rationale: Tolerate least restrictive diet.    Visit Dx:     ICD-10-CM ICD-9-CM   1. Cardiac arrest with ventricular fibrillation I46.9 427.5    I49.01 427.41   2. Atrial fibrillation, unspecified type I48.91 427.31   3. Altered mental status, unspecified altered mental status type R41.82 780.97   4. Lactic acidosis E87.2 276.2   5. Substance abuse F19.10 305.90     Patient Active Problem List   Diagnosis   • Cardiac arrest with ventricular fibrillation     Past Medical History:   Diagnosis Date   • Diabetes mellitus    • Hypertension    • Pneumonia      History reviewed. No pertinent surgical history.       SWALLOW EVALUATION (last 72 hours)      SLP Adult Swallow Evaluation     Row Name 18 0915                   Rehab Evaluation    Document Type  evaluation  -OC        Subjective Information no complaints  -OC        Patient Observations alert;cooperative  -OC        Patient Effort good  -OC        Symptoms Noted During/After Treatment none  -OC           General Information    Patient Profile Reviewed yes  -OC        Prior Level of Function-Communication WFL  -OC        Prior Level of Function-Swallowing no diet consistency restrictions  -OC        Plans/Goals Discussed with patient and family;agreed upon  -OC        Barriers to Rehab none identified  -OC        Patient's Goals for Discharge return to PO diet  -OC        Family Goals for Discharge patient able to return to PO diet  -OC           Pain Assessment    Additional Documentation Pain Scale: Numbers Pre/Post-Treatment (Group)  -OC           Pain Scale: Numbers Pre/Post-Treatment    Pain Scale: Numbers, Pretreatment 0/10 - no pain  -OC        Pain Scale: Numbers, Post-Treatment 0/10 - no pain  -OC           Oral Motor and Function    Dentition Assessment natural, present and adequate  -OC        Secretion Management WNL/WFL  -OC        Mucosal Quality moist, healthy  -OC        Volitional Swallow WFL  -OC        Volitional Cough WFL  -OC           Oral Musculature and Cranial Nerve Assessment    Oral Motor General Assessment WFL  -OC           Clinical Swallow Eval    Oral Prep Phase WFL  -OC           Clinical Impression    SLP Swallowing Diagnosis functional oral phase;functional pharyngeal phase  -OC        Rehab Potential/Prognosis, Swallowing good, to achieve stated therapy goals  -OC        Criteria for Skilled Therapeutic Interventions Met demonstrates skilled criteria  -OC           Recommendations    Therapy Frequency (SLP) PRN  -OC        Predicted Duration Therapy Intervention (Days) until discharge  -OC        SLP Diet Recommendation regular textures;thin liquids  -OC        Recommended Precautions and Strategies no straw  -OC        SLP Rec. for Method of Medication Administration meds  whole;with thin liquids;with pudding or applesauce  -OC        Monitor for Signs of Aspiration yes;cough;elevated WBC count;right lower lobe infiltrates;pneumonia  -OC        Anticipated Dischage Disposition unknown  -OC           Swallow Goals (SLP)    Oral Nutrition/Hydration Goal Selection (SLP) oral nutrition/hydration, SLP goal 1  -OC           Oral Nutrition/Hydration Goal 1 (SLP)    Oral Nutrition/Hydration Goal 1, SLP Pt will tolerate PO without overt s/s aspiration.   -OC          User Key  (r) = Recorded By, (t) = Taken By, (c) = Cosigned By    Initials Name Effective Dates    OC Missy Resendiz MA,CCC-SLP 04/05/17 -         EDUCATION  The patient has been educated in the following areas:   Dysphagia (Swallowing Impairment).    SLP Recommendation and Plan  SLP Swallowing Diagnosis: functional oral phase, functional pharyngeal phase  SLP Diet Recommendation: regular textures, thin liquids  Recommended Precautions and Strategies: no straw     Monitor for Signs of Aspiration: yes, cough, elevated WBC count, right lower lobe infiltrates, pneumonia     Criteria for Skilled Therapeutic Interventions Met: demonstrates skilled criteria  Anticipated Dischage Disposition: unknown  Rehab Potential/Prognosis, Swallowing: good, to achieve stated therapy goals  Therapy Frequency (SLP): PRN  Predicted Duration Therapy Intervention (Days): until discharge       Plan of Care Reviewed With: patient, family  Plan of Care Review  Plan of Care Reviewed With: patient, family  Outcome Summary: Bedside swallow eval completed. Recommend regular with thins, no straws. ST to follow for diet tolerance.           SLP GOALS     Row Name 03/14/18 0915             Oral Nutrition/Hydration Goal 1 (SLP)    Oral Nutrition/Hydration Goal 1, SLP Pt will tolerate PO without overt s/s aspiration.   -OC        User Key  (r) = Recorded By, (t) = Taken By, (c) = Cosigned By    Initials Name Provider Type    GABRIEL Resendiz MA,CCC-SLP Speech and  Language Pathologist             SLP Outcome Measures (last 72 hours)      SLP Outcome Measures     Row Name 03/14/18 0930             SLP Outcome Measures    Outcome Measure Used? Adult NOMS  -OC         FCM Scores    FCM Chosen Swallowing  -OC      Swallowing FCM Score 6  -OC        User Key  (r) = Recorded By, (t) = Taken By, (c) = Cosigned By    Initials Name Effective Dates    GABRIEL Resendiz MA,DEBORA-SLP 04/05/17 -            Time Calculation:         Time Calculation- SLP     Row Name 03/14/18 1316             Time Calculation- SLP    SLP Start Time 0845  -OC      SLP Stop Time 0930  -OC      SLP Time Calculation (min) 45 min  -OC      SLP Received On 03/14/18  -OC        User Key  (r) = Recorded By, (t) = Taken By, (c) = Cosigned By    Initials Name Provider Type    OC Missy Resendiz MA,DEBORA-SLP Speech and Language Pathologist          Therapy Charges for Today     Code Description Service Date Service Provider Modifiers Qty    [id number] HC ST EVAL ORAL PHARYNG SWALLOW 3 3/14/2018 Missy Resendiz MA,DEBORA-SLP GN 1               Missy Resendiz MA,DEBORA-SLP  3/14/2018

## 2018-03-14 NOTE — CONSULTS
Adult Nutrition  Assessment/PES    Patient Name:  Shannan Arguelles  YOB: 1965  MRN: [id number]  Admit Date:  3/11/2018    Assessment Date:  3/14/2018    Comments:  Pt extubated and diet avanced.  Will honor food pref's.          Adult Nutrition Assessment     Row Name 03/14/18 1331       Nutrition Prescription PO    Current PO Diet Regular    Common Modifiers Cardiac;Consistent Carbohydrate       Propofol Considerations    Propofol (mL/hr) 0 mL/hr    Row Name 03/14/18 1333       Labs/Procedures/Meds    Lab Results Reviewed reviewed       Physical Findings    Overall Physical Appearance --   extubated    Skin --   intact    Row Name 03/14/18 133       Reason for Assessment    Reason For Assessment follow-up protocol    Diagnosis --   extubated          Problem/Interventions:                  Intervention Goal     Row Name 03/14/18 6995       Intervention Goal    General Maintain nutrition    PO Tolerate PO;PO intake (%)    PO Intake % 80 %    Weight No significant weight loss            Nutrition Intervention     Row Name 03/14/18 5287       Nutrition Intervention    RD/Tech Action Interview for preference;Follow Tx progress;Care plan reviewd              Education/Evaluation     Row Name 03/14/18 5206       Education    Education Will Instruct as appropriate       Monitor/Evaluation    Monitor Per protocol        Electronically signed by:  Christine Tobar RD  03/14/18 1:37 PM

## 2018-03-14 NOTE — PROGRESS NOTES
[facility]  Inpatient Progress Note    Reason for Follow Up:  Blood tinged diarrhea, elevated LFTs    Subjective     Interval History: Discussed with patient, discussed with RN.  Continues to have diarrheal stools despite no by mouth intake for the past 4 days.  He denies any issues prior to this.  Distant history of irritable bowel syndrome.  No complaints of abdominal pain.  No exposure history or recent antibiotics per patient account.      Current Facility-Administered Medications:   •  amiodarone (CORDARONE) injection, , Intravenous, Code / Trauma / Sedation Medication, Jimmy Rodrigez MD, 300 mg at 03/11/18 2327  •  aspirin EC tablet 81 mg, 81 mg, Oral, Daily, Valentin Moya MD  •  dexmedetomidine (PRECEDEX) infusion in NaCl 200 mcg/50 mL, 0.2-1.5 mcg/kg/hr, Intravenous, Titrated, Linda Blank MD, Last Rate: 10.65 mL/hr at 03/14/18 0456, 0.3 mcg/kg/hr at 03/14/18 0456  •  dextrose (D50W) solution 25 g, 25 g, Intravenous, Q15 Min PRN, Linda Blank MD  •  dextrose (GLUTOSE) oral gel 15 g, 15 g, Oral, Q15 Min PRN, Linda Blank MD  •  diltiaZEM (CARDIZEM) 100 mg/100 mL (1 mg/mL) 0.9% NS, 5-15 mg/hr, Intravenous, Titrated, Valentin Moya MD, Last Rate: 15 mL/hr at 03/14/18 0747, 15 mg/hr at 03/14/18 0747  •  enoxaparin (LOVENOX) injection 140 mg, 1 mg/kg, Subcutaneous, Q12H, Valentin Moya MD  •  glucagon (human recombinant) (GLUCAGEN DIAGNOSTIC) injection 1 mg, 1 mg, Subcutaneous, PRN, Linda Blank MD  •  insulin aspart (novoLOG) injection 0-14 Units, 0-14 Units, Subcutaneous, Q4H, Linda Blank MD, 5 Units at 03/14/18 0813  •  magnesium sulfate injection, , , Code / Trauma / Sedation Medication, Jimmy Rodrigez MD, 2 g at 03/11/18 2334  •  metoprolol tartrate (LOPRESSOR) injection 5 mg, 5 mg, Intravenous, Q5 Min, Valentin oMya MD, 5 mg at 03/14/18 0828  •  metoprolol tartrate (LOPRESSOR) tablet 25 mg, 25 mg, Oral, Q12H, Valentin Moya MD  •  ondansetron (ZOFRAN)  injection 4 mg, 4 mg, Intravenous, Q4H PRN, Howard Hurt MD, 4 mg at 03/12/18 0209  •  pantoprazole (PROTONIX) injection 40 mg, 40 mg, Intravenous, QAM AC, Linda Blank MD, 40 mg at 03/14/18 0633  •  phenylephrine (BREANA-SYNEPHRINE) 50 mg/250 mL (0.2 mg/mL) in 0.9% NS  infusion, 0.5-3 mcg/kg/min, Intravenous, Titrated, Valentin Moya MD, Stopped at 03/14/18 0505  •  piperacillin-tazobactam (ZOSYN) 3.375 g in iso-osmotic dextrose 50 ml (premix), 3.375 g, Intravenous, Q8H, Linda Blank MD, 3.375 g at 03/14/18 0108  •  Insert peripheral IV, , , Once **AND** sodium chloride 0.9 % flush 10 mL, 10 mL, Intravenous, PRN, Jimmy Rodrigez MD  •  sodium chloride 0.9 % infusion, , Intravenous, Code / Trauma / Sedation Continuous Med, Jimmy Rodrigez MD, Last Rate: 1,000 mL/hr at 03/11/18 2351, 1,000 mL/hr at 03/11/18 2351  •  sodium chloride 0.9 % infusion, 9 mL/hr, Intravenous, Continuous, Linda Blank MD, Last Rate: 9 mL/hr at 03/14/18 0830, 9 mL/hr at 03/14/18 0830  Review of Systems:    All systems were reviewed and negative except for:  Gastrointestinal: postitive for  See HPI    Objective     Vital Signs  Temp:  [97.7 °F (36.5 °C)-99.4 °F (37.4 °C)] 98.8 °F (37.1 °C)  Heart Rate:  [] 105  BP: ()/() 115/91  FiO2 (%):  [30 %] 30 %  Body mass index is 35.37 kg/m².    Intake/Output Summary (Last 24 hours) at 03/14/18 0840  Last data filed at 03/14/18 0617   Gross per 24 hour   Intake          3256.93 ml   Output             1975 ml   Net          1281.93 ml     No intake/output data recorded.     Physical Exam:   General: patient awake, alert and cooperative   Eyes: Normal lids and lashes, no scleral icterus   Neck: supple, normal ROM   Skin: warm and dry, not jaundiced   Cardiovascular: regular rhythm and rate, no murmurs auscultated   Pulm: clear to auscultation bilaterally, regular and unlabored   Abdomen: soft, nontender, nondistended; normal bowel sounds   Rectal: deferred   Extremities: no  rash or edema   Psychiatric: Normal mood and behavior; memory intact     Results Review:     I reviewed the patient's new clinical results.      Results from last 7 days  Lab Units 03/14/18 0451 03/13/18 0409 03/12/18 1958 03/12/18  0413   WBC 10*3/mm3 13.70* 16.17*  --  27.27*   HEMOGLOBIN g/dL 12.4* 14.3 15.5 16.7   HEMATOCRIT % 38.2* 43.6 46.3 50.2   PLATELETS 10*3/mm3 197 240  --  294       Results from last 7 days  Lab Units 03/14/18  0451 03/13/18  0409 03/12/18 1811 03/12/18  0527 03/12/18  0019   SODIUM mmol/L 144 143 136  < > 132* 137   POTASSIUM mmol/L 4.1 3.6 4.0  < > 7.0* 4.3   CHLORIDE mmol/L 107 105 100  < > 96* 94*   CO2 mmol/L 22.6 23.6 20.7*  < > 17.4* 16.7*   BUN mg/dL 46* 43* 46*  < > 32* 23*   CREATININE mg/dL 1.76* 1.94* 2.39*  < > 2.25* 1.93*   CALCIUM mg/dL 8.0* 7.7* 8.1*  < > 7.7* 8.5*   BILIRUBIN mg/dL  --  1.0  --   --  1.0 0.6   ALK PHOS U/L  --  55  --   --  96 99   ALT (SGPT) U/L  --  71*  --   --  140* 130*   AST (SGOT) U/L  --  45*  --   --  180* 152*   GLUCOSE mg/dL 252* 140* 274*  < > 544* 552*   < > = values in this interval not displayed.    Results from last 7 days  Lab Units 03/12/18 0413 03/12/18  0019   INR  1.24* 1.29*  1.30*     No results found for: LIPASE    Radiology:  XR Chest Post CVA Port   Final Result       Right IJ catheter, no pneumothorax is seen, limited by supine   positioning. Endotracheal tube tip is 7.9 cm above the dottie.        This report was finalized on 3/12/2018 4:31 PM by Dr. Pato Terry MD.          XR Abdomen KUB   Final Result   NG tube as above        This report was finalized on 3/12/2018 4:24 AM by Roberto Carlos Yates MD.          XR Abdomen KUB   Final Result   Nasogastric tube as above        This report was finalized on 3/12/2018 3:07 AM by Roberto Carlos Yates MD.          CT Head Without Contrast   Final Result   1. No acute intracranial abnormality.                           This study was performed with techniques to keep radiation  doses as low   as reasonably achievable (ALARA). Individualized dose reduction   techniques using automated exposure control or adjustment of mA and/or   kV according to the patient size were employed.        This report was finalized on 3/12/2018 1:17 AM by Roberto Carlos Yates MD.          XR Chest 1 View   Final Result   Patient intubated, findings of mild CHF with right effusion   noted           This report was finalized on 3/12/2018 12:21 AM by Roberto Carlos Yates MD.              Assessment/Plan     Patient Active Problem List   Diagnosis   • Cardiac arrest with ventricular fibrillation     Impression  #1 blood-tinged diarrhea: No longer reported blood-tinged, diarrhea persist.  #2 elevated LFTs  #3 status post V. fib arrest: Associated with polysubstance abuse      Recommendation  #1 Await outstanding stool studies, include fecal leukocytes  #2 advance diet once cleared by speech pathology  #3 consider antidiarrheal if symptoms persist  #4 eventual GI evaluation when acute issues have resolved  I discussed the patients findings and my recommendations with patient and nursing staff.    Leonard Kohli MD

## 2018-03-14 NOTE — CONSULTS
Electrophysiology Hospital Consult            Patient Name: Shannna Arguelles  Age/Sex: 52 y.o. male  : 1965  MRN: [id number]    Date of Admission: 3/11/2018  Date of Encounter Visit: 18  Encounter Provider: Cate Menjivar RN  Referring Provider: Howard Hurt MD  Place of Service: [facility] CARDIOLOGY  Patient Care Team:  No Known Provider as PCP - General      Subjective:   EP Consultation for: Cardiac arrest, EF 15%    Chief Complaint: Cardiac arrest    History of Present Illness:  Shannan Arguelles is a 52 y.o. male with a history of HTN, DM, cocaine abuse (3 times a week). He presented to the ED on 3/11/18 d/t confusion and found to be in cardiac arrest, Vfib and was shocked 3 times and is now in Afib. He has since been extubated.     We have been consulted for post cardiac arrest with an EF of 15% and persistent Afib. Labs today showed BUN/creat 27/1.28, WBC 12.16. EKG done 3/13/18 shows Afib with a rate of 93. Echo done 3/12/18 days ago showed an EF of 14% with multiple wall segments showing hypokinesis and an LVDD of 6.4cm. ARIA to be done today. His heart rate has been Afib with RVR rates up to 130's. His IV diltiazem drip is off and heart rate is 90's to 120's. He received lopressor 10mg IV this morning. He is anticoagulated on lovenox.         Cardiac Testing:  ARIA done today pending    Right and left heart cath 3/15/18  RIGHT HEART CATHETERIZATION:  Pressures:  Right Atrial:                     6  Right Ventricle   :            34/6  Pulmonary Artery:           34/14 mean 23  PCWP:                            10  Cardiac output                3.10  Cardiac index                 1.12     LEFT HEART CATHETERIZATION:  LEFT VENTRICULOGRAPHY: The LV pressure was 110/8 .  There was left ventricular dilatation severely reduced LV function EF is about probably 30% but it's of suboptimal LV gram he does have some calcium in his proximal LAD .  There was no mitral  insufficiency or gradient across the aortic valve on pullback.     CORONARY ANGIOGRAPHY:  Left main: Normal  Left anterior descending: Normal proximal mid and distally normal diagonals  Ramus intermedius:Not present  Circumflex: 10% luminal irregularities in the proximal portion otherwise normal   RCA: Is a dominant vessel.  20-30% luminal irregularities proximally mid and distal are normal     SUMMARY: Severe cardiomyopathy of nonischemic normal right heart pressures low cardiac index mild nonobstructive coronary disease    Echo 3/12/18  · Left ventricular systolic function is severely decreased. Estimated EF = 14%.  · The left ventricular cavity is moderately dilated.  · Left ventricular wall thickness is consistent with mild-to-moderate concentric hypertrophy.  · Moderately reduced right ventricular systolic function noted.  · Left atrial cavity size is mildly dilated.  · The following left ventricular wall segments are hypokinetic: mid anterior, apical anterior, basal anterolateral, mid anterolateral, apical lateral, basal inferolateral, mid inferolateral, apical inferior, mid inferior, apical septal, basal inferoseptal, mid inferoseptal, apex hypokinetic, mid anteroseptal, basal anterior, basal inferior and basal inferoseptal.        Long discussion about treatment but he remembers little         Past Medical History:  Past Medical History:   Diagnosis Date   • Diabetes mellitus    • Hypertension    • Pneumonia        History reviewed. No pertinent surgical history.    Home Medications:   Prescriptions Prior to Admission   Medication Sig Dispense Refill Last Dose   • atorvastatin (LIPITOR) 40 MG tablet Take 40 mg by mouth Daily.      • insulin aspart (novoLOG) 100 UNIT/ML injection Inject  under the skin 3 (Three) Times a Day Before Meals. Home sliding scale: 1 unit for every 10 grams of carbs      • insulin glargine (LANTUS) 100 UNIT/ML injection Inject 29 Units under the skin Every Night.      • lisinopril  (PRINIVIL,ZESTRIL) 10 MG tablet Take 10 mg by mouth Daily.          Allergies:  Allergies   Allergen Reactions   • Penicillins Rash       Past Social History:  Social History     Social History   • Marital status: Single     Spouse name: N/A   • Number of children: N/A   • Years of education: N/A     Occupational History   • Not on file.     Social History Main Topics   • Smoking status: Never Smoker   • Smokeless tobacco: Never Used   • Alcohol use Not on file   • Drug use:      Frequency: 3.0 times per week     Types: Cocaine      Comment: used today   • Sexual activity: Not on file     Other Topics Concern   • Not on file     Social History Narrative   • No narrative on file       Past Family History:  History reviewed. No pertinent family history.    Review of Systems: All systems reviewed. Pertinent positives identified in HPI. All other systems are negative.     14 point ROS was performed and is negative except as outlined in HPI.     Objective:     Objective:        ICU Vital Signs     Row Name 03/14/18 1116 03/14/18 1104 03/14/18 1024 03/14/18 1004 03/14/18 0932       Vitals    Pulse 92  -- 106  -- 102    BP  -- 128/98  -- 137/80  --    Noninvasive MAP (mmHg)  -- 113  -- 101  --       Oxygen Therapy    SpO2 92 %  --  --  -- 91 %    Row Name 03/14/18 0904 03/14/18 0815 03/14/18 0803 03/14/18 0800 03/14/18 0741       Vitals    Pulse  -- 105  --  -- (!)  122    BP (!)  147/106  -- 115/91  --  --    Noninvasive MAP (mmHg) 125  -- 101  --  --       Oxygen Therapy    SpO2  -- 92 %  --  -- 94 %    Device (Oxygen Therapy)  --  --  -- room air  --    Row Name 03/14/18 0734 03/14/18 0722 03/14/18 0719 03/14/18 0715 03/14/18 0703       Vitals    Temp  --  -- 98.8 °F (37.1 °C)  --  --    Temp src  --  -- Oral  --  --    Pulse  -- 106  -- 110  --    /93  -- (!)  134/101  -- 117/84    Noninvasive MAP (mmHg) 98  -- 113  -- 93       Oxygen Therapy    SpO2  -- 94 %  -- 93 %  --    Row Name 03/14/18 0700 03/14/18 0648  03/14/18 0645 03/14/18 0633 03/14/18 0630       Vitals    Pulse 98  -- 88  -- 96    BP  -- 104/92  -- 113/83  --    Noninvasive MAP (mmHg)  -- 97  -- 96  --       Oxygen Therapy    SpO2 92 %  -- 93 %  -- 92 %    Row Name 03/14/18 0619 03/14/18 0606 03/14/18 0604 03/14/18 0557 03/14/18 0548       Vitals    Pulse  -- 79  -- 84  --    /77  -- 103/59  -- 106/61    Noninvasive MAP (mmHg) 94  -- 68  -- 77       Oxygen Therapy    SpO2  -- 92 %  -- 91 %  --    Row Name 03/14/18 0545 03/14/18 0533 03/14/18 0530 03/14/18 0518 03/14/18 0515       Vitals    Pulse 78  -- 88  -- 81    BP  -- (!)  86/69  -- (!)  85/57  --    Noninvasive MAP (mmHg)  -- 74  -- 71  --       Oxygen Therapy    SpO2 (!)  88 %  -- 91 %  -- 90 %    Row Name 03/14/18 0502 03/14/18 0500 03/14/18 0448 03/14/18 0447 03/14/18 0445       Vitals    Temp  --  --  -- 97.7 °F (36.5 °C)  --    Temp src  --  --  -- Oral  --    Pulse  -- 81  --  -- 76    BP 99/74  -- 99/70  --  --    Noninvasive MAP (mmHg) 82  -- 86  --  --       Oxygen Therapy    SpO2  -- (!)  89 %  --  -- 91 %    Row Name 03/14/18 0433 03/14/18 0422 03/14/18 0419 03/14/18 0415 03/14/18 0404       Vitals    Pulse  -- 85  -- 84  --    /68  -- 122/71  -- 93/70    Noninvasive MAP (mmHg) 81  -- 77  -- 78       Oxygen Therapy    SpO2  -- 92 %  -- 92 %  --    Row Name 03/14/18 0400 03/14/18 0349 03/14/18 0335 03/14/18 0333 03/14/18 0330       Vitals    Pulse 77  -- 88  -- 81    BP  -- 107/60  -- 105/66  --    Noninvasive MAP (mmHg)  -- 67  -- 77  --       Oxygen Therapy    SpO2 91 %  -- 90 %  -- 93 %    Row Name 03/14/18 0318 03/14/18 0315 03/14/18 0303 03/14/18 0249 03/14/18 0248       Vitals    Pulse  -- 85  -- 76  --    BP 99/65  -- 93/64  -- 102/75    Noninvasive MAP (mmHg) 77  -- 72  -- 81       Oxygen Therapy    SpO2  -- 92 %  -- 94 %  --    Row Name 03/14/18 0242 03/14/18 0234 03/14/18 0230 03/14/18 0219 03/14/18 0207       Vitals    Pulse 86  -- 75  -- 72    BP  -- 101/64  -- 109/78  --     Noninvasive MAP (mmHg)  -- 78  -- 86  --       Oxygen Therapy    SpO2 92 %  -- 91 %  -- 96 %    Row Name 03/14/18 0204 03/14/18 0152 03/14/18 0147 03/14/18 0145 03/14/18 0133       Vitals    Pulse  -- 80  -- 86  --    BP (!)  88/72  -- (!)  87/68  -- 93/56    Noninvasive MAP (mmHg) 80  -- 80  -- 69       Oxygen Therapy    SpO2  -- 94 %  -- 95 %  --    Row Name 03/14/18 0120 03/14/18 0118 03/14/18 0115 03/14/18 0103 03/14/18 0053       Vitals    Pulse 75  -- 81  -- 73    BP  -- 105/82  -- 111/74  --    Noninvasive MAP (mmHg)  -- 89  -- 78  --       Oxygen Therapy    SpO2 93 %  -- 93 %  -- 92 %    Row Name 03/14/18 0049 03/14/18 0045 03/14/18 0033 03/14/18 0030 03/14/18 0018       Vitals    Pulse  -- 83  -- 73  --    /63  -- 107/70  -- 97/58    Noninvasive MAP (mmHg) 81  -- 77  -- 71       Oxygen Therapy    SpO2  -- 94 %  -- 95 %  --    Row Name 03/14/18 0015 03/14/18 0003 03/13/18 2347 03/13/18 2337 03/13/18 2333       Vitals    Temp  --  --  -- 99.4 °F (37.4 °C)  --    Temp src  --  --  -- Oral  --    Pulse 83  -- 82 85  --    BP  -- 92/56 (!)  86/62  -- 109/69    Noninvasive MAP (mmHg)  -- 62 66  -- 79       Oxygen Therapy    SpO2 92 %  -- 94 % 93 %  --    Row Name 03/13/18 2315 03/13/18 2304 03/13/18 2251 03/13/18 2249 03/13/18 2245       Vitals    Pulse 74  -- 85  -- 77    BP  -- 98/60  -- 95/63  --    Noninvasive MAP (mmHg)  -- 71  -- 72  --       Oxygen Therapy    SpO2 90 %  -- 91 %  -- 93 %    Row Name 03/13/18 2234 03/13/18 2230 03/13/18 2218 03/13/18 2215 03/13/18 2203       Vitals    Pulse  -- 70  -- 75  --    BP (!)  85/57  -- 91/56  -- (!)  85/67    Noninvasive MAP (mmHg) 63  -- 62  -- 73       Oxygen Therapy    SpO2  -- 93 %  -- 95 %  --    Row Name 03/13/18 2200 03/13/18 2148 03/13/18 2137 03/13/18 2133 03/13/18 2130       Vitals    Pulse 79  -- 74  -- 74    BP  -- 93/72  -- 99/68  --    Noninvasive MAP (mmHg)  -- 81  -- 78  --       Oxygen Therapy    SpO2 95 %  -- 92 %  -- 95 %    Row Name  03/13/18 2119 03/13/18 2115 03/13/18 2104 03/13/18 2100 03/13/18 2049       Vitals    Pulse  -- 73  -- 74  --    BP 97/62  -- 93/65  -- (!)  84/56    Noninvasive MAP (mmHg) 68  -- 70  -- 65       Oxygen Therapy    SpO2  -- 94 %  -- 94 %  --    Row Name 03/13/18 2035 03/13/18 2034 03/13/18 2030 03/13/18 2019 03/13/18 2015       Vitals    Pulse 71  -- 70  -- 77    BP  -- (!)  87/57  -- 115/67  --    Noninvasive MAP (mmHg)  -- 70  -- 75  --       Oxygen Therapy    SpO2 94 %  -- 93 %  -- 94 %    Row Name 03/13/18 2004 03/13/18 2000 03/13/18 1953 03/13/18 1948 03/13/18 1945       Vitals    Pulse  -- 69  --  -- 74    BP 92/66  --  -- 91/68  --    Noninvasive MAP (mmHg) 74  --  -- 77  --       Oxygen Therapy    SpO2  -- 92 %  --  -- 97 %    Device (Oxygen Therapy)  --  -- nasal cannula  --  --    Flow (L/min)  --  -- 2  --  --    Row Name 03/13/18 1933 03/13/18 1930 03/13/18 1919 03/13/18 1915 03/13/18 1904       Vitals    Temp  -- 98 °F (36.7 °C)  --  --  --    Temp src  -- Oral  --  --  --    Pulse  -- 75  -- 74  --    BP 99/73  -- 102/77  -- (!)  88/56    Noninvasive MAP (mmHg) 79  -- 80  -- 59       Oxygen Therapy    SpO2  -- 94 %  -- 92 %  --    Row Name 03/13/18 1900 03/13/18 1848 03/13/18 1845 03/13/18 1833 03/13/18 1820       Vitals    Pulse 63  -- 78  -- 69    BP  -- 100/53  -- 90/62  --    Noninvasive MAP (mmHg)  -- 67  -- 68  --       Oxygen Therapy    SpO2 (!)  87 %  -- 94 %  -- 95 %    Row Name 03/13/18 1818 03/13/18 1803 03/13/18 1800 03/13/18 1748 03/13/18 1745       Vitals    Pulse  -- 78 62  -- 64    /62 91/55  -- 99/66  --    Noninvasive MAP (mmHg) 73 64  -- 76  --       Oxygen Therapy    SpO2  -- 92 % 95 %  -- 94 %    Row Name 03/13/18 1734 03/13/18 1730 03/13/18 1719 03/13/18 1703 03/13/18 1700       Vitals    Pulse  -- 75  -- 75 74    /74  -- (!)  89/68 (!)  85/57  --    Noninvasive MAP (mmHg) 81  -- 75 71  --       Oxygen Therapy    SpO2  -- (!)  89 %  -- 93 %  --    Row Name 03/13/18 5614  03/13/18 1645 03/13/18 1633 03/13/18 1622 03/13/18 1618       Vitals    Pulse  -- 73  -- 73  --    BP (!)  86/62  -- (!)  85/47  -- (!)  87/46    Noninvasive MAP (mmHg) 68  -- 56  -- 53       Oxygen Therapy    SpO2  -- 93 %  -- 90 %  --    Row Name 03/13/18 1608 03/13/18 1605 03/13/18 1604 03/13/18 1603 03/13/18 1600       Vitals    Temp 98.1 °F (36.7 °C)  --  --  --  --    Pulse  --  -- 71  --  --    BP  -- --   Dr. Davion jasso, started on IVF  -- (!)  79/52  --    Noninvasive MAP (mmHg)  --  --  -- 61  --       Oxygen Therapy    SpO2  --  -- 93 %  --  --    Device (Oxygen Therapy)  --  --  --  -- nasal cannula    Flow (L/min)  --  --  --  -- 2    Row Name 03/13/18 1545 03/13/18 1534 03/13/18 1503 03/13/18 1303 03/13/18 1233       Vitals    Core (Body) Temperature  --  --  --  -- 99.3 °F (37.4 °C)    Pulse 71  -- 74 84 86    BP  -- (!)  75/63 (!)  89/46 (!)  84/67 (!)  88/60    Noninvasive MAP (mmHg)  -- 67 61 75 73       Oxygen Therapy    SpO2 96 %  --  --  -- 94 %    Row Name 03/13/18 1215 03/13/18 1204 03/13/18 1200             Vitals    Core (Body) Temperature 99.3 °F (37.4 °C)  --  --      Pulse 82  --  --      BP  -- (!)  86/50  --      Noninvasive MAP (mmHg)  -- 59  --         Oxygen Therapy    SpO2 93 %  --  --      Device (Oxygen Therapy)  --  -- nasal cannula      Flow (L/min)  --  -- 2        Temp:  [97.7 °F (36.5 °C)-99.4 °F (37.4 °C)] 98.8 °F (37.1 °C)  Heart Rate:  [] 92  BP: ()/() 128/98  Body mass index is 35.37 kg/m².        Physical Exam:   Physical Exam   Constitutional: He is oriented to person, place, and time.   HENT:   Head: Normocephalic and atraumatic.   Eyes: Right eye exhibits no discharge. Left eye exhibits no discharge.   Neck: No JVD present. No thyromegaly present.   Cardiovascular: Normal rate.  An irregularly irregular rhythm present. Exam reveals no gallop and no friction rub.    No murmur heard.  Pulmonary/Chest: Effort normal and breath sounds normal. He has no  rales.   Abdominal: Soft. Bowel sounds are normal. There is no tenderness.   Musculoskeletal: Normal range of motion. He exhibits no edema or deformity.   Neurological: He is alert and oriented to person, place, and time. He exhibits normal muscle tone.   Skin: Skin is warm and dry. No erythema.   Psychiatric: He has a normal mood and affect. His behavior is normal. Thought content normal.        Labs:   Lab Review:       Results from last 7 days  Lab Units 03/14/18  0451 03/13/18  0409 03/12/18  1811 03/12/18  0941 03/12/18  0527 03/12/18  0019   SODIUM mmol/L 144 143 136 135* 132* 137   POTASSIUM mmol/L 4.1 3.6 4.0 5.1 7.0* 4.3   CHLORIDE mmol/L 107 105 100 100 96* 94*   CO2 mmol/L 22.6 23.6 20.7* 16.2* 17.4* 16.7*   BUN mg/dL 46* 43* 46* 39* 32* 23*   CREATININE mg/dL 1.76* 1.94* 2.39* 2.36* 2.25* 1.93*   GLUCOSE mg/dL 252* 140* 274* 498* 544* 552*   CALCIUM mg/dL 8.0* 7.7* 8.1* 8.0* 7.7* 8.5*   AST (SGOT) U/L  --  45*  --   --  180* 152*   ALT (SGPT) U/L  --  71*  --   --  140* 130*       Results from last 7 days  Lab Units 03/13/18  0409 03/12/18  0019   TROPONIN T ng/mL 0.131* <0.010       Results from last 7 days  Lab Units 03/14/18  0451   WBC 10*3/mm3 13.70*   HEMOGLOBIN g/dL 12.4*   HEMATOCRIT % 38.2*   PLATELETS 10*3/mm3 197       Results from last 7 days  Lab Units 03/12/18  0413 03/12/18  0019   INR  1.24* 1.29*  1.30*   APTT seconds  --  35.6*  35.4           Results from last 7 days  Lab Units 03/14/18  0451   MAGNESIUM mg/dL 2.1           Results from last 7 days  Lab Units 03/12/18  0413   PROBNP pg/mL 1,721.0*                   Imaging:     Imaging Results (most recent)     Procedure Component Value Units Date/Time    XR Chest Post CVA Port [[id number]] Collected:  03/12/18 1629     Updated:  03/12/18 1634    Narrative:       XR CHEST POST CVA PORT-     INDICATIONS:    Line placement     TECHNIQUE: FRONTAL VIEW OF THE CHEST     COMPARISON: 3/12/2018 0010 hours     FINDINGS:     Endotracheal tube  tip is 7.9 cm above the dottie. Right IJ catheter  extends to the superior vena cava. NG tube extends beyond the diaphragm  and off image inferiorly. Heart is mildly enlarged. No obvious  pneumothorax is noted, limited by supine positioning. No focal pulmonary  consolidation or pleural effusion. Otherwise stable.       Impression:          Right IJ catheter, no pneumothorax is seen, limited by supine  positioning. Endotracheal tube tip is 7.9 cm above the dottie.      This report was finalized on 3/12/2018 4:31 PM by Dr. Pato Terry MD.       XR Abdomen KUB [[id number]] Collected:  03/12/18 0423     Updated:  03/12/18 0427    Narrative:       SINGLE VIEW ABDOMEN/KUB     HISTORY:OG Placement; I46.9-Cardiac arrest, cause unspecified;  I49.01-Ventricular fibrillation; I48.91-Unspecified atrial fibrillation;  R41.82-Altered mental status, unspecified     COMPARISON: 3:01 AM.     FINDINGS: 2 portable views of the upper abdomen obtained. Nasogastric  tube is now coiled beneath the left hemidiaphragm at the level of the  proximal stomach.             Impression:       NG tube as above      This report was finalized on 3/12/2018 4:24 AM by Roberto Carlos Yates MD.       XR Abdomen KUB [[id number]] Collected:  03/12/18 0305     Updated:  03/12/18 0310    Narrative:       SINGLE VIEW ABDOMEN/KUB     HISTORY:OG verification; I46.9-Cardiac arrest, cause unspecified;  I49.01-Ventricular fibrillation; I48.91-Unspecified atrial fibrillation;  R41.82-Altered mental status, unspecified     COMPARISON: None.     FINDINGS: 3 portable views of the abdomen obtained. Tissue penetration  limited by body habitus. On one of the images, the distal portion of the  nasogastric tube is faintly seen above the diaphragm at the level of the  distal esophagus. Suggest tube advancement 15-20 cm.             Impression:       Nasogastric tube as above      This report was finalized on 3/12/2018 3:07 AM by Roberto Carlos Yates MD.       CT Head Without  Contrast [[id number]] Collected:  03/12/18 0116     Updated:  03/12/18 0120    Narrative:       CRANIAL CT SCAN WITHOUT CONTRAST     CLINICAL HISTORY: Confusion/delirium, altered LOC, unexplained;  I46.9-Cardiac arrest, cause unspecified; I49.01-Ventricular  fibrillation; I48.91-Unspecified atrial fibrillation; R41.82-Altered  mental status, unspecified     COMPARISON: None.     TECHNIQUE: Radiation dose reduction techniques were utilized, including  automated exposure control and exposure modulation based on body size.  Multiple axial images of the head were obtained without contrast.      FINDINGS:  There are no abnormal areas of increased density or mass  effect.      Ventricles, sulci, and cisterns appear normal. Bone window  images are unremarkable.                Impression:       1. No acute intracranial abnormality.                     This study was performed with techniques to keep radiation doses as low  as reasonably achievable (ALARA). Individualized dose reduction  techniques using automated exposure control or adjustment of mA and/or  kV according to the patient size were employed.      This report was finalized on 3/12/2018 1:17 AM by Roberto Carlos Yates MD.       XR Chest 1 View [[id number]] Collected:  03/12/18 0020     Updated:  03/12/18 0024    Narrative:       PORTABLE CHEST X-RAY     CLINICAL HISTORY: v fib arrest; I46.9-Cardiac arrest, cause unspecified;  I49.01-Ventricular fibrillation; I21.3-ST elevation (STEMI) myocardial  infarction of unspecified site; I48.91-Unspecified atrial fibrillation;  R41.82-Altered mental status, unspecified     COMPARISON: None.     FINDINGS: Portable AP view of the chest was obtained with overlying  monitor leads in place. ET tube projects at the level of the mid  thoracic trachea. Hazy bilateral opacities likely related to pulmonary  edema. There is a small right pleural effusion. Heart is enlarged.             Impression:       Patient intubated, findings of mild  "CHF with right effusion  noted        This report was finalized on 3/12/2018 12:21 AM by Roberto Carlos Yates MD.               EKG:       Baseline:        I personally viewed and interpreted the patient's EKG/Telemetry data.    Assessment:     Active Problems:    Cardiac arrest with ventricular fibrillation        Plan:       Wow -- this tricky   No insurance. Cocaine was involved (not too much by his report)   But he has an underlying cardiomyopathy and AF -- of ? Duration   Was cathed in 2009 for unclear reasons -- no lesions. But I wonder whether he had cm then  He has diabetes.     He says he will turn his life around.     Normally I would not put an ICD in for cocaine related VF yann it's secondary cause.     But he has clear-cut underlying heart disease (cardiomyopathy) which is also a potential cause.     Given his young age and promise to change his life, I think an ICD is best for secondary prevention     AFter the ICD, I would start anticoagulation and rate control and then cardioversion w ARIA in a month.     WE had an in-depth conv and have a sense he his done with the \"bad stuff\"     Thank you for allowing me to participate in the care of Shannan Arguelles. Feel free to contact me directly with any further questions or concerns.    Cate Menjivar RN  [facility]  03/14/18  11:55 AM  "

## 2018-03-14 NOTE — PROGRESS NOTES
"   LOS: 1 day    Patient Care Team:  No Known Provider as PCP - General    Chief Complaint:    Chief Complaint   Patient presents with   • Cardiac Arrest     Follow UP SUHAIL  Subjective     Interval History:   Pt seen earlier today.   Extubated, Villarreal is out.   Good UO. Still very somnolent. bp is low.   Off IVF as well.   On precedex due to agitation.     Objective     Vital Signs  Temp:  [98 °F (36.7 °C)-99.5 °F (37.5 °C)] 98 °F (36.7 °C)  Heart Rate:  [] 69  Resp:  [20-23] 23  BP: ()/() 106/62  FiO2 (%):  [30 %-40 %] 30 %    Flowsheet Rows    Flowsheet Row First Filed Value   Admission Height 200.7 cm (79\") Documented at 03/11/2018 2344   Admission Weight (!)  141 kg (310 lb) ['s license] Documented at 03/11/2018 2344          No intake/output data recorded.  I/O last 3 completed shifts:  In: 9775.6 [I.V.:9090.6; IV Piggyback:685]  Out: 2825 [Urine:2325; Emesis/NG output:500]    Intake/Output Summary (Last 24 hours) at 03/13/18 2008  Last data filed at 03/13/18 1659   Gross per 24 hour   Intake          5869.28 ml   Output             2350 ml   Net          3519.28 ml       Physical Exam:  General Appearance: alert, bu somnolent. following commands. no acute distress,   Skin: warm and dry  HEENT: hoarse voice. nonicteric sclerae.   Neck: supple, no JVD, trachea midline  Lungs: decreased tramaine. cta tramaine   Heart: RRR, normal S1 and S2, no S3, no rub  Abdomen: soft, non-tender,  no HSM.   : no palpable bladder, no Villarreal.   Joints: No significant deformities noted, no crepitation over the knees or the ankles.  Lymphatics: No cervical or supraclavicular lymphadenopathy  Extremities: no edema, cyanosis or clubbing  Neuro: Awake, following simple commands, moving all extremities.      Results Review:      Results from last 7 days  Lab Units 03/13/18  0409 03/12/18  1811 03/12/18  0941 03/12/18  0527 03/12/18  0019   SODIUM mmol/L 143 136 135* 132* 137   POTASSIUM mmol/L 3.6 4.0 5.1 7.0* 4.3 "   CHLORIDE mmol/L 105 100 100 96* 94*   CO2 mmol/L 23.6 20.7* 16.2* 17.4* 16.7*   BUN mg/dL 43* 46* 39* 32* 23*   CREATININE mg/dL 1.94* 2.39* 2.36* 2.25* 1.93*   CALCIUM mg/dL 7.7* 8.1* 8.0* 7.7* 8.5*   BILIRUBIN mg/dL 1.0  --   --  1.0 0.6   ALK PHOS U/L 55  --   --  96 99   ALT (SGPT) U/L 71*  --   --  140* 130*   AST (SGOT) U/L 45*  --   --  180* 152*   GLUCOSE mg/dL 140* 274* 498* 544* 552*       Estimated Creatinine Clearance: 72 mL/min (by C-G formula based on SCr of 1.94 mg/dL (H)).      Results from last 7 days  Lab Units 03/13/18  0409 03/12/18  1811 03/12/18  0941 03/12/18  0019   MAGNESIUM mg/dL 2.1  --   --  2.9*   PHOSPHORUS mg/dL 4.3 3.3 3.3 9.0*         Results from last 7 days  Lab Units 03/13/18  0409   URIC ACID mg/dL 6.0         Results from last 7 days  Lab Units 03/13/18  0409 03/12/18  1958 03/12/18  0413 03/12/18  0019   WBC 10*3/mm3 16.17*  --  27.27* 17.04*  16.06*   HEMOGLOBIN g/dL 14.3 15.5 16.7 15.3  15.6   PLATELETS 10*3/mm3 240  --  294 265  256         Results from last 7 days  Lab Units 03/12/18  0413 03/12/18  0019   INR  1.24* 1.29*  1.30*         Imaging Results (last 24 hours)     ** No results found for the last 24 hours. **          insulin aspart 0-14 Units Subcutaneous Q4H   pantoprazole 40 mg Intravenous QAM AC   piperacillin-tazobactam 3.375 g Intravenous Q8H       dexmedetomidine 0.2-1.5 mcg/kg/hr Last Rate: 0.3 mcg/kg/hr (03/13/18 1856)   diltiaZEM 5-15 mg/hr Last Rate: 5 mg/hr (03/13/18 1605)   lactated ringers 100 mL/hr Last Rate: 100 mL/hr (03/13/18 1553)   phenylephrine 0.5-3 mcg/kg/min Last Rate: 0.5 mcg/kg/min (03/13/18 1701)   sodium chloride  Last Rate: 1,000 mL/hr (03/11/18 2351)       Medication Review:   Current Facility-Administered Medications   Medication Dose Route Frequency Provider Last Rate Last Dose   • amiodarone (CORDARONE) injection   Intravenous Code / Trauma / Sedation Medication Jimmy Rodrigez MD   300 mg at 03/11/18 6231   •  dexmedetomidine (PRECEDEX) infusion in NaCl 200 mcg/50 mL  0.2-1.5 mcg/kg/hr Intravenous Titrated Linda Blank MD 10.65 mL/hr at 03/13/18 1856 0.3 mcg/kg/hr at 03/13/18 1856   • dextrose (D50W) solution 25 g  25 g Intravenous Q15 Min PRN Linda Blank MD       • dextrose (GLUTOSE) oral gel 15 g  15 g Oral Q15 Min PRN Linda Blank MD       • diltiaZEM (CARDIZEM) 100 mg/100 mL (1 mg/mL) 0.9% NS  5-15 mg/hr Intravenous Titrated Valentin Moya MD 5 mL/hr at 03/13/18 1605 5 mg/hr at 03/13/18 1605   • glucagon (human recombinant) (GLUCAGEN DIAGNOSTIC) injection 1 mg  1 mg Subcutaneous PRN Linda Blank MD       • insulin aspart (novoLOG) injection 0-14 Units  0-14 Units Subcutaneous Q4H Linda Blank MD   5 Units at 03/13/18 1943   • lactated ringers infusion  100 mL/hr Intravenous Continuous Aziz Federico Ricardo  mL/hr at 03/13/18 1553 100 mL/hr at 03/13/18 1553   • magnesium sulfate injection    Code / Trauma / Sedation Medication Jimmy Rodrigez MD   2 g at 03/11/18 2334   • ondansetron (ZOFRAN) injection 4 mg  4 mg Intravenous Q4H PRN Howard Hurt MD   4 mg at 03/12/18 0209   • pantoprazole (PROTONIX) injection 40 mg  40 mg Intravenous QAM AC Linda Blank MD   40 mg at 03/13/18 0655   • phenylephrine (BREANA-SYNEPHRINE) 50 mg/250 mL (0.2 mg/mL) in 0.9% NS  infusion  0.5-3 mcg/kg/min Intravenous Titrated Valentin Moya MD 21.5 mL/hr at 03/13/18 1701 0.5 mcg/kg/min at 03/13/18 1701   • piperacillin-tazobactam (ZOSYN) 3.375 g in iso-osmotic dextrose 50 ml (premix)  3.375 g Intravenous Q8H Linda Blank MD   3.375 g at 03/13/18 1701   • sodium chloride 0.9 % flush 10 mL  10 mL Intravenous PRN Jimmy Rodrigez MD       • sodium chloride 0.9 % infusion   Intravenous Code / Trauma / Sedation Continuous Med Jimmy Rodrigez MD 1,000 mL/hr at 03/11/18 2351 1,000 mL/hr at 03/11/18 2351       Assessment/Plan     Active Problems:    Cardiac arrest with ventricular fibrillation         1. Non-oliguric SUHAIL   associated with his cardiac arrest.  No baseline renal function available at this time. Cr is better. Good UO. Villarreal is out. Off bicarb drip and NS. bp is running low.   Would rather bolus with IVF than resume pressors. D/w nurse. Will restart LR at 100 if needed.   2.  Severe hyperkalemia associated with metabolic acidosis and severe hyperglycemia, resolved. I expect a modest rise in K now that he is off bicarb drip.   3.  Hyponatremia associated with hyperglycemia, resolved.   4.  Status post V. Fib/arrest, possible withdrawal sx.   5.  History of diabetes mellitus type 2  6.  Poly-substance abuse  7.  Leucocytosis  D/w his mother and nursing staff.   Will follow.     Anjelica Ricardo MD  03/13/18  8:08 PM

## 2018-03-14 NOTE — PROGRESS NOTES
"      [facility]         Dr Mckeon Sayied   LOS: 2 days   Patient Care Team:  No Known Provider as PCP - General    Chief Complaint: Resuscitated V. fib arrest in the setting of positive drug screen.  Persistent lactic acidosis and acute kidney injury and hyperkalemia.    Interval History: Extubated yesterday.  Awake alert and following simple commands.  No specific complaints reported.  Plans for cardiac cath per cardiology.    REVIEW OF SYSTEMS:   Extubated and doing well  No chest pain no shortness of breath.  Off Precedex drip.    Vent none at this time      Vital Signs  Temp:  [97.7 °F (36.5 °C)-99.4 °F (37.4 °C)] 98.8 °F (37.1 °C)  Heart Rate:  [] 102  BP: ()/() 147/106  FiO2 (%):  [30 %] 30 %    Intake/Output Summary (Last 24 hours) at 03/14/18 0954  Last data filed at 03/14/18 0900   Gross per 24 hour   Intake          3131.93 ml   Output             2425 ml   Net           706.93 ml     Flowsheet Rows    Flowsheet Row First Filed Value   Admission Height 200.7 cm (79\") Documented at 03/11/2018 2344   Admission Weight (!)  141 kg (310 lb) ['s license] Documented at 03/11/2018 2344          Physical Exam:   General Appearance:    Awake alert no distress.  No issues reported.     Lungs:     Equal breath sounds with rhonchi and crackles on the bases.      Heart:    Regular rhythm and normal rate, normal S1 and S2, no            murmur, no gallop, no rub, no click   Chest Wall:    No abnormalities observed   Abdomen:    Soft mildly distended.  Diffuse tenderness no rebound or guarding.     Extremities:   Moves all extremities well, 1+ edema, no cyanosis, no             redness     Results Review:          Results from last 7 days  Lab Units 03/14/18  0451 03/13/18  0409 03/12/18  1811   SODIUM mmol/L 144 143 136   POTASSIUM mmol/L 4.1 3.6 4.0   CHLORIDE mmol/L 107 105 100   CO2 mmol/L 22.6 23.6 20.7*   BUN mg/dL 46* 43* 46*   CREATININE mg/dL 1.76* 1.94* 2.39*   GLUCOSE " mg/dL 252* 140* 274*   CALCIUM mg/dL 8.0* 7.7* 8.1*       Results from last 7 days  Lab Units 03/13/18  0409 03/12/18  0019   TROPONIN T ng/mL 0.131* <0.010       Results from last 7 days  Lab Units 03/14/18  0451 03/13/18  0409 03/12/18  1958 03/12/18  0413   WBC 10*3/mm3 13.70* 16.17*  --  27.27*   HEMOGLOBIN g/dL 12.4* 14.3 15.5 16.7   HEMATOCRIT % 38.2* 43.6 46.3 50.2   PLATELETS 10*3/mm3 197 240  --  294       Results from last 7 days  Lab Units 03/12/18  0413 03/12/18  0019   INR  1.24* 1.29*  1.30*   APTT seconds  --  35.6*  35.4           Results from last 7 days  Lab Units 03/14/18  0451   MAGNESIUM mg/dL 2.1           Results from last 7 days  Lab Units 03/13/18  1132   PH, ARTERIAL pH units 7.417   PO2 ART mm Hg 74.0*   PCO2, ARTERIAL mm Hg 35.0   HCO3 ART mmol/L 22.6       I reviewed the patient's new clinical results.  I personally viewed and interpreted the patient's CXR        Medication Review:     aspirin 81 mg Oral Daily   enoxaparin 1 mg/kg Subcutaneous Q12H   insulin aspart 0-14 Units Subcutaneous Q4H   metoprolol tartrate 25 mg Oral Q12H   pantoprazole 40 mg Intravenous QAM AC   piperacillin-tazobactam 3.375 g Intravenous Q8H         dexmedetomidine 0.2-1.5 mcg/kg/hr Last Rate: 0.3 mcg/kg/hr (03/14/18 7826)   diltiaZEM 5-15 mg/hr Last Rate: 15 mg/hr (03/14/18 4785)   phenylephrine 0.5-3 mcg/kg/min Last Rate: Stopped (03/14/18 1253)   sodium chloride  Last Rate: 1,000 mL/hr (03/11/18 5408)   sodium chloride 9 mL/hr Last Rate: 9 mL/hr (03/14/18 5595)       ASSESSMENT:   Resuscitated V. fib arrest  Acute respiratory failure, mechanical ventilation  Aspiration pneumonia  Sinus tachycardia  Atrial fibrillation presumed new onset  Positive UDS  Acute kidney injury with severe hyperkalemia  Severe lactic acidosis  Possible GI bleed    PLAN:  Continue supportive care  Improving leukocytosis and lactic acidosis now   IV fluids per renal  Remains on full dose anticoagulation per cardiology.  Extubated  and tolerating extubation well.  Wean oxygen as tolerated.  Continue Zosyn for possible aspiration pneumonia  Mild drop in hemoglobin ongoing.  We will continue to monitor  No active bleeding reported.  Plans per GI noted.  Discussed with family  Okay to transfer out of the ICU once cleared by cardiology.      Linda Blank MD  03/14/18  9:54 AM

## 2018-03-14 NOTE — PROGRESS NOTES
Discharge Planning Assessment  [facility]     Patient Name: Shannan Arguelles  MRN: [id number]  Today's Date: 3/14/2018    Admit Date: 3/11/2018          Discharge Needs Assessment     Row Name 03/14/18 1429       Living Environment    Lives With parent(s)    Name(s) of Who Lives With Patient Pt's mother Jany Arguelles lives with him and he states that he cares for her.  His brother has come from [place] to care for her for now.    Current Living Arrangements home/apartment/condo    Primary Care Provided by self    Provides Primary Care For parent(s)    Family Caregiver if Needed none    Quality of Family Relationships unable to assess    Able to Return to Prior Arrangements yes       Resource/Environmental Concerns    Transportation Concerns car, none       Transition Planning    Patient/Family Anticipates Transition to inpatient rehabilitation facility;home with family;home with help/services    Patient/Family Anticipated Services at Transition outpatient care    Transportation Anticipated car, drives self       Discharge Needs Assessment    Readmission Within the Last 30 Days no previous admission in last 30 days    Concerns to be Addressed discharge planning    Concerns Comments continue to assess for skilled needs    Equipment Currently Used at Home none    Anticipated Changes Related to Illness none    Equipment Needed After Discharge none    Outpatient/Agency/Support Group Needs other (see comments)    Discharge Facility/Level of Care Needs home with home health    Offered/Gave Vendor List yes    Current Discharge Risk substance use/abuse            Discharge Plan     Row Name 03/14/18 2106       Plan    Plan Undetermined vs Home with family assist.    Patient/Family in Agreement with Plan yes    Plan Comments Spoke with pt at bedside.  Introduced self and explained role.  CCP contact information provided.  Face sheet and pharmacy verified.  Pt was independent prior to admission.  He states that he cares  for his mother who lives with him.  His brother is now caring her her.  He denies any prior use of HH or SNF.  He states that his plan is to return home at discharge.  He denies the need for services at present.  CCP will continue to follow and assess for skilled needs.        Destination     No service coordination in this encounter.      Durable Medical Equipment     No service coordination in this encounter.      Dialysis/Infusion     No service coordination in this encounter.      Home Medical Care     No service coordination in this encounter.      Social Care     No service coordination in this encounter.                Demographic Summary     Row Name 03/14/18 1428       General Information    Admission Type inpatient    Arrived From home    Reason for Consult discharge planning    Preferred Language English     Used During This Interaction no       Contact Information    Permission Granted to Share Info With family/designee            Functional Status     Row Name 03/14/18 1429       Functional Status    Usual Activity Tolerance excellent    Current Activity Tolerance moderate       Functional Status, IADL    Medications independent    Meal Preparation independent    Housekeeping independent    Laundry independent    Shopping independent       Mental Status    General Appearance WDL WDL       Mental Status Summary    Recent Changes in Mental Status/Cognitive Functioning no changes       Employment/    Employment Status unemployed            Psychosocial    No documentation.           Abuse/Neglect    No documentation.           Legal    No documentation.           Substance Abuse    No documentation.           Patient Forms     Row Name 03/14/18 1438       Patient Forms    Provider Choice List Delivered   HH list    Delivered to Patient    Method of delivery In person          Lili Cabrales RN

## 2018-03-14 NOTE — PLAN OF CARE
Problem: Patient Care Overview  Goal: Plan of Care Review  Outcome: Ongoing (interventions implemented as appropriate)   03/14/18 0644   OTHER   Outcome Summary Pt now on room air. Vadim and cardizem turned off around 5am and BP and HR stable. Remains in afib. Pt voided about 800 cc urine overnight. Only complain of pain when rolling due to CPR.      Goal: Individualization and Mutuality  Outcome: Ongoing (interventions implemented as appropriate)    Goal: Discharge Needs Assessment  Outcome: Ongoing (interventions implemented as appropriate)    Goal: Interprofessional Rounds/Family Conf  Outcome: Ongoing (interventions implemented as appropriate)      Problem: Pain, Acute (Adult)  Goal: Identify Related Risk Factors and Signs and Symptoms  Outcome: Ongoing (interventions implemented as appropriate)    Goal: Acceptable Pain Control/Comfort Level  Outcome: Ongoing (interventions implemented as appropriate)

## 2018-03-14 NOTE — PROGRESS NOTES
"Shannan DOYLE Blane  1965 52 y.o.  [id number]      Patient Care Team:  No Known Provider as PCP - General    CC: Cardiac arrest, severe cardiomyopathy, diabetes, hypertension, cocaine abuse    Interval History: He's feeling well and is extubated today and in discussing with him he had a history of an abnormal EKG in the past he says and it sounds like he maybe even had a heart catheter several years ago that was normal.  He does use cocaine he did use it the night before he came in he denies really other drug use although he has used weed but not in the number of weeks does not say drinks alcohol very much      Objective   Vital Signs  Temp:  [97.7 °F (36.5 °C)-99.4 °F (37.4 °C)] 98.8 °F (37.1 °C)  Heart Rate:  [] 106  BP: ()/() 137/80  FiO2 (%):  [30 %] 30 %    Intake/Output Summary (Last 24 hours) at 03/14/18 1029  Last data filed at 03/14/18 0900   Gross per 24 hour   Intake          3131.93 ml   Output             2025 ml   Net          1106.93 ml     Flowsheet Rows    Flowsheet Row First Filed Value   Admission Height 200.7 cm (79\") Documented at 03/11/2018 2344   Admission Weight (!)  141 kg (310 lb) ['s license] Documented at 03/11/2018 2344          Physical Exam:   General Appearance:    Alert,oriented, in no acute distress   Lungs:     Clear to auscultation,BS are equal    Heart:    Normal S1 and S2, RRR without murmur, gallop or rub   HEENT:    Sclera are clear, no JVD or adenopathy   Abdomen:     Normal bowel sounds, soft non-tender, non-distended, no HSM   Extremities:   Moves all extremities well, no edema, no cyanosis, no             Redness, no rash     Medication Review:        aspirin 81 mg Oral Daily   enoxaparin 1 mg/kg Subcutaneous Q12H   insulin aspart 0-14 Units Subcutaneous Q4H   metoprolol tartrate 25 mg Oral Q12H   pantoprazole 40 mg Intravenous QAM AC   piperacillin-tazobactam 3.375 g Intravenous Q8H       dexmedetomidine 0.2-1.5 mcg/kg/hr Last Rate: 0.3 mcg/kg/hr " (03/14/18 8616)   diltiaZEM 5-15 mg/hr Last Rate: 15 mg/hr (03/14/18 1534)   phenylephrine 0.5-3 mcg/kg/min Last Rate: Stopped (03/14/18 7512)   sodium chloride  Last Rate: 1,000 mL/hr (03/11/18 2418)   sodium chloride 9 mL/hr Last Rate: 9 mL/hr (03/14/18 9052)         I reviewed the patient's new clinical results.  I personally viewed and interpreted the patient's EKG/Telemetry data    Assessment/Plan  Active Hospital Problems (** Indicates Principal Problem)    Diagnosis Date Noted   • Cardiac arrest with ventricular fibrillation [I46.9, I49.01] 03/12/2018      Resolved Hospital Problems    Diagnosis Date Noted Date Resolved   No resolved problems to display.       Severe cardiomyopathy and history of cocaine ingestion diabetes hypertension.  He's actually done very well from a cardiac arrest standpoint.  He is in A. fib we'll try and slow him down with beta blocker and trying get him off the IV diltiazem we will also do a heart catheter on him tomorrow we'll start him on full dose anticoagulation today probably need to have the arrhythmia service see him    Valentin Moya MD  03/14/18  10:29 AM

## 2018-03-14 NOTE — PLAN OF CARE
Problem: Patient Care Overview  Goal: Plan of Care Review  Outcome: Ongoing (interventions implemented as appropriate)   03/14/18 1031   OTHER   Outcome Summary Bedside swallow eval completed. Recommend regular with thins, no straws. ST to follow for diet tolerance.    Coping/Psychosocial   Plan of Care Reviewed With patient;family

## 2018-03-15 ENCOUNTER — APPOINTMENT (OUTPATIENT)
Dept: CARDIOLOGY | Facility: HOSPITAL | Age: 53
End: 2018-03-15
Attending: INTERNAL MEDICINE

## 2018-03-15 LAB
ALBUMIN SERPL-MCNC: 3.2 G/DL (ref 3.5–5.2)
ALBUMIN SERPL-MCNC: 3.5 G/DL (ref 3.5–5.2)
ALBUMIN/GLOB SERPL: 1.1 G/DL
ALP SERPL-CCNC: 66 U/L (ref 39–117)
ALT SERPL W P-5'-P-CCNC: 39 U/L (ref 1–41)
ANION GAP SERPL CALCULATED.3IONS-SCNC: 20.9 MMOL/L
ANION GAP SERPL CALCULATED.3IONS-SCNC: 23.3 MMOL/L
AST SERPL-CCNC: 14 U/L (ref 1–40)
BACTERIA STL CULT: NORMAL
BACTERIA STL CULT: NORMAL
BH CV ECHO MEAS - BSA(HAYCOCK): 2.9 M^2
BH CV ECHO MEAS - BSA: 2.8 M^2
BH CV ECHO MEAS - BZI_BMI: 35.4 KILOGRAMS/M^2
BH CV ECHO MEAS - BZI_METRIC_HEIGHT: 200.7 CM
BH CV ECHO MEAS - BZI_METRIC_WEIGHT: 142.4 KG
BILIRUB SERPL-MCNC: 1.3 MG/DL (ref 0.1–1.2)
BUN BLD-MCNC: 26 MG/DL (ref 6–20)
BUN BLD-MCNC: 27 MG/DL (ref 6–20)
BUN/CREAT SERPL: 18.8 (ref 7–25)
BUN/CREAT SERPL: 21.1 (ref 7–25)
CALCIUM SPEC-SCNC: 8.5 MG/DL (ref 8.6–10.5)
CALCIUM SPEC-SCNC: 8.5 MG/DL (ref 8.6–10.5)
CHLORIDE SERPL-SCNC: 100 MMOL/L (ref 98–107)
CHLORIDE SERPL-SCNC: 99 MMOL/L (ref 98–107)
CO2 SERPL-SCNC: 17.7 MMOL/L (ref 22–29)
CO2 SERPL-SCNC: 20.1 MMOL/L (ref 22–29)
CREAT BLD-MCNC: 1.28 MG/DL (ref 0.76–1.27)
CREAT BLD-MCNC: 1.38 MG/DL (ref 0.76–1.27)
DEPRECATED RDW RBC AUTO: 50.8 FL (ref 37–54)
ERYTHROCYTE [DISTWIDTH] IN BLOOD BY AUTOMATED COUNT: 13.5 % (ref 11.5–14.5)
GFR SERPL CREATININE-BSD FRML MDRD: 54 ML/MIN/1.73
GFR SERPL CREATININE-BSD FRML MDRD: 59 ML/MIN/1.73
GLOBULIN UR ELPH-MCNC: 2.9 GM/DL
GLUCOSE BLD-MCNC: 289 MG/DL (ref 65–99)
GLUCOSE BLD-MCNC: 335 MG/DL (ref 65–99)
GLUCOSE BLDC GLUCOMTR-MCNC: 190 MG/DL (ref 70–130)
GLUCOSE BLDC GLUCOMTR-MCNC: 251 MG/DL (ref 70–130)
GLUCOSE BLDC GLUCOMTR-MCNC: 259 MG/DL (ref 70–130)
GLUCOSE BLDC GLUCOMTR-MCNC: 267 MG/DL (ref 70–130)
GLUCOSE BLDC GLUCOMTR-MCNC: 276 MG/DL (ref 70–130)
GLUCOSE BLDC GLUCOMTR-MCNC: 335 MG/DL (ref 70–130)
HCT VFR BLD AUTO: 39.8 % (ref 40.4–52.2)
HCT VFR BLDA CALC: 35 % (ref 38–51)
HCT VFR BLDA CALC: 36 % (ref 38–51)
HGB BLD-MCNC: 12.9 G/DL (ref 13.7–17.6)
HGB BLDA-MCNC: 11.9 G/DL (ref 12–17)
HGB BLDA-MCNC: 12.2 G/DL (ref 12–17)
Lab: NORMAL
MAGNESIUM SERPL-MCNC: 1.9 MG/DL (ref 1.6–2.6)
MCH RBC QN AUTO: 33.2 PG (ref 27–32.7)
MCHC RBC AUTO-ENTMCNC: 32.4 G/DL (ref 32.6–36.4)
MCV RBC AUTO: 102.6 FL (ref 79.8–96.2)
O+P SPEC MICRO: NORMAL
PHOSPHATE SERPL-MCNC: 2.8 MG/DL (ref 2.5–4.5)
PHOSPHATE SERPL-MCNC: 3.4 MG/DL (ref 2.5–4.5)
PLATELET # BLD AUTO: 190 10*3/MM3 (ref 140–500)
PMV BLD AUTO: 10.5 FL (ref 6–12)
POTASSIUM BLD-SCNC: 3.8 MMOL/L (ref 3.5–5.2)
POTASSIUM BLD-SCNC: 4.2 MMOL/L (ref 3.5–5.2)
PROT SERPL-MCNC: 6.1 G/DL (ref 6–8.5)
RBC # BLD AUTO: 3.88 10*6/MM3 (ref 4.6–6)
SAO2 % BLDA: 74 % (ref 95–98)
SAO2 % BLDA: 93 % (ref 95–98)
SODIUM BLD-SCNC: 140 MMOL/L (ref 136–145)
SODIUM BLD-SCNC: 141 MMOL/L (ref 136–145)
T4 FREE SERPL-MCNC: 1.64 NG/DL (ref 0.93–1.7)
TSH SERPL DL<=0.05 MIU/L-ACNC: 2.16 MIU/ML (ref 0.27–4.2)
WBC NRBC COR # BLD: 12.16 10*3/MM3 (ref 4.5–10.7)

## 2018-03-15 PROCEDURE — 93325 DOPPLER ECHO COLOR FLOW MAPG: CPT

## 2018-03-15 PROCEDURE — 93460 R&L HRT ART/VENTRICLE ANGIO: CPT | Performed by: INTERNAL MEDICINE

## 2018-03-15 PROCEDURE — 83735 ASSAY OF MAGNESIUM: CPT | Performed by: INTERNAL MEDICINE

## 2018-03-15 PROCEDURE — 99253 IP/OBS CNSLTJ NEW/EST LOW 45: CPT | Performed by: INTERNAL MEDICINE

## 2018-03-15 PROCEDURE — B246ZZ4 ULTRASONOGRAPHY OF RIGHT AND LEFT HEART, TRANSESOPHAGEAL: ICD-10-PCS | Performed by: INTERNAL MEDICINE

## 2018-03-15 PROCEDURE — B2151ZZ FLUOROSCOPY OF LEFT HEART USING LOW OSMOLAR CONTRAST: ICD-10-PCS | Performed by: INTERNAL MEDICINE

## 2018-03-15 PROCEDURE — 25010000002 ENOXAPARIN PER 10 MG: Performed by: INTERNAL MEDICINE

## 2018-03-15 PROCEDURE — 93325 DOPPLER ECHO COLOR FLOW MAPG: CPT | Performed by: INTERNAL MEDICINE

## 2018-03-15 PROCEDURE — 85018 HEMOGLOBIN: CPT

## 2018-03-15 PROCEDURE — 93312 ECHO TRANSESOPHAGEAL: CPT | Performed by: INTERNAL MEDICINE

## 2018-03-15 PROCEDURE — 93312 ECHO TRANSESOPHAGEAL: CPT

## 2018-03-15 PROCEDURE — 84443 ASSAY THYROID STIM HORMONE: CPT | Performed by: INTERNAL MEDICINE

## 2018-03-15 PROCEDURE — 25010000002 MIDAZOLAM PER 1 MG: Performed by: INTERNAL MEDICINE

## 2018-03-15 PROCEDURE — 25010000002 DIGOXIN PER 500 MCG: Performed by: INTERNAL MEDICINE

## 2018-03-15 PROCEDURE — B2111ZZ FLUOROSCOPY OF MULTIPLE CORONARY ARTERIES USING LOW OSMOLAR CONTRAST: ICD-10-PCS | Performed by: INTERNAL MEDICINE

## 2018-03-15 PROCEDURE — 93320 DOPPLER ECHO COMPLETE: CPT

## 2018-03-15 PROCEDURE — 80069 RENAL FUNCTION PANEL: CPT | Performed by: INTERNAL MEDICINE

## 2018-03-15 PROCEDURE — 63710000001 INSULIN DETEMER PER 5 UNITS: Performed by: INTERNAL MEDICINE

## 2018-03-15 PROCEDURE — 85027 COMPLETE CBC AUTOMATED: CPT | Performed by: INTERNAL MEDICINE

## 2018-03-15 PROCEDURE — 25010000002 HEPARIN (PORCINE) PER 1000 UNITS: Performed by: INTERNAL MEDICINE

## 2018-03-15 PROCEDURE — 25010000002 PIPERACILLIN SOD-TAZOBACTAM PER 1 G: Performed by: INTERNAL MEDICINE

## 2018-03-15 PROCEDURE — 82962 GLUCOSE BLOOD TEST: CPT

## 2018-03-15 PROCEDURE — 4A023N8 MEASUREMENT OF CARDIAC SAMPLING AND PRESSURE, BILATERAL, PERCUTANEOUS APPROACH: ICD-10-PCS | Performed by: INTERNAL MEDICINE

## 2018-03-15 PROCEDURE — 25010000002 FENTANYL CITRATE (PF) 100 MCG/2ML SOLUTION: Performed by: INTERNAL MEDICINE

## 2018-03-15 PROCEDURE — 0 IOPAMIDOL PER 1 ML: Performed by: INTERNAL MEDICINE

## 2018-03-15 PROCEDURE — 80053 COMPREHEN METABOLIC PANEL: CPT | Performed by: INTERNAL MEDICINE

## 2018-03-15 PROCEDURE — 85014 HEMATOCRIT: CPT

## 2018-03-15 PROCEDURE — 63710000001 INSULIN ASPART PER 5 UNITS: Performed by: INTERNAL MEDICINE

## 2018-03-15 PROCEDURE — 84100 ASSAY OF PHOSPHORUS: CPT | Performed by: INTERNAL MEDICINE

## 2018-03-15 PROCEDURE — 93320 DOPPLER ECHO COMPLETE: CPT | Performed by: INTERNAL MEDICINE

## 2018-03-15 PROCEDURE — 99231 SBSQ HOSP IP/OBS SF/LOW 25: CPT | Performed by: INTERNAL MEDICINE

## 2018-03-15 PROCEDURE — C1894 INTRO/SHEATH, NON-LASER: HCPCS | Performed by: INTERNAL MEDICINE

## 2018-03-15 PROCEDURE — C1769 GUIDE WIRE: HCPCS | Performed by: INTERNAL MEDICINE

## 2018-03-15 PROCEDURE — 99152 MOD SED SAME PHYS/QHP 5/>YRS: CPT

## 2018-03-15 PROCEDURE — 84439 ASSAY OF FREE THYROXINE: CPT | Performed by: INTERNAL MEDICINE

## 2018-03-15 RX ORDER — HYDROCODONE BITARTRATE AND ACETAMINOPHEN 5; 325 MG/1; MG/1
1 TABLET ORAL EVERY 4 HOURS PRN
Status: DISCONTINUED | OUTPATIENT
Start: 2018-03-15 | End: 2018-03-20 | Stop reason: HOSPADM

## 2018-03-15 RX ORDER — SODIUM CHLORIDE 9 MG/ML
125 INJECTION, SOLUTION INTRAVENOUS CONTINUOUS
Status: ACTIVE | OUTPATIENT
Start: 2018-03-15 | End: 2018-03-15

## 2018-03-15 RX ORDER — NICOTINE POLACRILEX 4 MG
15 LOZENGE BUCCAL
Status: DISCONTINUED | OUTPATIENT
Start: 2018-03-15 | End: 2018-03-20 | Stop reason: HOSPADM

## 2018-03-15 RX ORDER — ONDANSETRON 4 MG/1
4 TABLET, ORALLY DISINTEGRATING ORAL EVERY 6 HOURS PRN
Status: DISCONTINUED | OUTPATIENT
Start: 2018-03-15 | End: 2018-03-20 | Stop reason: HOSPADM

## 2018-03-15 RX ORDER — ONDANSETRON 4 MG/1
4 TABLET, FILM COATED ORAL EVERY 6 HOURS PRN
Status: DISCONTINUED | OUTPATIENT
Start: 2018-03-15 | End: 2018-03-20 | Stop reason: HOSPADM

## 2018-03-15 RX ORDER — ATORVASTATIN CALCIUM 20 MG/1
40 TABLET, FILM COATED ORAL DAILY
Status: DISCONTINUED | OUTPATIENT
Start: 2018-03-15 | End: 2018-03-15

## 2018-03-15 RX ORDER — METOPROLOL TARTRATE 5 MG/5ML
INJECTION INTRAVENOUS AS NEEDED
Status: DISCONTINUED | OUTPATIENT
Start: 2018-03-15 | End: 2018-03-15 | Stop reason: HOSPADM

## 2018-03-15 RX ORDER — METOPROLOL TARTRATE 50 MG/1
50 TABLET, FILM COATED ORAL EVERY 12 HOURS SCHEDULED
Status: DISCONTINUED | OUTPATIENT
Start: 2018-03-15 | End: 2018-03-16

## 2018-03-15 RX ORDER — FENTANYL CITRATE 50 UG/ML
INJECTION, SOLUTION INTRAMUSCULAR; INTRAVENOUS AS NEEDED
Status: DISCONTINUED | OUTPATIENT
Start: 2018-03-15 | End: 2018-03-15 | Stop reason: HOSPADM

## 2018-03-15 RX ORDER — VANCOMYCIN HYDROCHLORIDE 1 G/200ML
1000 INJECTION, SOLUTION INTRAVENOUS
Status: ACTIVE | OUTPATIENT
Start: 2018-03-16 | End: 2018-03-17

## 2018-03-15 RX ORDER — METOPROLOL TARTRATE 5 MG/5ML
INJECTION INTRAVENOUS
Status: DISPENSED
Start: 2018-03-15 | End: 2018-03-15

## 2018-03-15 RX ORDER — DEXTROSE MONOHYDRATE 25 G/50ML
25 INJECTION, SOLUTION INTRAVENOUS
Status: DISCONTINUED | OUTPATIENT
Start: 2018-03-15 | End: 2018-03-20 | Stop reason: HOSPADM

## 2018-03-15 RX ORDER — FENTANYL CITRATE 50 UG/ML
INJECTION, SOLUTION INTRAMUSCULAR; INTRAVENOUS
Status: COMPLETED | OUTPATIENT
Start: 2018-03-15 | End: 2018-03-15

## 2018-03-15 RX ORDER — MIDAZOLAM HYDROCHLORIDE 1 MG/ML
INJECTION INTRAMUSCULAR; INTRAVENOUS AS NEEDED
Status: DISCONTINUED | OUTPATIENT
Start: 2018-03-15 | End: 2018-03-15 | Stop reason: HOSPADM

## 2018-03-15 RX ORDER — DIGOXIN 0.25 MG/ML
250 INJECTION INTRAMUSCULAR; INTRAVENOUS ONCE
Status: COMPLETED | OUTPATIENT
Start: 2018-03-15 | End: 2018-03-15

## 2018-03-15 RX ORDER — LIDOCAINE HYDROCHLORIDE 20 MG/ML
INJECTION, SOLUTION INFILTRATION; PERINEURAL AS NEEDED
Status: DISCONTINUED | OUTPATIENT
Start: 2018-03-15 | End: 2018-03-15 | Stop reason: HOSPADM

## 2018-03-15 RX ORDER — NALOXONE HCL 0.4 MG/ML
0.4 VIAL (ML) INJECTION
Status: DISCONTINUED | OUTPATIENT
Start: 2018-03-15 | End: 2018-03-20 | Stop reason: HOSPADM

## 2018-03-15 RX ORDER — ONDANSETRON 2 MG/ML
4 INJECTION INTRAMUSCULAR; INTRAVENOUS EVERY 6 HOURS PRN
Status: DISCONTINUED | OUTPATIENT
Start: 2018-03-15 | End: 2018-03-20 | Stop reason: HOSPADM

## 2018-03-15 RX ORDER — MIDAZOLAM HYDROCHLORIDE 1 MG/ML
INJECTION INTRAMUSCULAR; INTRAVENOUS
Status: COMPLETED | OUTPATIENT
Start: 2018-03-15 | End: 2018-03-15

## 2018-03-15 RX ADMIN — FENTANYL CITRATE 25 MCG: 50 INJECTION INTRAMUSCULAR; INTRAVENOUS at 10:03

## 2018-03-15 RX ADMIN — INSULIN ASPART 8 UNITS: 100 INJECTION, SOLUTION INTRAVENOUS; SUBCUTANEOUS at 20:54

## 2018-03-15 RX ADMIN — Medication 2 MG: at 10:07

## 2018-03-15 RX ADMIN — METOPROLOL TARTRATE 5 MG: 5 INJECTION, SOLUTION INTRAVENOUS at 08:10

## 2018-03-15 RX ADMIN — DIGOXIN 250 MCG: 0.25 INJECTION INTRAMUSCULAR; INTRAVENOUS at 14:14

## 2018-03-15 RX ADMIN — ATORVASTATIN CALCIUM 40 MG: 20 TABLET, FILM COATED ORAL at 17:33

## 2018-03-15 RX ADMIN — Medication 2 MG: at 10:03

## 2018-03-15 RX ADMIN — LIDOCAINE HYDROCHLORIDE 10 ML: 20 SOLUTION ORAL; TOPICAL at 09:56

## 2018-03-15 RX ADMIN — METOPROLOL TARTRATE 50 MG: 25 TABLET ORAL at 12:37

## 2018-03-15 RX ADMIN — TAZOBACTAM SODIUM AND PIPERACILLIN SODIUM 3.38 G: 375; 3 INJECTION, SOLUTION INTRAVENOUS at 01:58

## 2018-03-15 RX ADMIN — METOPROLOL TARTRATE 5 MG: 5 INJECTION, SOLUTION INTRAVENOUS at 07:45

## 2018-03-15 RX ADMIN — Medication 2 MG: at 10:15

## 2018-03-15 RX ADMIN — INSULIN DETEMIR 15 UNITS: 100 INJECTION, SOLUTION SUBCUTANEOUS at 20:54

## 2018-03-15 RX ADMIN — METOPROLOL TARTRATE 5 MG: 5 INJECTION, SOLUTION INTRAVENOUS at 08:03

## 2018-03-15 RX ADMIN — INSULIN ASPART 8 UNITS: 100 INJECTION, SOLUTION INTRAVENOUS; SUBCUTANEOUS at 18:06

## 2018-03-15 RX ADMIN — FENTANYL CITRATE 25 MCG: 50 INJECTION INTRAMUSCULAR; INTRAVENOUS at 10:05

## 2018-03-15 RX ADMIN — SODIUM CHLORIDE 125 ML/HR: 9 INJECTION, SOLUTION INTRAVENOUS at 12:37

## 2018-03-15 RX ADMIN — ENOXAPARIN SODIUM 140 MG: 150 INJECTION SUBCUTANEOUS at 20:54

## 2018-03-15 RX ADMIN — Medication 2 MG: at 10:05

## 2018-03-15 RX ADMIN — FENTANYL CITRATE 25 MCG: 50 INJECTION INTRAMUSCULAR; INTRAVENOUS at 10:07

## 2018-03-15 RX ADMIN — TAZOBACTAM SODIUM AND PIPERACILLIN SODIUM 3.38 G: 375; 3 INJECTION, SOLUTION INTRAVENOUS at 17:34

## 2018-03-15 RX ADMIN — Medication 1 MG: at 10:11

## 2018-03-15 RX ADMIN — FENTANYL CITRATE 25 MCG: 50 INJECTION INTRAMUSCULAR; INTRAVENOUS at 10:10

## 2018-03-15 RX ADMIN — METOPROLOL TARTRATE 50 MG: 25 TABLET ORAL at 20:54

## 2018-03-15 RX ADMIN — INSULIN ASPART 8 UNITS: 100 INJECTION, SOLUTION INTRAVENOUS; SUBCUTANEOUS at 05:14

## 2018-03-15 RX ADMIN — INSULIN ASPART 12 UNITS: 100 INJECTION, SOLUTION INTRAVENOUS; SUBCUTANEOUS at 14:44

## 2018-03-15 RX ADMIN — Medication 1 MG: at 10:10

## 2018-03-15 NOTE — PLAN OF CARE
Problem: Patient Care Overview  Goal: Plan of Care Review   03/15/18 0608   OTHER   Outcome Summary Patient remains in CCU. Up to chair multiple times tonight, unable to get comfortable in bed or sleep. Complaints of chest pain from CPR/shocks. One very small BM tonight.   Coping/Psychosocial   Plan of Care Reviewed With patient   Plan of Care Review   Progress no change

## 2018-03-15 NOTE — CONSULTS
"Thank you for the referral.  Patient is post cath with no intervention.  I also have noted that patient has a diagnosis of severe cardiomyopathy.   Unfortunately according to the Medicare/Medicaid (CMS) guidelines in order to be eligible for Cardiac Rehab pts have to be \"stable, chronic HF as defined as pts with LVEF of 35% or less and NYHA Class II to IV symptoms despite being on optimal heart failure therapy for at least six weeks.  Stable pts are defined as pts who have not had recent (< or = 6 wks) or planned (<or = 6 mnths) major cardiovascular hospitalizations or procedures.\"  This means that we cannot enroll a HF pt in cardiac rehab if they are currently or recently hospitalized with a HF diagnosis.  It is unclear by patient's face sheet what insurance coverage he has if any, but a lot of insurance companies will follow Medicare/Medicaid guidelines.  Please refer this pt in the future when he meets the guidelines.    "

## 2018-03-15 NOTE — PROGRESS NOTES
[facility], [facility]  Inpatient Progress Note    Reason for Follow Up:  Diarrhea, elevated LFTs    Subjective     Interval History: Patient reports diarrhea has improved in terms of bowel consistency, has been in tests all morning, has yet to resume by mouth intake.      Current Facility-Administered Medications:   •  [MAR Hold] acetaminophen (TYLENOL) tablet 650 mg, 650 mg, Oral, Q6H PRN, Linda Blank MD, 650 mg at 03/14/18 1135  •  amiodarone (CORDARONE) injection, , Intravenous, Code / Trauma / Sedation Medication, Jimmy Rodrigez MD, 300 mg at 03/11/18 2327  •  [MAR Hold] aspirin EC tablet 81 mg, 81 mg, Oral, Daily, Valentin Moya MD, 81 mg at 03/14/18 0937  •  [MAR Hold] dextrose (D50W) solution 25 g, 25 g, Intravenous, Q15 Min PRN, Linda Blank MD  •  [MAR Hold] dextrose (GLUTOSE) oral gel 15 g, 15 g, Oral, Q15 Min PRN, Linda Blank MD  •  [MAR Hold] enoxaparin (LOVENOX) injection 140 mg, 1 mg/kg, Subcutaneous, Q12H, Valentin Moya MD, 140 mg at 03/14/18 2005  •  [MAR Hold] glucagon (human recombinant) (GLUCAGEN DIAGNOSTIC) injection 1 mg, 1 mg, Subcutaneous, PRN, Linda Blank MD  •  [MAR Hold] insulin aspart (novoLOG) injection 0-14 Units, 0-14 Units, Subcutaneous, Q4H, Linda Blank MD, 8 Units at 03/15/18 0514  •  [MAR Hold] loperamide (IMODIUM) capsule 2 mg, 2 mg, Oral, 4x Daily PRN, Linda Blank MD, 2 mg at 03/14/18 1021  •  magnesium sulfate injection, , , Code / Trauma / Sedation Medication, Jimmy Rodrigez MD, 2 g at 03/11/18 2334  •  metoprolol tartrate (LOPRESSOR) 5 MG/5ML injection  - ADS Override Pull, , , ,   •  metoprolol tartrate (LOPRESSOR) tablet 25 mg, 25 mg, Oral, Q12H, Valentin Moya MD, 25 mg at 03/14/18 2134  •  [MAR Hold] pantoprazole (PROTONIX) injection 40 mg, 40 mg, Intravenous, [facility]QUIS, Linda Blank MD, 40 mg at 03/14/18 0600  •  [MAR Hold] piperacillin-tazobactam (ZOSYN) 3.375 g in iso-osmotic dextrose 50 ml (premix), 3.375 g, Intravenous, Q8H,  Linda Blank MD, 3.375 g at 03/15/18 0158  •  Insert peripheral IV, , , Once **AND** [MAR Hold] sodium chloride 0.9 % flush 10 mL, 10 mL, Intravenous, PRN, Jimmy Rodrigez MD  •  sodium chloride 0.9 % infusion, , Intravenous, Code / Trauma / Sedation Continuous Med, Jimmy Rodrigez MD, Last Rate: 1,000 mL/hr at 03/11/18 2351, 1,000 mL/hr at 03/11/18 2351  •  sodium chloride 0.9 % infusion, 9 mL/hr, Intravenous, Continuous, Linda Blank MD, Last Rate: 9 mL/hr at 03/14/18 0830, 9 mL/hr at 03/14/18 0830  Review of Systems:    All systems were reviewed and negative except for:  Gastrointestinal: postitive for  See HPI    Objective     Vital Signs  Temp:  [97.5 °F (36.4 °C)-99.4 °F (37.4 °C)] 97.5 °F (36.4 °C)  Heart Rate:  [] 128  BP: (109-161)/() 118/80  Body mass index is 35.37 kg/m².    Intake/Output Summary (Last 24 hours) at 03/15/18 0817  Last data filed at 03/15/18 0600   Gross per 24 hour   Intake          1159.38 ml   Output             2350 ml   Net         -1190.62 ml     No intake/output data recorded.     Physical Exam:   General: patient awake, alert and cooperative   Eyes: Normal lids and lashes, no scleral icterus   Neck: supple, normal ROM   Skin: warm and dry, not jaundiced   Cardiovascular: regular rhythm and rate, no murmurs auscultated   Pulm: clear to auscultation bilaterally, regular and unlabored   Abdomen: soft, nontender, nondistended; normal bowel sounds   Rectal: deferred   Extremities: no rash or edema   Psychiatric: Normal mood and behavior; memory intact     Results Review:     I reviewed the patient's new clinical results.      Results from last 7 days  Lab Units 03/15/18  0511 03/14/18  0451 03/13/18  0409   WBC 10*3/mm3 12.16* 13.70* 16.17*   HEMOGLOBIN g/dL 12.9* 12.4* 14.3   HEMATOCRIT % 39.8* 38.2* 43.6   PLATELETS 10*3/mm3 190 197 240       Results from last 7 days  Lab Units 03/15/18  0511 03/14/18  0451 03/13/18  0409  03/12/18  0527   SODIUM mmol/L 141 144  143  < > 132*   POTASSIUM mmol/L 3.8 4.1 3.6  < > 7.0*   CHLORIDE mmol/L 100 107 105  < > 96*   CO2 mmol/L 20.1* 22.6 23.6  < > 17.4*   BUN mg/dL 27* 46* 43*  < > 32*   CREATININE mg/dL 1.28* 1.76* 1.94*  < > 2.25*   CALCIUM mg/dL 8.5* 8.0* 7.7*  < > 7.7*   BILIRUBIN mg/dL 1.3*  --  1.0  --  1.0   ALK PHOS U/L 66  --  55  --  96   ALT (SGPT) U/L 39  --  71*  --  140*   AST (SGOT) U/L 14  --  45*  --  180*   GLUCOSE mg/dL 289* 252* 140*  < > 544*   < > = values in this interval not displayed.    Results from last 7 days  Lab Units 03/12/18  0413 03/12/18  0019   INR  1.24* 1.29*  1.30*     No results found for: LIPASE    Radiology:  XR Chest Post CVA Port   Final Result       Right IJ catheter, no pneumothorax is seen, limited by supine   positioning. Endotracheal tube tip is 7.9 cm above the dottie.        This report was finalized on 3/12/2018 4:31 PM by Dr. Pato Terry MD.          XR Abdomen KUB   Final Result   NG tube as above        This report was finalized on 3/12/2018 4:24 AM by Roberto Carlos Yates MD.          XR Abdomen KUB   Final Result   Nasogastric tube as above        This report was finalized on 3/12/2018 3:07 AM by Roberto Carlos Yates MD.          CT Head Without Contrast   Final Result   1. No acute intracranial abnormality.                           This study was performed with techniques to keep radiation doses as low   as reasonably achievable (ALARA). Individualized dose reduction   techniques using automated exposure control or adjustment of mA and/or   kV according to the patient size were employed.        This report was finalized on 3/12/2018 1:17 AM by Roberto Carlos Yates MD.          XR Chest 1 View   Final Result   Patient intubated, findings of mild CHF with right effusion   noted           This report was finalized on 3/12/2018 12:21 AM by Roberto Carlos Yates MD.              Assessment/Plan     Patient Active Problem List   Diagnosis   • Cardiac arrest with ventricular fibrillation      Impression  #1 diarrhea: Improving  #2 elevated LFTs: Resolved    Recommendation  Monitor bowel function this patient resumes by mouth intake  Eventual GI assessment when his acute condition improves.  I discussed the patients findings and my recommendations with patient and nursing staff.    Leonard Kohli MD

## 2018-03-15 NOTE — PROGRESS NOTES
Chart was reviewed. Discussed w/ RN.  Met w/ patient who had family with him. He has had a cath today.  Patient requesting [facility] later as he is not up to a [facility] eval.

## 2018-03-15 NOTE — PROGRESS NOTES
"   LOS: 3 days    Patient Care Team:  JESUSITA Lam as PCP - General (Family Medicine)    Chief Complaint:    Chief Complaint   Patient presents with   • Cardiac Arrest     Follow UP SUHAIL  Subjective     Interval History:   Follow up SUHAIL .SP cardiac cath this am.     Review of Systems:   Thirsty, throat sore. Chest sore with movement. Stools slightly less.  Slept poorly. Urinating without honeycutt.     Objective     Vital Signs  Temp:  [97.5 °F (36.4 °C)-99.4 °F (37.4 °C)] 97.5 °F (36.4 °C)  Heart Rate:  [] 128  Resp:  [18-22] 18  BP: (109-161)/(69-98) 118/80    Flowsheet Rows    Flowsheet Row First Filed Value   Admission Height 200.7 cm (79\") Documented at 03/11/2018 2344   Admission Weight (!)  141 kg (310 lb) ['s license] Documented at 03/11/2018 2344          No intake/output data recorded.  I/O last 3 completed shifts:  In: 2733 [P.O.:720; I.V.:1863; IV Piggyback:150]  Out: 3225 [Urine:3225]    Intake/Output Summary (Last 24 hours) at 03/15/18 0930  Last data filed at 03/15/18 0600   Gross per 24 hour   Intake          1159.38 ml   Output             1900 ml   Net          -740.62 ml       Physical Exam:  Awake, alert. Oral mucosa dry. Heart Irreg, irreg, tachy. No s3 or rub.  Lungs clear to auscultation. Abd +bs soft, nontender. Ext 2+ lower ext edema.       Results Review:      Results from last 7 days  Lab Units 03/15/18  0511 03/14/18  0451 03/13/18  0409  03/12/18  0527   SODIUM mmol/L 141 144 143  < > 132*   POTASSIUM mmol/L 3.8 4.1 3.6  < > 7.0*   CHLORIDE mmol/L 100 107 105  < > 96*   CO2 mmol/L 20.1* 22.6 23.6  < > 17.4*   BUN mg/dL 27* 46* 43*  < > 32*   CREATININE mg/dL 1.28* 1.76* 1.94*  < > 2.25*   CALCIUM mg/dL 8.5* 8.0* 7.7*  < > 7.7*   BILIRUBIN mg/dL 1.3*  --  1.0  --  1.0   ALK PHOS U/L 66  --  55  --  96   ALT (SGPT) U/L 39  --  71*  --  140*   AST (SGOT) U/L 14  --  45*  --  180*   GLUCOSE mg/dL 289* 252* 140*  < > 544*   < > = values in this interval not " displayed.    Estimated Creatinine Clearance: 109.1 mL/min (by C-G formula based on SCr of 1.28 mg/dL (H)).      Results from last 7 days  Lab Units 03/15/18  0511 03/14/18  0451 03/13/18  0409   MAGNESIUM mg/dL 1.9 2.1 2.1   PHOSPHORUS mg/dL 2.8 2.9 4.3         Results from last 7 days  Lab Units 03/13/18  0409   URIC ACID mg/dL 6.0         Results from last 7 days  Lab Units 03/15/18  0511 03/14/18  0451 03/13/18  0409 03/12/18  1958 03/12/18  0413 03/12/18  0019   WBC 10*3/mm3 12.16* 13.70* 16.17*  --  27.27* 17.04*  16.06*   HEMOGLOBIN g/dL 12.9* 12.4* 14.3 15.5 16.7 15.3  15.6   PLATELETS 10*3/mm3 190 197 240  --  294 265  256         Results from last 7 days  Lab Units 03/12/18  0413 03/12/18  0019   INR  1.24* 1.29*  1.30*         Imaging Results (last 24 hours)     ** No results found for the last 24 hours. **          [MAR Hold] aspirin 81 mg Oral Daily   atorvastatin 40 mg Oral Daily   digoxin 250 mcg Intravenous Once   [MAR Hold] enoxaparin 1 mg/kg Subcutaneous Q12H   [MAR Hold] insulin aspart 0-14 Units Subcutaneous Q4H   metoprolol tartrate      metoprolol tartrate 25 mg Oral Q12H   metoprolol tartrate 50 mg Oral Q12H   [MAR Hold] pantoprazole 40 mg Intravenous QAM AC   [MAR Hold] piperacillin-tazobactam 3.375 g Intravenous Q8H       sodium chloride  Last Rate: 1,000 mL/hr (03/11/18 2351)   sodium chloride 9 mL/hr Last Rate: 9 mL/hr (03/14/18 0830)   sodium chloride 125 mL/hr        Medication Review:   Current Facility-Administered Medications   Medication Dose Route Frequency Provider Last Rate Last Dose   • [MAR Hold] acetaminophen (TYLENOL) tablet 650 mg  650 mg Oral Q6H PRN Linda Blank MD   650 mg at 03/14/18 1135   • amiodarone (CORDARONE) injection   Intravenous Code / Trauma / Sedation Medication Jimmy Rodrigez MD   300 mg at 03/11/18 5347   • [MAR Hold] aspirin EC tablet 81 mg  81 mg Oral Daily Valentin Moya MD   81 mg at 03/14/18 0942   • atorvastatin (LIPITOR) tablet 40 mg  40  mg Oral Daily Valentin Moya MD       • [MAR Hold] dextrose (D50W) solution 25 g  25 g Intravenous Q15 Min PRN Linda Blank MD       • [MAR Hold] dextrose (GLUTOSE) oral gel 15 g  15 g Oral Q15 Min PRN Linda Blank MD       • digoxin (LANOXIN) injection 250 mcg  250 mcg Intravenous Once Valentin Moya MD       • [MAR Hold] enoxaparin (LOVENOX) injection 140 mg  1 mg/kg Subcutaneous Q12H Valentin Moya MD   140 mg at 03/14/18 2005   • [MAR Hold] glucagon (human recombinant) (GLUCAGEN DIAGNOSTIC) injection 1 mg  1 mg Subcutaneous PRN Linda Blank MD       • HYDROcodone-acetaminophen (NORCO) 5-325 MG per tablet 1 tablet  1 tablet Oral Q4H PRN Valentin Moya MD       • [MAR Hold] insulin aspart (novoLOG) injection 0-14 Units  0-14 Units Subcutaneous Q4H Linda Blank MD   8 Units at 03/15/18 0514   • [MAR Hold] loperamide (IMODIUM) capsule 2 mg  2 mg Oral 4x Daily PRN Linda Blank MD   2 mg at 03/14/18 1021   • magnesium sulfate injection    Code / Trauma / Sedation Medication Jimmy Rodrigez MD   2 g at 03/11/18 2334   • metoprolol tartrate (LOPRESSOR) 5 MG/5ML injection  - ADS Override Pull            • metoprolol tartrate (LOPRESSOR) tablet 25 mg  25 mg Oral Q12H Valentin Moya MD   25 mg at 03/14/18 2134   • metoprolol tartrate (LOPRESSOR) tablet 50 mg  50 mg Oral Q12H Valentin Moya MD       • morphine injection 1 mg  1 mg Intravenous Q4H PRN Valentin Moya MD        And   • naloxone (NARCAN) injection 0.4 mg  0.4 mg Intravenous Q5 Min PRN Valentin Moya MD       • ondansetron (ZOFRAN) tablet 4 mg  4 mg Oral Q6H PRN Valentin Moya MD        Or   • ondansetron ODT (ZOFRAN-ODT) disintegrating tablet 4 mg  4 mg Oral Q6H PRN Valentin Moya MD        Or   • ondansetron (ZOFRAN) injection 4 mg  4 mg Intravenous Q6H PRN Valentin Moya MD       • [MAR Hold] pantoprazole (PROTONIX) injection 40 mg  40 mg Intravenous QAM  Linda Blank MD   40 mg at 03/14/18 0633   • [MAR Hold]  piperacillin-tazobactam (ZOSYN) 3.375 g in iso-osmotic dextrose 50 ml (premix)  3.375 g Intravenous Q8H Linda Blank MD   3.375 g at 03/15/18 0158   • [MAR Hold] sodium chloride 0.9 % flush 10 mL  10 mL Intravenous PRN Jimmy Rodrigez MD       • sodium chloride 0.9 % infusion   Intravenous Code / Trauma / Sedation Continuous Med Jimmy Rodrigez MD 1,000 mL/hr at 03/11/18 2351 1,000 mL/hr at 03/11/18 2351   • sodium chloride 0.9 % infusion  9 mL/hr Intravenous Continuous Linda Blank MD 9 mL/hr at 03/14/18 0830 9 mL/hr at 03/14/18 0830   • sodium chloride 0.9 % infusion  125 mL/hr Intravenous Continuous Valentin Moya MD           Assessment/Plan   1. . Non-oliguric SUHAIL  associated with  cardiac arrest.  No baseline renal function available at this time. Cr is improved. Now sp cardiac cath. IVF post cath. He has significant edema, but measured PCW 10, RA pressure 6.  Check Thyroid function. Albumin 3.2.  2.  SP Vfib arrest , resuscitated. Severe non-ischemic cardiomyopathy by cath.  Will likely need AICD. Plan for ARIA today.   3.  Polysubstance abuse, cocaine  4.  DM2 SSI for now. Blood sugars not controlled. He was on Lantus at home 29 units HS.    5.  Atrial fibrillation with RVR.  Beta blockers being managed by cardiology.  On cardizem drip. Check thyroid function  6. Metabolic acidosis. Elevated anion gap. Recheck later today.   7. Diarrhea being evaluated by GI.     Dw family at bedside.     Rachael Olivo MD  03/15/18  9:30 AM

## 2018-03-15 NOTE — PROGRESS NOTES
"[facility]         Dr Mckeon Sayied   LOS: 3 days   Patient Care Team:  JESUSITA Lam as PCP - General (Family Medicine)    Chief Complaint: Resuscitated V. fib arrest in the setting of positive drug screen.  Persistent lactic acidosis and acute kidney injury and hyperkalemia.    Interval History: Status post cardiac catheter.  Awake alert following simple commands.  No specific complaints reported.    REVIEW OF SYSTEMS:   No chest pain no shortness of breath.      Vent none at this time      Vital Signs  Temp:  [97.5 °F (36.4 °C)] 97.5 °F (36.4 °C)  Heart Rate:  [] 105  Resp:  [14-22] 18  BP: ()/(65-96) 97/74    Intake/Output Summary (Last 24 hours) at 03/15/18 1200  Last data filed at 03/15/18 0600   Gross per 24 hour   Intake           439.38 ml   Output             1400 ml   Net          -960.62 ml     Flowsheet Rows    Flowsheet Row First Filed Value   Admission Height 200.7 cm (79\") Documented at 03/11/2018 2344   Admission Weight (!)  141 kg (310 lb) ['s license] Documented at 03/11/2018 2344          Physical Exam:   General Appearance:    Awake alert no distress.  No issues reported.     Lungs:     Equal breath sounds with rhonchi and crackles on the bases.      Heart:    Regular rhythm and normal rate, normal S1 and S2, no            murmur, no gallop, no rub, no click   Chest Wall:    No abnormalities observed   Abdomen:    Soft mildly distended.  Diffuse tenderness no rebound or guarding.     Extremities:   Moves all extremities well, 1+ edema, no cyanosis, no             redness     Results Review:          Results from last 7 days  Lab Units 03/15/18  0511 03/14/18  0451 03/13/18  0409   SODIUM mmol/L 141 144 143   POTASSIUM mmol/L 3.8 4.1 3.6   CHLORIDE mmol/L 100 107 105   CO2 mmol/L 20.1* 22.6 23.6   BUN mg/dL 27* 46* 43*   CREATININE mg/dL 1.28* 1.76* 1.94*   GLUCOSE mg/dL 289* 252* 140*   CALCIUM mg/dL 8.5* 8.0* 7.7*       Results from last 7 " days  Lab Units 03/13/18  0409 03/12/18  0019   TROPONIN T ng/mL 0.131* <0.010       Results from last 7 days  Lab Units 03/15/18  0845 03/15/18  0844 03/15/18  0511 03/14/18  0451 03/13/18  0409   WBC 10*3/mm3  --   --  12.16* 13.70* 16.17*   HEMOGLOBIN g/dL  --   --  12.9* 12.4* 14.3   HEMOGLOBIN, POC g/dL 12.2 11.9*  --   --   --    HEMATOCRIT %  --   --  39.8* 38.2* 43.6   HEMATOCRIT POC % 36* 35*  --   --   --    PLATELETS 10*3/mm3  --   --  190 197 240       Results from last 7 days  Lab Units 03/12/18  0413 03/12/18  0019   INR  1.24* 1.29*  1.30*   APTT seconds  --  35.6*  35.4           Results from last 7 days  Lab Units 03/15/18  0511   MAGNESIUM mg/dL 1.9           Results from last 7 days  Lab Units 03/13/18  1132   PH, ARTERIAL pH units 7.417   PO2 ART mm Hg 74.0*   PCO2, ARTERIAL mm Hg 35.0   HCO3 ART mmol/L 22.6       I reviewed the patient's new clinical results.  I personally viewed and interpreted the patient's CXR        Medication Review:     [MAR Hold] aspirin 81 mg Oral Daily   atorvastatin 40 mg Oral Daily   digoxin 250 mcg Intravenous Once   [MAR Hold] enoxaparin 1 mg/kg Subcutaneous Q12H   [MAR Hold] insulin aspart 0-14 Units Subcutaneous Q4H   insulin detemir 15 Units Subcutaneous Nightly   metoprolol tartrate      metoprolol tartrate 25 mg Oral Q12H   metoprolol tartrate 50 mg Oral Q12H   [MAR Hold] pantoprazole 40 mg Intravenous QAM AC   [MAR Hold] piperacillin-tazobactam 3.375 g Intravenous Q8H         sodium chloride 9 mL/hr Last Rate: 9 mL/hr (03/14/18 0830)   sodium chloride 125 mL/hr        ASSESSMENT:   Resuscitated V. fib arrest  Severe nonischemic cardiomyopathy  Acute respiratory failure, mechanical ventilation  Aspiration pneumonia  Sinus tachycardia  Atrial fibrillation presumed new onset  Positive UDS  Acute kidney injury with severe hyperkalemia  Severe lactic acidosis  Possible GI bleed    PLAN:  Status post cardiac catheter.  Noted nonischemic cardiomyopathy.  Plans for  cardiology.  Improving leukocytosis and lactic acidosis now   IV fluids per renal  Remains on full dose anticoagulation per cardiology.   Wean oxygen as tolerated.  Continue Zosyn for possible aspiration pneumonia  Hemoglobin stable.  Discussed with family  Okay to transfer out of the ICU once cleared by cardiology.      Linda Blank MD  03/15/18  12:00 PM

## 2018-03-16 LAB
ALBUMIN SERPL-MCNC: 3.4 G/DL (ref 3.5–5.2)
ALBUMIN/GLOB SERPL: 1.2 G/DL
ALP SERPL-CCNC: 73 U/L (ref 39–117)
ALT SERPL W P-5'-P-CCNC: 31 U/L (ref 1–41)
ANION GAP SERPL CALCULATED.3IONS-SCNC: 16 MMOL/L
AST SERPL-CCNC: 12 U/L (ref 1–40)
BILIRUB SERPL-MCNC: 0.9 MG/DL (ref 0.1–1.2)
BUN BLD-MCNC: 21 MG/DL (ref 6–20)
BUN/CREAT SERPL: 15.9 (ref 7–25)
CALCIUM SPEC-SCNC: 8.9 MG/DL (ref 8.6–10.5)
CHLORIDE SERPL-SCNC: 100 MMOL/L (ref 98–107)
CO2 SERPL-SCNC: 22 MMOL/L (ref 22–29)
CREAT BLD-MCNC: 1.32 MG/DL (ref 0.76–1.27)
DEPRECATED RDW RBC AUTO: 49.4 FL (ref 37–54)
ERYTHROCYTE [DISTWIDTH] IN BLOOD BY AUTOMATED COUNT: 13.1 % (ref 11.5–14.5)
GFR SERPL CREATININE-BSD FRML MDRD: 57 ML/MIN/1.73
GLOBULIN UR ELPH-MCNC: 2.8 GM/DL
GLUCOSE BLD-MCNC: 251 MG/DL (ref 65–99)
GLUCOSE BLDC GLUCOMTR-MCNC: 208 MG/DL (ref 70–130)
GLUCOSE BLDC GLUCOMTR-MCNC: 211 MG/DL (ref 70–130)
GLUCOSE BLDC GLUCOMTR-MCNC: 226 MG/DL (ref 70–130)
GLUCOSE BLDC GLUCOMTR-MCNC: 244 MG/DL (ref 70–130)
GLUCOSE BLDC GLUCOMTR-MCNC: 302 MG/DL (ref 70–130)
HCT VFR BLD AUTO: 40 % (ref 40.4–52.2)
HGB BLD-MCNC: 12.9 G/DL (ref 13.7–17.6)
MCH RBC QN AUTO: 33 PG (ref 27–32.7)
MCHC RBC AUTO-ENTMCNC: 32.3 G/DL (ref 32.6–36.4)
MCV RBC AUTO: 102.3 FL (ref 79.8–96.2)
PHOSPHATE SERPL-MCNC: 3.6 MG/DL (ref 2.5–4.5)
PLATELET # BLD AUTO: 210 10*3/MM3 (ref 140–500)
PMV BLD AUTO: 10.2 FL (ref 6–12)
POTASSIUM BLD-SCNC: 3.8 MMOL/L (ref 3.5–5.2)
PROT SERPL-MCNC: 6.2 G/DL (ref 6–8.5)
RBC # BLD AUTO: 3.91 10*6/MM3 (ref 4.6–6)
SODIUM BLD-SCNC: 138 MMOL/L (ref 136–145)
VIT B12 BLD-MCNC: 933 PG/ML (ref 211–946)
WBC NRBC COR # BLD: 11.5 10*3/MM3 (ref 4.5–10.7)

## 2018-03-16 PROCEDURE — 82747 ASSAY OF FOLIC ACID RBC: CPT | Performed by: INTERNAL MEDICINE

## 2018-03-16 PROCEDURE — 99152 MOD SED SAME PHYS/QHP 5/>YRS: CPT | Performed by: INTERNAL MEDICINE

## 2018-03-16 PROCEDURE — 99231 SBSQ HOSP IP/OBS SF/LOW 25: CPT | Performed by: INTERNAL MEDICINE

## 2018-03-16 PROCEDURE — 63710000001 INSULIN ASPART PER 5 UNITS: Performed by: INTERNAL MEDICINE

## 2018-03-16 PROCEDURE — 0JH60PZ INSERTION OF CARDIAC RHYTHM RELATED DEVICE INTO CHEST SUBCUTANEOUS TISSUE AND FASCIA, OPEN APPROACH: ICD-10-PCS | Performed by: INTERNAL MEDICINE

## 2018-03-16 PROCEDURE — C1777 LEAD, AICD, ENDO SINGLE COIL: HCPCS | Performed by: INTERNAL MEDICINE

## 2018-03-16 PROCEDURE — 84100 ASSAY OF PHOSPHORUS: CPT | Performed by: INTERNAL MEDICINE

## 2018-03-16 PROCEDURE — 80053 COMPREHEN METABOLIC PANEL: CPT | Performed by: INTERNAL MEDICINE

## 2018-03-16 PROCEDURE — C1894 INTRO/SHEATH, NON-LASER: HCPCS | Performed by: INTERNAL MEDICINE

## 2018-03-16 PROCEDURE — 99233 SBSQ HOSP IP/OBS HIGH 50: CPT | Performed by: INTERNAL MEDICINE

## 2018-03-16 PROCEDURE — 82962 GLUCOSE BLOOD TEST: CPT

## 2018-03-16 PROCEDURE — 99153 MOD SED SAME PHYS/QHP EA: CPT | Performed by: INTERNAL MEDICINE

## 2018-03-16 PROCEDURE — 0JH608Z INSERTION OF DEFIBRILLATOR GENERATOR INTO CHEST SUBCUTANEOUS TISSUE AND FASCIA, OPEN APPROACH: ICD-10-PCS | Performed by: INTERNAL MEDICINE

## 2018-03-16 PROCEDURE — 33249 INSJ/RPLCMT DEFIB W/LEAD(S): CPT | Performed by: INTERNAL MEDICINE

## 2018-03-16 PROCEDURE — 63710000001 INSULIN DETEMER PER 5 UNITS: Performed by: INTERNAL MEDICINE

## 2018-03-16 PROCEDURE — 25010000002 VANCOMYCIN PER 500 MG: Performed by: INTERNAL MEDICINE

## 2018-03-16 PROCEDURE — 82607 VITAMIN B-12: CPT | Performed by: INTERNAL MEDICINE

## 2018-03-16 PROCEDURE — 85014 HEMATOCRIT: CPT | Performed by: INTERNAL MEDICINE

## 2018-03-16 PROCEDURE — 25010000002 PIPERACILLIN SOD-TAZOBACTAM PER 1 G: Performed by: INTERNAL MEDICINE

## 2018-03-16 PROCEDURE — C1722 AICD, SINGLE CHAMBER: HCPCS | Performed by: INTERNAL MEDICINE

## 2018-03-16 PROCEDURE — 85027 COMPLETE CBC AUTOMATED: CPT | Performed by: INTERNAL MEDICINE

## 2018-03-16 PROCEDURE — 25010000002 MIDAZOLAM PER 1 MG: Performed by: INTERNAL MEDICINE

## 2018-03-16 PROCEDURE — 25010000002 FENTANYL CITRATE (PF) 100 MCG/2ML SOLUTION: Performed by: INTERNAL MEDICINE

## 2018-03-16 DEVICE — IMPLANTABLE CARDIOVERTER DEFIBRILLATOR VR
Type: IMPLANTABLE DEVICE | Status: FUNCTIONAL
Brand: DYNAGEN™ EL ICD VR

## 2018-03-16 DEVICE — STEROID-ELUTING BIPOLAR PACE/SENSE AND DEFIBRILLATION LEAD
Type: IMPLANTABLE DEVICE | Status: FUNCTIONAL
Brand: ENDOTAK RELIANCE® SG

## 2018-03-16 RX ORDER — VANCOMYCIN HYDROCHLORIDE 1 G/200ML
INJECTION, SOLUTION INTRAVENOUS CONTINUOUS PRN
Status: DISCONTINUED | OUTPATIENT
Start: 2018-03-16 | End: 2018-03-16 | Stop reason: HOSPADM

## 2018-03-16 RX ORDER — FENTANYL CITRATE 50 UG/ML
INJECTION, SOLUTION INTRAMUSCULAR; INTRAVENOUS AS NEEDED
Status: DISCONTINUED | OUTPATIENT
Start: 2018-03-16 | End: 2018-03-16 | Stop reason: HOSPADM

## 2018-03-16 RX ORDER — ONDANSETRON 2 MG/ML
4 INJECTION INTRAMUSCULAR; INTRAVENOUS EVERY 6 HOURS PRN
Status: DISCONTINUED | OUTPATIENT
Start: 2018-03-16 | End: 2018-03-20 | Stop reason: HOSPADM

## 2018-03-16 RX ORDER — SODIUM CHLORIDE 0.9 % (FLUSH) 0.9 %
1-10 SYRINGE (ML) INJECTION AS NEEDED
Status: DISCONTINUED | OUTPATIENT
Start: 2018-03-16 | End: 2018-03-20 | Stop reason: HOSPADM

## 2018-03-16 RX ORDER — SODIUM CHLORIDE 9 MG/ML
INJECTION, SOLUTION INTRAVENOUS CONTINUOUS PRN
Status: DISCONTINUED | OUTPATIENT
Start: 2018-03-16 | End: 2018-03-16 | Stop reason: HOSPADM

## 2018-03-16 RX ORDER — HYDROMORPHONE HYDROCHLORIDE 1 MG/ML
0.5 INJECTION, SOLUTION INTRAMUSCULAR; INTRAVENOUS; SUBCUTANEOUS
Status: DISCONTINUED | OUTPATIENT
Start: 2018-03-16 | End: 2018-03-20 | Stop reason: HOSPADM

## 2018-03-16 RX ORDER — NALOXONE HCL 0.4 MG/ML
0.4 VIAL (ML) INJECTION
Status: DISCONTINUED | OUTPATIENT
Start: 2018-03-16 | End: 2018-03-20 | Stop reason: HOSPADM

## 2018-03-16 RX ORDER — METOPROLOL TARTRATE 50 MG/1
50 TABLET, FILM COATED ORAL EVERY 8 HOURS SCHEDULED
Status: DISCONTINUED | OUTPATIENT
Start: 2018-03-16 | End: 2018-03-17

## 2018-03-16 RX ORDER — LIDOCAINE HYDROCHLORIDE AND EPINEPHRINE 10; 10 MG/ML; UG/ML
INJECTION, SOLUTION INFILTRATION; PERINEURAL AS NEEDED
Status: DISCONTINUED | OUTPATIENT
Start: 2018-03-16 | End: 2018-03-16 | Stop reason: HOSPADM

## 2018-03-16 RX ORDER — MIDAZOLAM HYDROCHLORIDE 1 MG/ML
INJECTION INTRAMUSCULAR; INTRAVENOUS AS NEEDED
Status: DISCONTINUED | OUTPATIENT
Start: 2018-03-16 | End: 2018-03-16 | Stop reason: HOSPADM

## 2018-03-16 RX ORDER — PANTOPRAZOLE SODIUM 40 MG/1
40 TABLET, DELAYED RELEASE ORAL
Status: DISCONTINUED | OUTPATIENT
Start: 2018-03-17 | End: 2018-03-20 | Stop reason: HOSPADM

## 2018-03-16 RX ORDER — METOPROLOL TARTRATE 5 MG/5ML
INJECTION INTRAVENOUS AS NEEDED
Status: DISCONTINUED | OUTPATIENT
Start: 2018-03-16 | End: 2018-03-16 | Stop reason: HOSPADM

## 2018-03-16 RX ORDER — LISINOPRIL 2.5 MG/1
2.5 TABLET ORAL
Status: DISCONTINUED | OUTPATIENT
Start: 2018-03-16 | End: 2018-03-20

## 2018-03-16 RX ORDER — WARFARIN SODIUM 5 MG/1
5 TABLET ORAL
Status: DISCONTINUED | OUTPATIENT
Start: 2018-03-16 | End: 2018-03-18

## 2018-03-16 RX ADMIN — METOPROLOL TARTRATE 5 MG: 5 INJECTION, SOLUTION INTRAVENOUS at 09:49

## 2018-03-16 RX ADMIN — TAZOBACTAM SODIUM AND PIPERACILLIN SODIUM 3.38 G: 375; 3 INJECTION, SOLUTION INTRAVENOUS at 18:06

## 2018-03-16 RX ADMIN — METOPROLOL TARTRATE 5 MG: 5 INJECTION, SOLUTION INTRAVENOUS at 09:57

## 2018-03-16 RX ADMIN — LISINOPRIL 2.5 MG: 2.5 TABLET ORAL at 11:50

## 2018-03-16 RX ADMIN — INSULIN DETEMIR 15 UNITS: 100 INJECTION, SOLUTION SUBCUTANEOUS at 21:23

## 2018-03-16 RX ADMIN — INSULIN ASPART 3 UNITS: 100 INJECTION, SOLUTION INTRAVENOUS; SUBCUTANEOUS at 00:35

## 2018-03-16 RX ADMIN — PANTOPRAZOLE SODIUM 40 MG: 40 INJECTION, POWDER, FOR SOLUTION INTRAVENOUS at 08:20

## 2018-03-16 RX ADMIN — INSULIN ASPART 5 UNITS: 100 INJECTION, SOLUTION INTRAVENOUS; SUBCUTANEOUS at 12:51

## 2018-03-16 RX ADMIN — INSULIN ASPART 5 UNITS: 100 INJECTION, SOLUTION INTRAVENOUS; SUBCUTANEOUS at 05:11

## 2018-03-16 RX ADMIN — HYDROCODONE BITARTRATE AND ACETAMINOPHEN 1 TABLET: 5; 325 TABLET ORAL at 21:23

## 2018-03-16 RX ADMIN — TAZOBACTAM SODIUM AND PIPERACILLIN SODIUM 3.38 G: 375; 3 INJECTION, SOLUTION INTRAVENOUS at 09:18

## 2018-03-16 RX ADMIN — METOPROLOL TARTRATE 50 MG: 50 TABLET, FILM COATED ORAL at 18:06

## 2018-03-16 RX ADMIN — WARFARIN SODIUM 5 MG: 5 TABLET ORAL at 18:07

## 2018-03-16 RX ADMIN — METOPROLOL TARTRATE 5 MG: 5 INJECTION, SOLUTION INTRAVENOUS at 10:03

## 2018-03-16 RX ADMIN — TAZOBACTAM SODIUM AND PIPERACILLIN SODIUM 3.38 G: 375; 3 INJECTION, SOLUTION INTRAVENOUS at 02:26

## 2018-03-16 RX ADMIN — METOPROLOL TARTRATE 50 MG: 25 TABLET ORAL at 08:47

## 2018-03-16 RX ADMIN — INSULIN ASPART 5 UNITS: 100 INJECTION, SOLUTION INTRAVENOUS; SUBCUTANEOUS at 08:53

## 2018-03-16 RX ADMIN — INSULIN ASPART 10 UNITS: 100 INJECTION, SOLUTION INTRAVENOUS; SUBCUTANEOUS at 21:25

## 2018-03-16 RX ADMIN — METOPROLOL TARTRATE 50 MG: 50 TABLET, FILM COATED ORAL at 21:22

## 2018-03-16 RX ADMIN — INSULIN ASPART 5 UNITS: 100 INJECTION, SOLUTION INTRAVENOUS; SUBCUTANEOUS at 18:06

## 2018-03-16 NOTE — PROGRESS NOTES
"Shannan DOYLE Blane  1965 52 y.o.  [id number]      Patient Care Team:  JESUSITA Lam as PCP - General (Family Medicine)    CC: Cardiac arrest, severe cardiomyopathy, diabetes, hypertension, cocaine abuse    Interval History: He is doing well no further arrhythmia    Objective   Vital Signs  Temp:  [97.5 °F (36.4 °C)-97.6 °F (36.4 °C)] 97.6 °F (36.4 °C)  Heart Rate:  [] 109  Resp:  [14-20] 20  BP: ()/() 134/85    Intake/Output Summary (Last 24 hours) at 03/16/18 0938  Last data filed at 03/16/18 0500   Gross per 24 hour   Intake          1988.07 ml   Output             2900 ml   Net          -911.93 ml     Flowsheet Rows    Flowsheet Row First Filed Value   Admission Height 200.7 cm (79\") Documented at 03/11/2018 2344   Admission Weight (!)  141 kg (310 lb) ['s license] Documented at 03/11/2018 2344          Physical Exam:   General Appearance:    Alert,oriented, in no acute distress   Lungs:     Clear to auscultation,BS are equal    Heart:    Normal S1 and S2, iRRR without murmur, gallop or rub   HEENT:    Sclera are clear, no JVD or adenopathy   Abdomen:     Normal bowel sounds, soft non-tender, non-distended, no HSM   Extremities:   Moves all extremities well, no edema, no cyanosis, no             Redness, no rash     Medication Review:        insulin aspart 0-14 Units Subcutaneous Q4H   insulin detemir 15 Units Subcutaneous Nightly   lisinopril 2.5 mg Oral Q24H   metoprolol tartrate 5 mg Intravenous Q5 Min   metoprolol tartrate 50 mg Oral TID   pantoprazole 40 mg Intravenous QAM AC   piperacillin-tazobactam 3.375 g Intravenous Q8H   vancomycin 1,000 mg Intravenous On Call to OR       sodium chloride 9 mL/hr Last Rate: 9 mL/hr (03/14/18 0830)         I reviewed the patient's new clinical results.  I personally viewed and interpreted the patient's EKG/Telemetry data    Assessment/Plan  Active Hospital Problems (** Indicates Principal Problem)    Diagnosis Date Noted   • Cardiac " arrest with ventricular fibrillation [I46.9, I49.01] 03/12/2018      Resolved Hospital Problems    Diagnosis Date Noted Date Resolved   No resolved problems to display.       Severe cardiomyopathy and history of cocaine ingestion diabetes hypertension.  He's actually done very well from a cardiac arrest standpoint.  He is in A. fib we'll try and slow him down with beta blocker.  A minimal increase his beta blocker today he is given ago for an ICD and then we will need to anticoagulate him a note to add a low-dose of an ACE inhibitor I'm using metoprolol tartrate doesn't try to get more rate control ideally he would be on carvedilol but I'm afraid it'll drop his blood pressure too much and I won't be able to control his rate.  Could consider digoxin for him.  He may be able to go home over the weekend       Valentin Moya MD  03/16/18  9:38 AM

## 2018-03-16 NOTE — PLAN OF CARE
Problem: Patient Care Overview  Goal: Plan of Care Review   03/16/18 0519   OTHER   Outcome Summary Pt up in chair most of the night d/t discomfort in bed. Complaining of pain in chest, does not want pain medicine. Educated on need to cough and to request pain medicine if it hurts too much to cough/deep breathe. VSS. Consent in chart for AICD placement.   Coping/Psychosocial   Plan of Care Reviewed With patient   Plan of Care Review   Progress no change

## 2018-03-16 NOTE — PROGRESS NOTES
[facility]  Inpatient Progress Note    Reason for Follow Up: Diarrhea      Subjective     Interval History:   Patient up in chair at bedside, denies any further diarrhea.  Discussed previous colonoscopic evaluation approximately 10 years ago.  Advised would offer colonoscopy but in the outpatient setting once his acute problems have resolved.    Current Facility-Administered Medications:   •  acetaminophen (TYLENOL) tablet 650 mg, 650 mg, Oral, Q6H PRN, Linda Blank MD, 650 mg at 03/14/18 1135  •  dextrose (D50W) solution 25 g, 25 g, Intravenous, Q15 Min PRN, Linda Blank MD  •  dextrose (D50W) solution 25 g, 25 g, Intravenous, Q15 Min PRN, Linda Blank MD  •  dextrose (GLUTOSE) oral gel 15 g, 15 g, Oral, Q15 Min PRN, Linda Blank MD  •  dextrose (GLUTOSE) oral gel 15 g, 15 g, Oral, Q15 Min PRN, Linda Blank MD  •  enoxaparin (LOVENOX) injection 140 mg, 1 mg/kg, Subcutaneous, Q12H, Robert Jauregui MD, 140 mg at 03/15/18 2054  •  glucagon (human recombinant) (GLUCAGEN DIAGNOSTIC) injection 1 mg, 1 mg, Subcutaneous, PRN, Linda Blank MD  •  glucagon (human recombinant) (GLUCAGEN DIAGNOSTIC) injection 1 mg, 1 mg, Subcutaneous, PRN, Linda Blank MD  •  HYDROcodone-acetaminophen (NORCO) 5-325 MG per tablet 1 tablet, 1 tablet, Oral, Q4H PRN, Valentin Moya MD  •  insulin aspart (novoLOG) injection 0-14 Units, 0-14 Units, Subcutaneous, Q4H, Linda Blank MD, 5 Units at 03/16/18 0511  •  insulin detemir (LEVEMIR) injection 15 Units, 15 Units, Subcutaneous, Nightly, Linda Blank MD, 15 Units at 03/15/18 2054  •  loperamide (IMODIUM) capsule 2 mg, 2 mg, Oral, 4x Daily PRN, Linda Blank MD, 2 mg at 03/14/18 1021  •  metoprolol tartrate (LOPRESSOR) tablet 50 mg, 50 mg, Oral, Q12H, Valentin Moya MD, 50 mg at 03/15/18 2054  •  morphine injection 1 mg, 1 mg, Intravenous, Q4H PRN **AND** naloxone (NARCAN) injection 0.4 mg, 0.4 mg, Intravenous, Q5 Min PRN, Valentin Moya MD  •   ondansetron (ZOFRAN) tablet 4 mg, 4 mg, Oral, Q6H PRN **OR** ondansetron ODT (ZOFRAN-ODT) disintegrating tablet 4 mg, 4 mg, Oral, Q6H PRN **OR** ondansetron (ZOFRAN) injection 4 mg, 4 mg, Intravenous, Q6H PRN, Valentin Moya MD  •  pantoprazole (PROTONIX) injection 40 mg, 40 mg, Intravenous, QAM AC, Linda Blank MD, 40 mg at 03/14/18 0633  •  piperacillin-tazobactam (ZOSYN) 3.375 g in iso-osmotic dextrose 50 ml (premix), 3.375 g, Intravenous, Q8H, Linda Blank MD, 3.375 g at 03/16/18 0226  •  Insert peripheral IV, , , Once **AND** sodium chloride 0.9 % flush 10 mL, 10 mL, Intravenous, PRN, Jimmy Rodrigez MD  •  sodium chloride 0.9 % infusion, 9 mL/hr, Intravenous, Continuous, Linda Blank MD, Last Rate: 9 mL/hr at 03/14/18 0830, 9 mL/hr at 03/14/18 0830  •  vancomycin (VANCOCIN) in iso-osmotic dextrose IVPB 1 g (premix) 200 mL, 1,000 mg, Intravenous, On Call to OR, JESUSITA Miller  Review of Systems:    All systems were reviewed and negative except for:  Gastrointestinal: postitive for  See HPI    Objective     Vital Signs  Temp:  [97.5 °F (36.4 °C)] 97.5 °F (36.4 °C)  Heart Rate:  [] 104  Resp:  [14-22] 18  BP: ()/() 141/98  Body mass index is 35.37 kg/m².    Intake/Output Summary (Last 24 hours) at 03/16/18 0818  Last data filed at 03/16/18 0500   Gross per 24 hour   Intake          1988.07 ml   Output             2900 ml   Net          -911.93 ml     No intake/output data recorded.     Physical Exam:   General: patient awake, alert and cooperative   Eyes: Normal lids and lashes, no scleral icterus   Neck: supple, normal ROM   Skin: warm and dry, not jaundiced   Cardiovascular: regular rhythm and rate, no murmurs auscultated   Pulm: clear to auscultation bilaterally, regular and unlabored   Abdomen: soft, nontender, nondistended; normal bowel sounds   Rectal: deferred   Extremities: no rash or edema   Psychiatric: Normal mood and behavior; memory intact     Results Review:     I  reviewed the patient's new clinical results.      Results from last 7 days  Lab Units 03/16/18  0235 03/15/18  0845 03/15/18  0844 03/15/18  0511 03/14/18  0451   WBC 10*3/mm3 11.50*  --   --  12.16* 13.70*   HEMOGLOBIN g/dL 12.9*  --   --  12.9* 12.4*   HEMOGLOBIN, POC g/dL  --  12.2 11.9*  --   --    HEMATOCRIT % 40.0*  --   --  39.8* 38.2*   HEMATOCRIT POC %  --  36* 35*  --   --    PLATELETS 10*3/mm3 210  --   --  190 197       Results from last 7 days  Lab Units 03/16/18  0235 03/15/18  1436 03/15/18  0511  03/13/18  0409   SODIUM mmol/L 138 140 141  < > 143   POTASSIUM mmol/L 3.8 4.2 3.8  < > 3.6   CHLORIDE mmol/L 100 99 100  < > 105   CO2 mmol/L 22.0 17.7* 20.1*  < > 23.6   BUN mg/dL 21* 26* 27*  < > 43*   CREATININE mg/dL 1.32* 1.38* 1.28*  < > 1.94*   CALCIUM mg/dL 8.9 8.5* 8.5*  < > 7.7*   BILIRUBIN mg/dL 0.9  --  1.3*  --  1.0   ALK PHOS U/L 73  --  66  --  55   ALT (SGPT) U/L 31  --  39  --  71*   AST (SGOT) U/L 12  --  14  --  45*   GLUCOSE mg/dL 251* 335* 289*  < > 140*   < > = values in this interval not displayed.    Results from last 7 days  Lab Units 03/12/18  0413 03/12/18  0019   INR  1.24* 1.29*  1.30*     No results found for: LIPASE    Radiology:  XR Chest Post CVA Port   Final Result       Right IJ catheter, no pneumothorax is seen, limited by supine   positioning. Endotracheal tube tip is 7.9 cm above the dottie.        This report was finalized on 3/12/2018 4:31 PM by Dr. Pato Terry MD.          XR Abdomen KUB   Final Result   NG tube as above        This report was finalized on 3/12/2018 4:24 AM by Roberto Carlos Yates MD.          XR Abdomen KUB   Final Result   Nasogastric tube as above        This report was finalized on 3/12/2018 3:07 AM by Roberto Carlos Yates MD.          CT Head Without Contrast   Final Result   1. No acute intracranial abnormality.                           This study was performed with techniques to keep radiation doses as low   as reasonably achievable (ALARA).  Individualized dose reduction   techniques using automated exposure control or adjustment of mA and/or   kV according to the patient size were employed.        This report was finalized on 3/12/2018 1:17 AM by Roberto Carlos Yates MD.          XR Chest 1 View   Final Result   Patient intubated, findings of mild CHF with right effusion   noted           This report was finalized on 3/12/2018 12:21 AM by Roberto Carlos Yates MD.              Assessment/Plan     Patient Active Problem List   Diagnosis   • Cardiac arrest with ventricular fibrillation   Impression  #1 diarrhea: Resolved  #2 elevated LFTs resolved  #3 blood-tinged stool: H&H stable      Recommendation  Can follow-up with Dr. Madelyn Lake in outpatient setting for colonoscopic evaluation  We'll sign off, please call as needed.    I discussed the patients findings and my recommendations with patient.    Leonard Kohli MD

## 2018-03-16 NOTE — PROGRESS NOTES
"      [facility]         Dr Mckeon Sayied   LOS: 4 days   Patient Care Team:  JESUSITA Lam as PCP - General (Family Medicine)    Chief Complaint: Resuscitated V. fib arrest in the setting of positive drug screen.  Persistent lactic acidosis and acute kidney injury and hyperkalemia.    Interval History: Sitting up in a chair and denies any specific complaints today.  No drips today.  Plans for AICD today.  REVIEW OF SYSTEMS:   No chest pain no shortness of breath.      Vent none at this time      Vital Signs  Temp:  [97.5 °F (36.4 °C)-97.6 °F (36.4 °C)] 97.6 °F (36.4 °C)  Heart Rate:  [] 109  Resp:  [14-20] 20  BP: ()/() 134/85    Intake/Output Summary (Last 24 hours) at 03/16/18 0923  Last data filed at 03/16/18 0500   Gross per 24 hour   Intake          1988.07 ml   Output             2900 ml   Net          -911.93 ml     Flowsheet Rows    Flowsheet Row First Filed Value   Admission Height 200.7 cm (79\") Documented at 03/11/2018 2344   Admission Weight (!)  141 kg (310 lb) ['s license] Documented at 03/11/2018 2344          Physical Exam:   General Appearance:    Awake alert no distress.  No issues reported.     Lungs:     Equal breath sounds with rhonchi and crackles on the bases.      Heart:    Regular rhythm and normal rate, normal S1 and S2, no            murmur, no gallop, no rub, no click   Chest Wall:    No abnormalities observed   Abdomen:    Soft mildly distended.  Diffuse tenderness no rebound or guarding.     Extremities:   Moves all extremities well, 1+ edema, no cyanosis, no             redness     Results Review:          Results from last 7 days  Lab Units 03/16/18  0235 03/15/18  1436 03/15/18  0511   SODIUM mmol/L 138 140 141   POTASSIUM mmol/L 3.8 4.2 3.8   CHLORIDE mmol/L 100 99 100   CO2 mmol/L 22.0 17.7* 20.1*   BUN mg/dL 21* 26* 27*   CREATININE mg/dL 1.32* 1.38* 1.28*   GLUCOSE mg/dL 251* 335* 289*   CALCIUM mg/dL 8.9 8.5* 8.5*       Results " from last 7 days  Lab Units 03/13/18  0409 03/12/18  0019   TROPONIN T ng/mL 0.131* <0.010       Results from last 7 days  Lab Units 03/16/18  0235 03/15/18  0845 03/15/18  0844 03/15/18  0511 03/14/18  0451   WBC 10*3/mm3 11.50*  --   --  12.16* 13.70*   HEMOGLOBIN g/dL 12.9*  --   --  12.9* 12.4*   HEMOGLOBIN, POC g/dL  --  12.2 11.9*  --   --    HEMATOCRIT % 40.0*  --   --  39.8* 38.2*   HEMATOCRIT POC %  --  36* 35*  --   --    PLATELETS 10*3/mm3 210  --   --  190 197       Results from last 7 days  Lab Units 03/12/18  0413 03/12/18  0019   INR  1.24* 1.29*  1.30*   APTT seconds  --  35.6*  35.4           Results from last 7 days  Lab Units 03/15/18  0511   MAGNESIUM mg/dL 1.9           Results from last 7 days  Lab Units 03/13/18  1132   PH, ARTERIAL pH units 7.417   PO2 ART mm Hg 74.0*   PCO2, ARTERIAL mm Hg 35.0   HCO3 ART mmol/L 22.6       I reviewed the patient's new clinical results.  I personally viewed and interpreted the patient's CXR        Medication Review:     insulin aspart 0-14 Units Subcutaneous Q4H   insulin detemir 15 Units Subcutaneous Nightly   metoprolol tartrate 50 mg Oral Q12H   pantoprazole 40 mg Intravenous QAM AC   piperacillin-tazobactam 3.375 g Intravenous Q8H   vancomycin 1,000 mg Intravenous On Call to OR         sodium chloride 9 mL/hr Last Rate: 9 mL/hr (03/14/18 0830)       ASSESSMENT:   Resuscitated V. fib arrest  Severe nonischemic cardiomyopathy  Acute respiratory failure, mechanical ventilation  Aspiration pneumonia  Sinus tachycardia  Atrial fibrillation presumed new onset  Positive UDS  Acute kidney injury with severe hyperkalemia  Severe lactic acidosis  Possible GI bleed    PLAN:  Status post cardiac catheter.  NotedSevere nonischemic cardiomyopathy.  Plans for AICD placement per cardiology today  Improving leukocytosis and lactic acidosis now.   IV fluids per renal  Remains on full dose anticoagulation per cardiology.   Wean oxygen as tolerated.  Continue Zosyn for  possible aspiration pneumonia.  We'll switch to oral antibiotics in the morning.  Hemoglobin stable.  No further evidence of GI bleed.  Plans per GI for outpatient colonoscopy.  Transfer out of the ICU once cleared by cardiology post AICD placement.      Linda Blank MD  03/16/18  9:23 AM

## 2018-03-16 NOTE — PROGRESS NOTES
"   LOS: 4 days    Patient Care Team:  JESUSITA Lam as PCP - General (Family Medicine)    Chief Complaint:    Chief Complaint   Patient presents with   • Cardiac Arrest       Subjective  Follow UP SUHAIL    Interval History:   Patient sitting up in the chair today, denies any chest pain or shortness of air, no nausea or vomiting, he has increased lower extremity edema.  There is a plan for AICD implant today.  He has recurrent diarrhea, no dysuria or gross hematuria.    Review of Systems:   As noted above.     Objective     Vital Signs  Temp:  [97.5 °F (36.4 °C)-97.6 °F (36.4 °C)] 97.6 °F (36.4 °C)  Heart Rate:  [] 109  Resp:  [14-20] 20  BP: ()/() 134/85    Flowsheet Rows    Flowsheet Row First Filed Value   Admission Height 200.7 cm (79\") Documented at 03/11/2018 2344   Admission Weight (!)  141 kg (310 lb) ['s license] Documented at 03/11/2018 2344          No intake/output data recorded.  I/O last 3 completed shifts:  In: 2179.1 [P.O.:720; I.V.:1459.1]  Out: 4300 [Urine:4300]    Intake/Output Summary (Last 24 hours) at 03/16/18 0910  Last data filed at 03/16/18 0500   Gross per 24 hour   Intake          1988.07 ml   Output             2900 ml   Net          -911.93 ml       Physical Exam:  General Appearance: alert, oriented x 3, no acute distress,   Skin: warm and dry  HEENT: Nonicteric sclerae, oral mucosa is dry   Neck: supple, no JVD, trachea midline  Lungs: CTA, unlabored breathing effort  Heart: Irregularly irregular, no rub  Abdomen: soft, non-tender,  present bowel sounds to auscultation  : no palpable bladder,  Extremities: 2+ edema, no cyanosis or clubbing  Neuro: normal speech and mental status       Results Review:      Results from last 7 days  Lab Units 03/16/18  0235 03/15/18  1436 03/15/18  0511  03/13/18  0409   SODIUM mmol/L 138 140 141  < > 143   POTASSIUM mmol/L 3.8 4.2 3.8  < > 3.6   CHLORIDE mmol/L 100 99 100  < > 105   CO2 mmol/L 22.0 17.7* 20.1*  < > 23.6 "   BUN mg/dL 21* 26* 27*  < > 43*   CREATININE mg/dL 1.32* 1.38* 1.28*  < > 1.94*   CALCIUM mg/dL 8.9 8.5* 8.5*  < > 7.7*   BILIRUBIN mg/dL 0.9  --  1.3*  --  1.0   ALK PHOS U/L 73  --  66  --  55   ALT (SGPT) U/L 31  --  39  --  71*   AST (SGOT) U/L 12  --  14  --  45*   GLUCOSE mg/dL 251* 335* 289*  < > 140*   < > = values in this interval not displayed.    Estimated Creatinine Clearance: 105.8 mL/min (by C-G formula based on SCr of 1.32 mg/dL (H)).      Results from last 7 days  Lab Units 03/16/18  0235 03/15/18  1436 03/15/18  0511 03/14/18  0451 03/13/18  0409   MAGNESIUM mg/dL  --   --  1.9 2.1 2.1   PHOSPHORUS mg/dL 3.6 3.4 2.8 2.9 4.3         Results from last 7 days  Lab Units 03/13/18  0409   URIC ACID mg/dL 6.0         Results from last 7 days  Lab Units 03/16/18  0235 03/15/18  0845 03/15/18  0844 03/15/18  0511 03/14/18  0451 03/13/18  0409  03/12/18  0413   WBC 10*3/mm3 11.50*  --   --  12.16* 13.70* 16.17*  --  27.27*   HEMOGLOBIN g/dL 12.9*  --   --  12.9* 12.4* 14.3  < > 16.7   HEMOGLOBIN, POC g/dL  --  12.2 11.9*  --   --   --   --   --    PLATELETS 10*3/mm3 210  --   --  190 197 240  --  294   < > = values in this interval not displayed.      Results from last 7 days  Lab Units 03/12/18  0413 03/12/18  0019   INR  1.24* 1.29*  1.30*         Imaging Results (last 24 hours)     ** No results found for the last 24 hours. **          insulin aspart 0-14 Units Subcutaneous Q4H   insulin detemir 15 Units Subcutaneous Nightly   metoprolol tartrate 50 mg Oral Q12H   pantoprazole 40 mg Intravenous QAM AC   piperacillin-tazobactam 3.375 g Intravenous Q8H   vancomycin 1,000 mg Intravenous On Call to OR       sodium chloride 9 mL/hr Last Rate: 9 mL/hr (03/14/18 0830)       Medication Review:   Current Facility-Administered Medications   Medication Dose Route Frequency Provider Last Rate Last Dose   • acetaminophen (TYLENOL) tablet 650 mg  650 mg Oral Q6H PRN Linda Blank MD   650 mg at 03/14/18 1135   •  dextrose (D50W) solution 25 g  25 g Intravenous Q15 Min PRN Linda Blank MD       • dextrose (D50W) solution 25 g  25 g Intravenous Q15 Min PRN Linda Blank MD       • dextrose (GLUTOSE) oral gel 15 g  15 g Oral Q15 Min PRN Linda Blank MD       • dextrose (GLUTOSE) oral gel 15 g  15 g Oral Q15 Min PRN Linda Blank MD       • glucagon (human recombinant) (GLUCAGEN DIAGNOSTIC) injection 1 mg  1 mg Subcutaneous PRN Linda Blank MD       • glucagon (human recombinant) (GLUCAGEN DIAGNOSTIC) injection 1 mg  1 mg Subcutaneous PRN Linda Blank MD       • HYDROcodone-acetaminophen (NORCO) 5-325 MG per tablet 1 tablet  1 tablet Oral Q4H PRN Valentin Moya MD       • insulin aspart (novoLOG) injection 0-14 Units  0-14 Units Subcutaneous Q4H Linda Blank MD   5 Units at 03/16/18 0853   • insulin detemir (LEVEMIR) injection 15 Units  15 Units Subcutaneous Nightly Linda Blank MD   15 Units at 03/15/18 2054   • loperamide (IMODIUM) capsule 2 mg  2 mg Oral 4x Daily PRN Linda Blank MD   2 mg at 03/14/18 1021   • metoprolol tartrate (LOPRESSOR) tablet 50 mg  50 mg Oral Q12H Valentin Moya MD   50 mg at 03/16/18 0847   • morphine injection 1 mg  1 mg Intravenous Q4H PRN Valentin Moya MD        And   • naloxone (NARCAN) injection 0.4 mg  0.4 mg Intravenous Q5 Min PRN Valentin Moya MD       • ondansetron (ZOFRAN) tablet 4 mg  4 mg Oral Q6H PRN Valentin Moya MD        Or   • ondansetron ODT (ZOFRAN-ODT) disintegrating tablet 4 mg  4 mg Oral Q6H PRN Valentin Moya MD        Or   • ondansetron (ZOFRAN) injection 4 mg  4 mg Intravenous Q6H PRN Valentin Moya MD       • pantoprazole (PROTONIX) injection 40 mg  40 mg Intravenous QAM AC Linda Blank MD   40 mg at 03/16/18 0820   • piperacillin-tazobactam (ZOSYN) 3.375 g in iso-osmotic dextrose 50 ml (premix)  3.375 g Intravenous Q8H Linda Blank MD   3.375 g at 03/16/18 0226   • sodium chloride 0.9 % flush 10 mL  10 mL Intravenous PRN Jimmy Rodrigez MD        • sodium chloride 0.9 % infusion  9 mL/hr Intravenous Continuous Linda Blank MD 9 mL/hr at 03/14/18 0830 9 mL/hr at 03/14/18 0830   • vancomycin (VANCOCIN) in iso-osmotic dextrose IVPB 1 g (premix) 200 mL  1,000 mg Intravenous On Call to OR JESUSITA Miller           Assessment/Plan   1. SUHAIL  associated with  cardiac arrest.  No baseline renal function available at this time. Creatinine 1.32, he had heart catheter yesterday.  2.  SP Vfib arrest , resuscitated. Severe non-ischemic cardiomyopathy , for AICD implant today   3.  Polysubstance abuse, cocaine  4.  Diabetes mellitus type II    5.  Atrial fibrillation with controlled rate   6. Metabolic acidosis.  Resolved  7. Diarrhea being evaluated       Plan:  1.  To continue the same treatment.  2.  Surveillance labs         Trent Arias MD  03/16/18  9:10 AM

## 2018-03-16 NOTE — PROGRESS NOTES
Continued Stay Note  [facility]     Patient Name: Shannan Arguelles  MRN: [id number]  Today's Date: 3/16/2018    Admit Date: 3/11/2018          Discharge Plan     Row Name 03/16/18 4190       Plan    Plan Home with family    Plan Comments Discussed dc needs with pt at bedside.  Pt has no insurance.  Pt will be discharged on new anticoagulation per Cardiology.  [facility] provided the GoodRx card and alerted him of the pharmacies with the lowest price for Lovenox.  Also alerted him that Warfarin is on the $4 list at [facility].  Gave pt the financial assistance form for [facility] which he will fill out.  He will ask his cardiologist for location suggested for PT/INR draws.  Pt aware that MD will provide a prescription for the blood draw and that he may bring that to the ACC at [facility] to have the blood draw if needed.  Pt feels that he will be able to afford his medications.  He will apply for disability in [place] as soon as possible.   [facility] weekend staff will follow up as necessary.              Discharge Codes    No documentation.           Lili Cabrales RN

## 2018-03-17 LAB
ALBUMIN SERPL-MCNC: 3 G/DL (ref 3.5–5.2)
ALBUMIN SERPL-MCNC: 3.7 G/DL (ref 3.5–5.2)
ALBUMIN UR-MCNC: <1.2 MG/L
ALBUMIN/GLOB SERPL: 1.3 G/DL
ALP SERPL-CCNC: 79 U/L (ref 39–117)
ALT SERPL W P-5'-P-CCNC: 51 U/L (ref 1–41)
ANION GAP SERPL CALCULATED.3IONS-SCNC: 17.5 MMOL/L
AST SERPL-CCNC: 55 U/L (ref 1–40)
BASOPHILS # BLD AUTO: 0.04 10*3/MM3 (ref 0–0.2)
BASOPHILS NFR BLD AUTO: 0.4 % (ref 0–1.5)
BILIRUB SERPL-MCNC: 0.7 MG/DL (ref 0.1–1.2)
BUN BLD-MCNC: 17 MG/DL (ref 6–20)
BUN BLD-MCNC: 17 MG/DL (ref 6–20)
BUN BLD-MCNC: 18 MG/DL (ref 6–20)
BUN/CREAT SERPL: 13.8 (ref 7–25)
BUN/CREAT SERPL: 15.2 (ref 7–25)
BUN/CREAT SERPL: 15.2 (ref 7–25)
CALCIUM SPEC-SCNC: 8.4 MG/DL (ref 8.6–10.5)
CHLORIDE SERPL-SCNC: 100 MMOL/L (ref 98–107)
CHLORIDE SERPL-SCNC: 100 MMOL/L (ref 98–107)
CHLORIDE SERPL-SCNC: 97 MMOL/L (ref 98–107)
CHOLEST SERPL-MCNC: 136 MG/DL (ref 0–200)
CO2 SERPL-SCNC: 20.5 MMOL/L (ref 22–29)
CO2 SERPL-SCNC: 20.5 MMOL/L (ref 22–29)
CO2 SERPL-SCNC: 21.5 MMOL/L (ref 22–29)
CREAT BLD-MCNC: 1.12 MG/DL (ref 0.76–1.27)
CREAT BLD-MCNC: 1.12 MG/DL (ref 0.76–1.27)
CREAT BLD-MCNC: 1.3 MG/DL (ref 0.76–1.27)
DEPRECATED RDW RBC AUTO: 46.9 FL (ref 37–54)
EOSINOPHIL # BLD AUTO: 0.42 10*3/MM3 (ref 0–0.7)
EOSINOPHIL NFR BLD AUTO: 4 % (ref 0.3–6.2)
ERYTHROCYTE [DISTWIDTH] IN BLOOD BY AUTOMATED COUNT: 12.9 % (ref 11.5–14.5)
GFR SERPL CREATININE-BSD FRML MDRD: 58 ML/MIN/1.73
GFR SERPL CREATININE-BSD FRML MDRD: 69 ML/MIN/1.73
GFR SERPL CREATININE-BSD FRML MDRD: 69 ML/MIN/1.73
GLOBULIN UR ELPH-MCNC: 2.8 GM/DL
GLUCOSE BLD-MCNC: 221 MG/DL (ref 65–99)
GLUCOSE BLD-MCNC: 221 MG/DL (ref 65–99)
GLUCOSE BLD-MCNC: 429 MG/DL (ref 65–99)
GLUCOSE BLDC GLUCOMTR-MCNC: 208 MG/DL (ref 70–130)
GLUCOSE BLDC GLUCOMTR-MCNC: 213 MG/DL (ref 70–130)
GLUCOSE BLDC GLUCOMTR-MCNC: 255 MG/DL (ref 70–130)
GLUCOSE BLDC GLUCOMTR-MCNC: 270 MG/DL (ref 70–130)
GLUCOSE BLDC GLUCOMTR-MCNC: 308 MG/DL (ref 70–130)
GLUCOSE BLDC GLUCOMTR-MCNC: 338 MG/DL (ref 70–130)
GLUCOSE BLDC GLUCOMTR-MCNC: 374 MG/DL (ref 70–130)
HBA1C MFR BLD: 8.2 % (ref 4.8–5.6)
HCT VFR BLD AUTO: 40.9 % (ref 40.4–52.2)
HDLC SERPL-MCNC: 34 MG/DL (ref 40–60)
HGB BLD-MCNC: 13.5 G/DL (ref 13.7–17.6)
IMM GRANULOCYTES # BLD: 0.21 10*3/MM3 (ref 0–0.03)
IMM GRANULOCYTES NFR BLD: 2 % (ref 0–0.5)
INR PPP: 1.06 (ref 0.9–1.1)
LDLC SERPL CALC-MCNC: 65 MG/DL (ref 0–100)
LDLC/HDLC SERPL: 1.92 {RATIO}
LYMPHOCYTES # BLD AUTO: 1.88 10*3/MM3 (ref 0.9–4.8)
LYMPHOCYTES NFR BLD AUTO: 17.8 % (ref 19.6–45.3)
MAGNESIUM SERPL-MCNC: 1.8 MG/DL (ref 1.6–2.6)
MCH RBC QN AUTO: 33.3 PG (ref 27–32.7)
MCHC RBC AUTO-ENTMCNC: 33 G/DL (ref 32.6–36.4)
MCV RBC AUTO: 100.7 FL (ref 79.8–96.2)
MONOCYTES # BLD AUTO: 0.89 10*3/MM3 (ref 0.2–1.2)
MONOCYTES NFR BLD AUTO: 8.4 % (ref 5–12)
NEUTROPHILS # BLD AUTO: 7.14 10*3/MM3 (ref 1.9–8.1)
NEUTROPHILS NFR BLD AUTO: 67.4 % (ref 42.7–76)
PHOSPHATE SERPL-MCNC: 3.2 MG/DL (ref 2.5–4.5)
PLATELET # BLD AUTO: 217 10*3/MM3 (ref 140–500)
PMV BLD AUTO: 11 FL (ref 6–12)
POTASSIUM BLD-SCNC: 3.7 MMOL/L (ref 3.5–5.2)
POTASSIUM BLD-SCNC: 3.7 MMOL/L (ref 3.5–5.2)
POTASSIUM BLD-SCNC: 4.2 MMOL/L (ref 3.5–5.2)
PROT SERPL-MCNC: 6.5 G/DL (ref 6–8.5)
PROTHROMBIN TIME: 13.6 SECONDS (ref 11.7–14.2)
RBC # BLD AUTO: 4.06 10*6/MM3 (ref 4.6–6)
SODIUM BLD-SCNC: 136 MMOL/L (ref 136–145)
SODIUM BLD-SCNC: 138 MMOL/L (ref 136–145)
SODIUM BLD-SCNC: 138 MMOL/L (ref 136–145)
TRIGL SERPL-MCNC: 183 MG/DL (ref 0–150)
TSH SERPL DL<=0.05 MIU/L-ACNC: 2.95 MIU/ML (ref 0.27–4.2)
URATE SERPL-MCNC: 5 MG/DL (ref 3.4–7)
VLDLC SERPL-MCNC: 36.6 MG/DL (ref 5–40)
WBC NRBC COR # BLD: 10.58 10*3/MM3 (ref 4.5–10.7)

## 2018-03-17 PROCEDURE — 83036 HEMOGLOBIN GLYCOSYLATED A1C: CPT | Performed by: INTERNAL MEDICINE

## 2018-03-17 PROCEDURE — 85025 COMPLETE CBC W/AUTO DIFF WBC: CPT | Performed by: INTERNAL MEDICINE

## 2018-03-17 PROCEDURE — 80048 BASIC METABOLIC PNL TOTAL CA: CPT | Performed by: INTERNAL MEDICINE

## 2018-03-17 PROCEDURE — 83735 ASSAY OF MAGNESIUM: CPT | Performed by: INTERNAL MEDICINE

## 2018-03-17 PROCEDURE — 25010000002 ENOXAPARIN PER 10 MG: Performed by: NURSE PRACTITIONER

## 2018-03-17 PROCEDURE — 85610 PROTHROMBIN TIME: CPT | Performed by: NURSE PRACTITIONER

## 2018-03-17 PROCEDURE — 99231 SBSQ HOSP IP/OBS SF/LOW 25: CPT | Performed by: NURSE PRACTITIONER

## 2018-03-17 PROCEDURE — 84443 ASSAY THYROID STIM HORMONE: CPT | Performed by: INTERNAL MEDICINE

## 2018-03-17 PROCEDURE — 80061 LIPID PANEL: CPT | Performed by: INTERNAL MEDICINE

## 2018-03-17 PROCEDURE — 90791 PSYCH DIAGNOSTIC EVALUATION: CPT | Performed by: SOCIAL WORKER

## 2018-03-17 PROCEDURE — 82962 GLUCOSE BLOOD TEST: CPT

## 2018-03-17 PROCEDURE — 84550 ASSAY OF BLOOD/URIC ACID: CPT | Performed by: INTERNAL MEDICINE

## 2018-03-17 PROCEDURE — 82043 UR ALBUMIN QUANTITATIVE: CPT | Performed by: INTERNAL MEDICINE

## 2018-03-17 PROCEDURE — 80053 COMPREHEN METABOLIC PANEL: CPT | Performed by: INTERNAL MEDICINE

## 2018-03-17 PROCEDURE — 80069 RENAL FUNCTION PANEL: CPT | Performed by: INTERNAL MEDICINE

## 2018-03-17 PROCEDURE — 25010000002 PIPERACILLIN SOD-TAZOBACTAM PER 1 G: Performed by: INTERNAL MEDICINE

## 2018-03-17 PROCEDURE — 63710000001 INSULIN ASPART PER 5 UNITS: Performed by: INTERNAL MEDICINE

## 2018-03-17 RX ORDER — METOPROLOL TARTRATE 50 MG/1
50 TABLET, FILM COATED ORAL EVERY 6 HOURS
Status: DISCONTINUED | OUTPATIENT
Start: 2018-03-17 | End: 2018-03-18

## 2018-03-17 RX ADMIN — INSULIN ASPART 5 UNITS: 100 INJECTION, SOLUTION INTRAVENOUS; SUBCUTANEOUS at 01:57

## 2018-03-17 RX ADMIN — PANTOPRAZOLE SODIUM 40 MG: 40 TABLET, DELAYED RELEASE ORAL at 07:10

## 2018-03-17 RX ADMIN — ENOXAPARIN SODIUM 70 MG: 80 INJECTION SUBCUTANEOUS at 08:30

## 2018-03-17 RX ADMIN — WARFARIN SODIUM 5 MG: 5 TABLET ORAL at 21:38

## 2018-03-17 RX ADMIN — INSULIN ASPART 8 UNITS: 100 INJECTION, SOLUTION INTRAVENOUS; SUBCUTANEOUS at 08:29

## 2018-03-17 RX ADMIN — LISINOPRIL 2.5 MG: 2.5 TABLET ORAL at 08:30

## 2018-03-17 RX ADMIN — METOPROLOL TARTRATE 50 MG: 50 TABLET, FILM COATED ORAL at 07:11

## 2018-03-17 RX ADMIN — INSULIN ASPART 10 UNITS: 100 INJECTION, SOLUTION INTRAVENOUS; SUBCUTANEOUS at 21:15

## 2018-03-17 RX ADMIN — TAZOBACTAM SODIUM AND PIPERACILLIN SODIUM 3.38 G: 375; 3 INJECTION, SOLUTION INTRAVENOUS at 01:44

## 2018-03-17 RX ADMIN — TAZOBACTAM SODIUM AND PIPERACILLIN SODIUM 3.38 G: 375; 3 INJECTION, SOLUTION INTRAVENOUS at 17:59

## 2018-03-17 RX ADMIN — INSULIN ASPART 10 UNITS: 100 INJECTION, SOLUTION INTRAVENOUS; SUBCUTANEOUS at 16:28

## 2018-03-17 RX ADMIN — METOPROLOL TARTRATE 50 MG: 50 TABLET, FILM COATED ORAL at 14:11

## 2018-03-17 RX ADMIN — INSULIN ASPART 5 UNITS: 100 INJECTION, SOLUTION INTRAVENOUS; SUBCUTANEOUS at 04:13

## 2018-03-17 RX ADMIN — ENOXAPARIN SODIUM 70 MG: 80 INJECTION SUBCUTANEOUS at 21:14

## 2018-03-17 RX ADMIN — TAZOBACTAM SODIUM AND PIPERACILLIN SODIUM 3.38 G: 375; 3 INJECTION, SOLUTION INTRAVENOUS at 10:33

## 2018-03-17 RX ADMIN — HYDROCODONE BITARTRATE AND ACETAMINOPHEN 1 TABLET: 5; 325 TABLET ORAL at 11:40

## 2018-03-17 RX ADMIN — HYDROCODONE BITARTRATE AND ACETAMINOPHEN 1 TABLET: 5; 325 TABLET ORAL at 04:13

## 2018-03-17 RX ADMIN — METOPROLOL TARTRATE 50 MG: 50 TABLET, FILM COATED ORAL at 21:16

## 2018-03-17 NOTE — PROGRESS NOTES
Chart was reviewed. Patient was up in chair, pleasant and engaging.  He states he is about to get a shower which he really wants.  He is agreeable to have [facility] return to complete BRADFORD eval.  [facility] will return today and see patient.  NA w/ patient now.

## 2018-03-17 NOTE — CONSULTS
"51 y/o cauc single never  male admitted after someone dropped him off at the ED in cardiac arrest.  Patient had used cocaine.  Patient seen by access center due to BRADFORD issues.  He reports that he snorts 0.5 gms cocaine a couple x's wk.  He denies any hx of IV drug use.  He states that he began using again this past June.  He Used some in college. He reports heaving marijuana use when in college.  He denies any marijuana use in 8 weeks.  Patient reports he consumes ETOH 2-3 x's/month consuming 1-3 beers each time.  He denies hx of DT's.  He denies any hx of SI or HI.  UDS was positive for THC, Opiates, and cocaine    Patient denies any hx of 12 step meetings and denies any hx of DT's.  He denies any hx of BRADFORD or mental health treatment. Patient denies need for any treatment now. He states what he went through \"when my heart stopped\" and the pain afterwards is the primary reason.  He states is going to have to weed out some of his friends.  He states the cocaine use began again when he met some friends in June 2017.      Patient is a full-time [employer] .  He had worked at [employer] for several years until he was laid off.  Patient has 2 years of college.  He and his mother live together in a mobile home.  He denies any hx of legal issues.  HE is diabetic.    Patient is alert and O x 4.  He is pleasant and engaging.  He denies any SI or HI.  No a/v hallucinations. Speech is appropriate.  Discussed risk factors of using again.  He at this time does not believe he will use again and that he is not currently interested in any BRADFORD tx.  [facility] will follow.    "

## 2018-03-17 NOTE — PROGRESS NOTES
"Shannan DOYLE Blane  1965 52 y.o.  [id number]      Patient Care Team:  JESUSITA Lam as PCP - General (Family Medicine)    CC: Cardiac arrest, severe cardiomyopathy, hypertension, cocaine abuse, atrial fib RVR    Interval History:   Chest \"sore from CPR\". Denies dizziness, palpitations at rest or with activity.  Denies SOA    Remains A. fib with VR 90s-115 at rest  -130s/80s  INR 1.06  Remains on full dose Lovenox bridge therapy    Objective   Vital Signs  Temp:  [97.6 °F (36.4 °C)-98.4 °F (36.9 °C)] 98.3 °F (36.8 °C)  Heart Rate:  [] 106  Resp:  [18-20] 18  BP: (114-147)/() 139/84    Intake/Output Summary (Last 24 hours) at 03/17/18 1439  Last data filed at 03/17/18 1300   Gross per 24 hour   Intake             1140 ml   Output             2500 ml   Net            -1360 ml     Flowsheet Rows    Flowsheet Row First Filed Value   Admission Height 200.7 cm (79\") Documented at 03/11/2018 2344   Admission Weight (!)  141 kg (310 lb) ['s license] Documented at 03/11/2018 2344          Physical Exam:   General Appearance:    Alert,oriented, in no acute distress   Lungs:     Clear to auscultation,BS are equal    Heart:    Normal S1 and S2, iRRR without murmur, gallop or rub   HEENT:    Sclera are clear, no JVD or adenopathy   Abdomen:     Normal bowel sounds, soft non-tender, non-distended, no HSM   Extremities:   Moves all extremities well, no edema, no cyanosis, no             Redness, no rash   Tele        Medication Review:        enoxaparin 0.5 mg/kg Subcutaneous Q12H   insulin aspart 0-14 Units Subcutaneous 4x Daily AC & at Bedtime   insulin detemir 15 Units Subcutaneous Nightly   lisinopril 2.5 mg Oral Q24H   metoprolol tartrate 50 mg Oral Q8H   pantoprazole 40 mg Oral Q AM   piperacillin-tazobactam 3.375 g Intravenous Q8H   warfarin 5 mg Oral Daily       sodium chloride 9 mL/hr Last Rate: 9 mL/hr (03/14/18 0830)         I reviewed the patient's new clinical results.  I personally " viewed and interpreted the patient's EKG/Telemetry data    Assessment/Plan  - Cocaine abuse  - Status post cardiac arrest  - Severe cardiomyopathy  - afib rvr - rate remains poorly controlled on full dose Lovenox bridge therapy while loading Coumadin  - diabetes   - hypertension     Blood pressure stable.  Change metoprolol to every 6 hours, hopefully this will improve rate control.          Leanna German, JESUSITA  03/17/18  2:39 PM

## 2018-03-17 NOTE — PROGRESS NOTES
"   LOS: 5 days    Patient Care Team:  JESUSITA Lam as PCP - General (Family Medicine)    Chief Complaint:    Chief Complaint   Patient presents with   • Cardiac Arrest       Subjective  Follow UP SUHAIL    Interval History:   Patient sitting up in the chair today eating lunch.  Denies any chest pain or shortness of air, no nausea or vomiting, he has increased lower extremity edema L>R.  no dysuria or gross hematuria.    Review of Systems:   As noted above.     Objective     Vital Signs  Temp:  [97.6 °F (36.4 °C)-98.4 °F (36.9 °C)] 97.6 °F (36.4 °C)  Heart Rate:  [] 106  Resp:  [18-24] 18  BP: (112-147)/() 114/80    Flowsheet Rows    Flowsheet Row First Filed Value   Admission Height 200.7 cm (79\") Documented at 03/11/2018 2344   Admission Weight (!)  141 kg (310 lb) ['s license] Documented at 03/11/2018 2344          No intake/output data recorded.  I/O last 3 completed shifts:  In: 1587 [P.O.:660; I.V.:677; IV Piggyback:250]  Out: 3825 [Urine:3825]    Intake/Output Summary (Last 24 hours) at 03/17/18 1052  Last data filed at 03/17/18 0700   Gross per 24 hour   Intake             1160 ml   Output             1975 ml   Net             -815 ml       Physical Exam:  General Appearance: alert, oriented x 3, no acute distress,   Skin: warm and dry  HEENT: Nonicteric sclerae, oral mucosa is dry   Neck: supple, no JVD, trachea midline  Lungs: CTA, unlabored breathing effort  Heart: Irregularly irregular, no rub  Abdomen: soft, non-tender,  present bowel sounds to auscultation  : no palpable bladder,  Extremities: 2+ edema, no cyanosis or clubbing  Neuro: normal speech and mental status       Results Review:      Results from last 7 days  Lab Units 03/17/18  0309 03/16/18  0235 03/15/18  1436 03/15/18  0511  03/13/18  0409   SODIUM mmol/L 138  138 138 140 141  < > 143   POTASSIUM mmol/L 3.7  3.7 3.8 4.2 3.8  < > 3.6   CHLORIDE mmol/L 100  100 100 99 100  < > 105   CO2 mmol/L 20.5*  20.5* 22.0 " 17.7* 20.1*  < > 23.6   BUN mg/dL 17  17 21* 26* 27*  < > 43*   CREATININE mg/dL 1.12  1.12 1.32* 1.38* 1.28*  < > 1.94*   CALCIUM mg/dL 8.4*  8.4* 8.9 8.5* 8.5*  < > 7.7*   BILIRUBIN mg/dL  --  0.9  --  1.3*  --  1.0   ALK PHOS U/L  --  73  --  66  --  55   ALT (SGPT) U/L  --  31  --  39  --  71*   AST (SGOT) U/L  --  12  --  14  --  45*   GLUCOSE mg/dL 221*  221* 251* 335* 289*  < > 140*   < > = values in this interval not displayed.    Estimated Creatinine Clearance: 123.6 mL/min (by C-G formula based on SCr of 1.12 mg/dL).      Results from last 7 days  Lab Units 03/17/18  0309 03/16/18  0235 03/15/18  1436 03/15/18  0511 03/14/18  0451   MAGNESIUM mg/dL 1.8  --   --  1.9 2.1   PHOSPHORUS mg/dL 3.2 3.6 3.4 2.8 2.9         Results from last 7 days  Lab Units 03/17/18  0309 03/13/18  0409   URIC ACID mg/dL 5.0 6.0         Results from last 7 days  Lab Units 03/17/18  0309 03/16/18  0235 03/15/18  0845 03/15/18  0844 03/15/18  0511 03/14/18  0451 03/13/18  0409   WBC 10*3/mm3 10.58 11.50*  --   --  12.16* 13.70* 16.17*   HEMOGLOBIN g/dL 13.5* 12.9*  --   --  12.9* 12.4* 14.3   HEMOGLOBIN, POC g/dL  --   --  12.2 11.9*  --   --   --    PLATELETS 10*3/mm3 217 210  --   --  190 197 240         Results from last 7 days  Lab Units 03/17/18  0309 03/12/18  0413 03/12/18  0019   INR  1.06 1.24* 1.29*  1.30*         Imaging Results (last 24 hours)     ** No results found for the last 24 hours. **          enoxaparin 0.5 mg/kg Subcutaneous Q12H   insulin aspart 0-14 Units Subcutaneous 4x Daily AC & at Bedtime   insulin detemir 15 Units Subcutaneous Nightly   lisinopril 2.5 mg Oral Q24H   metoprolol tartrate 50 mg Oral Q8H   pantoprazole 40 mg Oral Q AM   piperacillin-tazobactam 3.375 g Intravenous Q8H   warfarin 5 mg Oral Daily       sodium chloride 9 mL/hr Last Rate: 9 mL/hr (03/14/18 0830)       Medication Review:   Current Facility-Administered Medications   Medication Dose Route Frequency Provider Last Rate Last  Dose   • acetaminophen (TYLENOL) tablet 650 mg  650 mg Oral Q6H PRN Linda Blank MD   650 mg at 03/14/18 1135   • dextrose (D50W) solution 25 g  25 g Intravenous Q15 Min PRN Linda Blank MD       • dextrose (GLUTOSE) oral gel 15 g  15 g Oral Q15 Min PRN Linda Blank MD       • enoxaparin (LOVENOX) syringe 70 mg  0.5 mg/kg Subcutaneous Q12H JESUSITA Miller   70 mg at 03/17/18 0830   • glucagon (human recombinant) (GLUCAGEN DIAGNOSTIC) injection 1 mg  1 mg Subcutaneous PRN Linda Blank MD       • HYDROcodone-acetaminophen (NORCO) 5-325 MG per tablet 1 tablet  1 tablet Oral Q4H PRN Valentin Moya MD   1 tablet at 03/17/18 0413   • HYDROmorphone (DILAUDID) injection 0.5 mg  0.5 mg Intravenous Q2H PRN JESUSITA Miller        And   • naloxone (NARCAN) injection 0.4 mg  0.4 mg Intravenous Q5 Min PRN JESUSITA Miller       • insulin aspart (novoLOG) injection 0-14 Units  0-14 Units Subcutaneous 4x Daily AC & at Bedtime Linda Blank MD   8 Units at 03/17/18 0829   • insulin detemir (LEVEMIR) injection 15 Units  15 Units Subcutaneous Nightly Linda Blank MD   15 Units at 03/16/18 2123   • lisinopril (PRINIVIL,ZESTRIL) tablet 2.5 mg  2.5 mg Oral Q24H Valentin Moya MD   2.5 mg at 03/17/18 0830   • loperamide (IMODIUM) capsule 2 mg  2 mg Oral 4x Daily PRN Linda Blank MD   2 mg at 03/14/18 1021   • metoprolol tartrate (LOPRESSOR) tablet 50 mg  50 mg Oral Q8H Valentin Moya MD   50 mg at 03/17/18 0711   • morphine injection 1 mg  1 mg Intravenous Q4H PRN Valentin Moya MD        And   • naloxone (NARCAN) injection 0.4 mg  0.4 mg Intravenous Q5 Min PRN Valentin Moya MD       • ondansetron (ZOFRAN) tablet 4 mg  4 mg Oral Q6H PRN Valentin Moya MD        Or   • ondansetron ODT (ZOFRAN-ODT) disintegrating tablet 4 mg  4 mg Oral Q6H PRN Valentin Moya MD        Or   • ondansetron (ZOFRAN) injection 4 mg  4 mg Intravenous Q6H PRN Valentin Moya MD       • ondansetron (ZOFRAN) injection 4 mg  4 mg  Intravenous Q6H PRN JESUSITA Miller       • pantoprazole (PROTONIX) EC tablet 40 mg  40 mg Oral Q AM Linda Blank MD   40 mg at 03/17/18 0710   • piperacillin-tazobactam (ZOSYN) 3.375 g in iso-osmotic dextrose 50 ml (premix)  3.375 g Intravenous Q8H Linda Blank MD   3.375 g at 03/17/18 1033   • sodium chloride 0.9 % flush 1-10 mL  1-10 mL Intravenous PRN JESUSITA Miller       • sodium chloride 0.9 % flush 10 mL  10 mL Intravenous PRN Jimmy Rodrigez MD       • sodium chloride 0.9 % infusion  9 mL/hr Intravenous Continuous Linda Blank MD 9 mL/hr at 03/14/18 0830 9 mL/hr at 03/14/18 0830   • warfarin (COUMADIN) tablet 5 mg  5 mg Oral Daily JESUSITA Miller   5 mg at 03/16/18 1807       Assessment/Plan   1. SUHAIL  associated with  cardiac arrest.  No baseline renal function available at this time. Creatinine continues to trend down. Creatinine 1.12mg/dl  2.  SP Vfib arrest , resuscitated. Severe non-ischemic cardiomyopathy ,s/p AICD 3/16/18  3.  Polysubstance abuse, cocaine  4.  Diabetes mellitus type II    5.  Atrial fibrillation with controlled rate   6. Metabolic acidosis.  Resolved  7. Diarrhea being evaluated       Plan:  1.  Continue the same treatment.  2.  Surveillance labs         Ju Barahona MD  03/17/18  10:52 AM

## 2018-03-17 NOTE — PLAN OF CARE
Problem: Fall Risk (Adult)  Goal: Identify Related Risk Factors and Signs and Symptoms  Outcome: Ongoing (interventions implemented as appropriate)   03/17/18 0357   Fall Risk (Adult)   Related Risk Factors (Fall Risk) environment unfamiliar   Signs and Symptoms (Fall Risk) presence of risk factors       Problem: Skin Injury Risk (Adult)  Goal: Identify Related Risk Factors and Signs and Symptoms  Outcome: Ongoing (interventions implemented as appropriate)   03/17/18 0357   Skin Injury Risk (Adult)   Related Risk Factors (Skin Injury Risk) body weight extremes;edema     Goal: Skin Health and Integrity  Outcome: Ongoing (interventions implemented as appropriate)   03/17/18 0357   Skin Injury Risk (Adult)   Skin Health and Integrity making progress toward outcome       Problem: Patient Care Overview  Goal: Plan of Care Review  Outcome: Ongoing (interventions implemented as appropriate)   03/17/18 0357   OTHER   Outcome Summary Pt VSS. S/P AICD placement, site soft, closed with skin adhesive. C/O pain, relieved with PO pain meds. IV ABX given. Will continue to monitor.    Coping/Psychosocial   Plan of Care Reviewed With patient   Plan of Care Review   Progress improving     Goal: Individualization and Mutuality  Outcome: Ongoing (interventions implemented as appropriate)    Goal: Discharge Needs Assessment  Outcome: Ongoing (interventions implemented as appropriate)   03/14/18 1423   Discharge Needs Assessment   Readmission Within the Last 30 Days no previous admission in last 30 days   Concerns to be Addressed discharge planning   Concerns Comments continue to assess for skilled needs   Patient/Family Anticipates Transition to inpatient rehabilitation facility;home with family;home with help/services   Patient/Family Anticipated Services at Transition outpatient care   Transportation Concerns car, none   Transportation Anticipated car, drives self   Anticipated Changes Related to Illness none   Equipment Needed After  Discharge none   Outpatient/Agency/Support Group Needs other (see comments)   Discharge Facility/Level of Care Needs home with home health   Offered/Gave Vendor List yes   Current Discharge Risk substance use/abuse   Disability   Equipment Currently Used at Home none       Problem: Pain, Acute (Adult)  Goal: Identify Related Risk Factors and Signs and Symptoms  Outcome: Ongoing (interventions implemented as appropriate)   03/17/18 0357   Pain, Acute (Adult)   Related Risk Factors (Acute Pain) positioning;procedure/treatment   Signs and Symptoms (Acute Pain) verbalization of pain descriptors     Goal: Acceptable Pain Control/Comfort Level  Outcome: Ongoing (interventions implemented as appropriate)   03/17/18 0357   Pain, Acute (Adult)   Acceptable Pain Control/Comfort Level making progress toward outcome

## 2018-03-18 ENCOUNTER — APPOINTMENT (OUTPATIENT)
Dept: GENERAL RADIOLOGY | Facility: HOSPITAL | Age: 53
End: 2018-03-18

## 2018-03-18 LAB
ANION GAP SERPL CALCULATED.3IONS-SCNC: 14.4 MMOL/L
BUN BLD-MCNC: 16 MG/DL (ref 6–20)
BUN/CREAT SERPL: 13.2 (ref 7–25)
CALCIUM SPEC-SCNC: 8.3 MG/DL (ref 8.6–10.5)
CHLORIDE SERPL-SCNC: 100 MMOL/L (ref 98–107)
CO2 SERPL-SCNC: 22.6 MMOL/L (ref 22–29)
CREAT BLD-MCNC: 1.21 MG/DL (ref 0.76–1.27)
GFR SERPL CREATININE-BSD FRML MDRD: 63 ML/MIN/1.73
GLUCOSE BLD-MCNC: 352 MG/DL (ref 65–99)
GLUCOSE BLDC GLUCOMTR-MCNC: 278 MG/DL (ref 70–130)
GLUCOSE BLDC GLUCOMTR-MCNC: 286 MG/DL (ref 70–130)
GLUCOSE BLDC GLUCOMTR-MCNC: 303 MG/DL (ref 70–130)
GLUCOSE BLDC GLUCOMTR-MCNC: 310 MG/DL (ref 70–130)
GLUCOSE BLDC GLUCOMTR-MCNC: 386 MG/DL (ref 70–130)
INR PPP: 1.14 (ref 0.9–1.1)
POTASSIUM BLD-SCNC: 4.1 MMOL/L (ref 3.5–5.2)
PROTHROMBIN TIME: 14.4 SECONDS (ref 11.7–14.2)
SODIUM BLD-SCNC: 137 MMOL/L (ref 136–145)

## 2018-03-18 PROCEDURE — 63710000001 INSULIN ASPART PER 5 UNITS: Performed by: INTERNAL MEDICINE

## 2018-03-18 PROCEDURE — 85610 PROTHROMBIN TIME: CPT | Performed by: NURSE PRACTITIONER

## 2018-03-18 PROCEDURE — 63710000001 INSULIN DETEMER PER 5 UNITS: Performed by: INTERNAL MEDICINE

## 2018-03-18 PROCEDURE — 80048 BASIC METABOLIC PNL TOTAL CA: CPT | Performed by: INTERNAL MEDICINE

## 2018-03-18 PROCEDURE — 71045 X-RAY EXAM CHEST 1 VIEW: CPT

## 2018-03-18 PROCEDURE — 25010000002 DIGOXIN PER 500 MCG: Performed by: NURSE PRACTITIONER

## 2018-03-18 PROCEDURE — 25010000002 PIPERACILLIN SOD-TAZOBACTAM PER 1 G: Performed by: INTERNAL MEDICINE

## 2018-03-18 PROCEDURE — 99231 SBSQ HOSP IP/OBS SF/LOW 25: CPT | Performed by: NURSE PRACTITIONER

## 2018-03-18 PROCEDURE — 82962 GLUCOSE BLOOD TEST: CPT

## 2018-03-18 PROCEDURE — 25010000002 ENOXAPARIN PER 10 MG: Performed by: NURSE PRACTITIONER

## 2018-03-18 RX ORDER — WARFARIN SODIUM 7.5 MG/1
7.5 TABLET ORAL
Status: COMPLETED | OUTPATIENT
Start: 2018-03-18 | End: 2018-03-18

## 2018-03-18 RX ORDER — DIGOXIN 125 MCG
125 TABLET ORAL
Status: DISCONTINUED | OUTPATIENT
Start: 2018-03-19 | End: 2018-03-19 | Stop reason: SDUPTHER

## 2018-03-18 RX ORDER — DIGOXIN 0.25 MG/ML
250 INJECTION INTRAMUSCULAR; INTRAVENOUS ONCE
Status: COMPLETED | OUTPATIENT
Start: 2018-03-18 | End: 2018-03-18

## 2018-03-18 RX ORDER — METOPROLOL TARTRATE 50 MG/1
50 TABLET, FILM COATED ORAL EVERY 12 HOURS SCHEDULED
Status: DISCONTINUED | OUTPATIENT
Start: 2018-03-18 | End: 2018-03-19

## 2018-03-18 RX ORDER — WARFARIN SODIUM 5 MG/1
5 TABLET ORAL
Status: DISCONTINUED | OUTPATIENT
Start: 2018-03-19 | End: 2018-03-20 | Stop reason: HOSPADM

## 2018-03-18 RX ORDER — DIGOXIN 0.25 MG/ML
125 INJECTION INTRAMUSCULAR; INTRAVENOUS ONCE
Status: COMPLETED | OUTPATIENT
Start: 2018-03-18 | End: 2018-03-18

## 2018-03-18 RX ADMIN — DIGOXIN 125 MCG: 0.25 INJECTION INTRAMUSCULAR; INTRAVENOUS at 18:33

## 2018-03-18 RX ADMIN — HYDROCODONE BITARTRATE AND ACETAMINOPHEN 1 TABLET: 5; 325 TABLET ORAL at 09:45

## 2018-03-18 RX ADMIN — DIGOXIN 250 MCG: 0.25 INJECTION INTRAMUSCULAR; INTRAVENOUS at 13:27

## 2018-03-18 RX ADMIN — WARFARIN SODIUM 7.5 MG: 7.5 TABLET ORAL at 17:23

## 2018-03-18 RX ADMIN — PANTOPRAZOLE SODIUM 40 MG: 40 TABLET, DELAYED RELEASE ORAL at 09:51

## 2018-03-18 RX ADMIN — ENOXAPARIN SODIUM 70 MG: 80 INJECTION SUBCUTANEOUS at 21:04

## 2018-03-18 RX ADMIN — LISINOPRIL 2.5 MG: 2.5 TABLET ORAL at 09:45

## 2018-03-18 RX ADMIN — METOPROLOL TARTRATE 50 MG: 50 TABLET, FILM COATED ORAL at 09:45

## 2018-03-18 RX ADMIN — TAZOBACTAM SODIUM AND PIPERACILLIN SODIUM 3.38 G: 375; 3 INJECTION, SOLUTION INTRAVENOUS at 01:54

## 2018-03-18 RX ADMIN — TAZOBACTAM SODIUM AND PIPERACILLIN SODIUM 3.38 G: 375; 3 INJECTION, SOLUTION INTRAVENOUS at 17:22

## 2018-03-18 RX ADMIN — INSULIN ASPART 8 UNITS: 100 INJECTION, SOLUTION INTRAVENOUS; SUBCUTANEOUS at 06:33

## 2018-03-18 RX ADMIN — INSULIN ASPART 12 UNITS: 100 INJECTION, SOLUTION INTRAVENOUS; SUBCUTANEOUS at 17:22

## 2018-03-18 RX ADMIN — INSULIN ASPART 10 UNITS: 100 INJECTION, SOLUTION INTRAVENOUS; SUBCUTANEOUS at 13:26

## 2018-03-18 RX ADMIN — METOPROLOL TARTRATE 50 MG: 50 TABLET, FILM COATED ORAL at 21:03

## 2018-03-18 RX ADMIN — INSULIN DETEMIR 35 UNITS: 100 INJECTION, SOLUTION SUBCUTANEOUS at 21:05

## 2018-03-18 RX ADMIN — INSULIN ASPART 8 UNITS: 100 INJECTION, SOLUTION INTRAVENOUS; SUBCUTANEOUS at 21:05

## 2018-03-18 RX ADMIN — ENOXAPARIN SODIUM 70 MG: 80 INJECTION SUBCUTANEOUS at 09:46

## 2018-03-18 RX ADMIN — METOPROLOL TARTRATE 50 MG: 50 TABLET, FILM COATED ORAL at 01:55

## 2018-03-18 RX ADMIN — TAZOBACTAM SODIUM AND PIPERACILLIN SODIUM 3.38 G: 375; 3 INJECTION, SOLUTION INTRAVENOUS at 09:58

## 2018-03-18 NOTE — PROGRESS NOTES
"   LOS: 6 days    Patient Care Team:  JESUSITA Lam as PCP - General (Family Medicine)    Chief Complaint:    Chief Complaint   Patient presents with   • Cardiac Arrest       Subjective  Follow UP SUHAIL    Interval History:   Doing well. Does c/o pleuritic cp with cough.  Denies shortness of air, no nausea or vomiting, he has increased lower extremity edema L>R.  no dysuria or gross hematuria.    Review of Systems:   As noted above.     Objective     Vital Signs  Temp:  [97.7 °F (36.5 °C)-98.4 °F (36.9 °C)] 98.3 °F (36.8 °C)  Heart Rate:  [] 84  Resp:  [16-18] 16  BP: (103-146)/(77-89) 103/82    Flowsheet Rows    Flowsheet Row First Filed Value   Admission Height 200.7 cm (79\") Documented at 03/11/2018 2344   Admission Weight (!)  141 kg (310 lb) ['s license] Documented at 03/11/2018 2344          No intake/output data recorded.  I/O last 3 completed shifts:  In: 1080 [P.O.:1080]  Out: 4930 [Urine:4930]    Intake/Output Summary (Last 24 hours) at 03/18/18 1302  Last data filed at 03/18/18 0500   Gross per 24 hour   Intake              240 ml   Output             2430 ml   Net            -2190 ml       Physical Exam:  General Appearance: alert, oriented x 3, no acute distress,   Skin: warm and dry  HEENT: Nonicteric sclerae, oral mucosa is dry   Neck: supple, no JVD, trachea midline  Lungs: CTA, unlabored breathing effort  Heart: Irregularly irregular, no rub  Abdomen: soft, non-tender,  present bowel sounds to auscultation  : no palpable bladder,  Extremities: 2+ edema, no cyanosis or clubbing  Neuro: normal speech and mental status       Results Review:      Results from last 7 days  Lab Units 03/18/18  0358 03/17/18  1643 03/17/18  0309 03/16/18  0235  03/15/18  0511   SODIUM mmol/L 137 136 138  138 138  < > 141   POTASSIUM mmol/L 4.1 4.2 3.7  3.7 3.8  < > 3.8   CHLORIDE mmol/L 100 97* 100  100 100  < > 100   CO2 mmol/L 22.6 21.5* 20.5*  20.5* 22.0  < > 20.1*   BUN mg/dL 16 18 17  17 21*  " < > 27*   CREATININE mg/dL 1.21 1.30* 1.12  1.12 1.32*  < > 1.28*   CALCIUM mg/dL 8.3* 8.4* 8.4*  8.4* 8.9  < > 8.5*   BILIRUBIN mg/dL  --  0.7  --  0.9  --  1.3*   ALK PHOS U/L  --  79  --  73  --  66   ALT (SGPT) U/L  --  51*  --  31  --  39   AST (SGOT) U/L  --  55*  --  12  --  14   GLUCOSE mg/dL 352* 429* 221*  221* 251*  < > 289*   < > = values in this interval not displayed.    Estimated Creatinine Clearance: 114.4 mL/min (by C-G formula based on SCr of 1.21 mg/dL).      Results from last 7 days  Lab Units 03/17/18  0309 03/16/18  0235 03/15/18  1436 03/15/18  0511 03/14/18  0451   MAGNESIUM mg/dL 1.8  --   --  1.9 2.1   PHOSPHORUS mg/dL 3.2 3.6 3.4 2.8 2.9         Results from last 7 days  Lab Units 03/17/18  0309 03/13/18  0409   URIC ACID mg/dL 5.0 6.0         Results from last 7 days  Lab Units 03/17/18  0309 03/16/18  0235 03/15/18  0845 03/15/18  0844 03/15/18  0511 03/14/18  0451 03/13/18  0409   WBC 10*3/mm3 10.58 11.50*  --   --  12.16* 13.70* 16.17*   HEMOGLOBIN g/dL 13.5* 12.9*  --   --  12.9* 12.4* 14.3   HEMOGLOBIN, POC g/dL  --   --  12.2 11.9*  --   --   --    PLATELETS 10*3/mm3 217 210  --   --  190 197 240         Results from last 7 days  Lab Units 03/18/18  0358 03/17/18  0309 03/12/18  0413 03/12/18  0019   INR  1.14* 1.06 1.24* 1.29*  1.30*         Imaging Results (last 24 hours)     Procedure Component Value Units Date/Time    XR Chest 1 View [[id number]] Collected:  03/18/18 1026     Updated:  03/18/18 1031    Narrative:       XR CHEST 1 VW-     HISTORY: Male who is 52 years-old,  right-sided pain     TECHNIQUE: Frontal view of the chest     COMPARISON: 3/12/2018     FINDINGS: Previous line and tubes have been removed. Left-sided  generator device and cardiac lead are seen. Heart, mediastinum and  pulmonary vasculature are unremarkable. Atelectasis/infiltrate in the  mid and lower lungs, right more than left. No pleural effusion, or  pneumothorax. No acute osseous process.        Impression:       Atelectasis or infiltrate in the right mid and lower lungs.  Continued follow-up suggested.     This report was finalized on 3/18/2018 10:28 AM by Dr. Pato Terry MD.             digoxin 125 mcg Intravenous Once   digoxin 250 mcg Intravenous Once   [START ON 3/19/2018] digoxin 125 mcg Oral Daily   enoxaparin 0.5 mg/kg Subcutaneous Q12H   insulin aspart 0-14 Units Subcutaneous 4x Daily AC & at Bedtime   insulin detemir 25 Units Subcutaneous Nightly   lisinopril 2.5 mg Oral Q24H   metoprolol tartrate 50 mg Oral Q12H   pantoprazole 40 mg Oral Q AM   piperacillin-tazobactam 3.375 g Intravenous Q8H   [START ON 3/19/2018] warfarin 5 mg Oral Daily   warfarin 7.5 mg Oral Once       sodium chloride 9 mL/hr Last Rate: 9 mL/hr (03/14/18 0830)       Medication Review:   Current Facility-Administered Medications   Medication Dose Route Frequency Provider Last Rate Last Dose   • acetaminophen (TYLENOL) tablet 650 mg  650 mg Oral Q6H PRN Linda Blank MD   650 mg at 03/14/18 1135   • dextrose (D50W) solution 25 g  25 g Intravenous Q15 Min PRN Linda Blank MD       • dextrose (GLUTOSE) oral gel 15 g  15 g Oral Q15 Min PRN Linda Blank MD       • digoxin (LANOXIN) injection 125 mcg  125 mcg Intravenous Once Leanna German APRN       • digoxin (LANOXIN) injection 250 mcg  250 mcg Intravenous Once Leanna German APRN       • [START ON 3/19/2018] digoxin (LANOXIN) tablet 125 mcg  125 mcg Oral Daily Leanna German APRZOEY       • enoxaparin (LOVENOX) syringe 70 mg  0.5 mg/kg Subcutaneous Q12H JESUSITA Miller   70 mg at 03/18/18 0946   • glucagon (human recombinant) (GLUCAGEN DIAGNOSTIC) injection 1 mg  1 mg Subcutaneous PRN Linda Blank MD       • HYDROcodone-acetaminophen (NORCO) 5-325 MG per tablet 1 tablet  1 tablet Oral Q4H PRN Valentin Moya MD   1 tablet at 03/18/18 0945   • HYDROmorphone (DILAUDID) injection 0.5 mg  0.5 mg Intravenous Q2H PRN JESUSITA Miller        And   • naloxone (NARCAN)  injection 0.4 mg  0.4 mg Intravenous Q5 Min PRN JESUSITA Miller       • insulin aspart (novoLOG) injection 0-14 Units  0-14 Units Subcutaneous 4x Daily AC & at Bedtime Linda Blank MD   8 Units at 03/18/18 0633   • insulin detemir (LEVEMIR) injection 25 Units  25 Units Subcutaneous Nightly Mark Anthony Mackenzie MD   25 Units at 03/17/18 2123   • lisinopril (PRINIVIL,ZESTRIL) tablet 2.5 mg  2.5 mg Oral Q24H Valentin Moya MD   2.5 mg at 03/18/18 0945   • loperamide (IMODIUM) capsule 2 mg  2 mg Oral 4x Daily PRN Linda Blank MD   2 mg at 03/14/18 1021   • metoprolol tartrate (LOPRESSOR) tablet 50 mg  50 mg Oral Q12H JESUSITA Pimentel       • morphine injection 1 mg  1 mg Intravenous Q4H PRN Valentin Moya MD        And   • naloxone (NARCAN) injection 0.4 mg  0.4 mg Intravenous Q5 Min PRN Valentin Moya MD       • ondansetron (ZOFRAN) tablet 4 mg  4 mg Oral Q6H PRN Valentin Moya MD        Or   • ondansetron ODT (ZOFRAN-ODT) disintegrating tablet 4 mg  4 mg Oral Q6H PRN Valentin Moya MD        Or   • ondansetron (ZOFRAN) injection 4 mg  4 mg Intravenous Q6H PRN Valentin Moya MD       • ondansetron (ZOFRAN) injection 4 mg  4 mg Intravenous Q6H PRN JESUSITA Miller       • pantoprazole (PROTONIX) EC tablet 40 mg  40 mg Oral Q AM Linda Blank MD   40 mg at 03/18/18 0951   • piperacillin-tazobactam (ZOSYN) 3.375 g in iso-osmotic dextrose 50 ml (premix)  3.375 g Intravenous Q8H Linda Blank MD   3.375 g at 03/18/18 0958   • sodium chloride 0.9 % flush 1-10 mL  1-10 mL Intravenous PRN JESUSITA Miller       • sodium chloride 0.9 % flush 10 mL  10 mL Intravenous PRN Jimmy Rodrigez MD       • sodium chloride 0.9 % infusion  9 mL/hr Intravenous Continuous Linda Blank MD 9 mL/hr at 03/14/18 0830 9 mL/hr at 03/14/18 0830   • [START ON 3/19/2018] warfarin (COUMADIN) tablet 5 mg  5 mg Oral Daily JESUSITA Pimentel       • warfarin (COUMADIN) tablet 7.5 mg  7.5 mg Oral Once JESUSITA Pimentel            Assessment/Plan   1. SUHAIL  associated with  cardiac arrest.  No baseline renal function available at this time. Creatinine continues to trend down. Creatinine 1.2mg/dl  2.  SP Vfib arrest , resuscitated. Severe non-ischemic cardiomyopathy ,s/p AICD 3/16/18  3.  Polysubstance abuse, cocaine  4.  Diabetes mellitus type II    5.  Atrial fibrillation with controlled rate   6. Metabolic acidosis.  Resolved  7. Diarrhea being evaluated       Plan:  1.  Continue the same treatment.  2.  Surveillance labs         Ju Barahona MD  03/18/18  1:02 PM

## 2018-03-18 NOTE — PROGRESS NOTES
[facility]         LOS: 5 days   Patient Care Team:  JESUSITA Lam as PCP - General (Family Medicine)    Chief Complaint: Resuscitated V. fib arrest in the setting of positive drug screen.  Persistent lactic acidosis and acute kidney injury and hyperkalemia.    Interval History:   Some soreness in chest still.  No palpitation. No worsneing soa.  Some burning at site of incision. No drianage  Vital Signs  Temp:  [97.6 °F (36.4 °C)-98.3 °F (36.8 °C)] 98.3 °F (36.8 °C)  Heart Rate:  [] 96  Resp:  [16-18] 16  BP: (114-139)/(80-84) 117/84    Intake/Output Summary (Last 24 hours) at 03/17/18 2015  Last data filed at 03/17/18 1740   Gross per 24 hour   Intake             1080 ml   Output             2500 ml   Net            -1420 ml         Physical Exam:   General Appearance:    Awake alert no distress.  No issues reported.     Lungs:     Equal breath sounds without rhonchi and crackles on the bases.      Heart:    Regular rhythm and normal rate, normal S1 and S2, no            murmur, no gallop, no rub, no click   Chest Wall:    No abnormalities observed   Abdomen:    Soft mildly distended.  Diffuse tenderness no rebound or guarding.     Extremities:   Moves all extremities well, 1+ edema, no cyanosis, no             redness     Results Review:          Results from last 7 days  Lab Units 03/17/18  1643 03/17/18  0309 03/16/18  0235   SODIUM mmol/L 136 138  138 138   POTASSIUM mmol/L 4.2 3.7  3.7 3.8   CHLORIDE mmol/L 97* 100  100 100   CO2 mmol/L 21.5* 20.5*  20.5* 22.0   BUN mg/dL 18 17  17 21*   CREATININE mg/dL 1.30* 1.12  1.12 1.32*   GLUCOSE mg/dL 429* 221*  221* 251*   CALCIUM mg/dL 8.4* 8.4*  8.4* 8.9       Results from last 7 days  Lab Units 03/13/18  0409 03/12/18  0019   TROPONIN T ng/mL 0.131* <0.010       Results from last 7 days  Lab Units 03/17/18  0309 03/16/18  0235 03/15/18  0845  03/15/18  0511   WBC 10*3/mm3 10.58 11.50*  --   --  12.16*   HEMOGLOBIN g/dL  13.5* 12.9*  --   --  12.9*   HEMOGLOBIN, POC g/dL  --   --  12.2  < >  --    HEMATOCRIT % 40.9 40.0*  --   --  39.8*   HEMATOCRIT POC %  --   --  36*  < >  --    PLATELETS 10*3/mm3 217 210  --   --  190   < > = values in this interval not displayed.    Results from last 7 days  Lab Units 03/17/18  0309 03/12/18  0413 03/12/18  0019   INR  1.06 1.24* 1.29*  1.30*   APTT seconds  --   --  35.6*  35.4       Results from last 7 days  Lab Units 03/17/18  1643   CHOLESTEROL mg/dL 136       Results from last 7 days  Lab Units 03/17/18  0309   MAGNESIUM mg/dL 1.8       Results from last 7 days  Lab Units 03/17/18  1643   CHOLESTEROL mg/dL 136   TRIGLYCERIDES mg/dL 183*   HDL CHOL mg/dL 34*   LDL CHOL mg/dL 65       Results from last 7 days  Lab Units 03/13/18  1132   PH, ARTERIAL pH units 7.417   PO2 ART mm Hg 74.0*   PCO2, ARTERIAL mm Hg 35.0   HCO3 ART mmol/L 22.6       I reviewed the patient's new clinical results.  I personally viewed and interpreted the patient's CXR        Medication Review:     enoxaparin 0.5 mg/kg Subcutaneous Q12H   insulin aspart 0-14 Units Subcutaneous 4x Daily AC & at Bedtime   insulin detemir 25 Units Subcutaneous Nightly   lisinopril 2.5 mg Oral Q24H   metoprolol tartrate 50 mg Oral Q6H   pantoprazole 40 mg Oral Q AM   piperacillin-tazobactam 3.375 g Intravenous Q8H   warfarin 5 mg Oral Daily         sodium chloride 9 mL/hr Last Rate: 9 mL/hr (03/14/18 0830)       ASSESSMENT:   Resuscitated V. fib arrest s/p aicd  Severe nonischemic cardiomyopathy  Acute respiratory failure, mechanical ventilation  Aspiration pneumonia  Sinus tachycardia  Atrial fibrillation presumed new onset  Positive UDS  Acute kidney injury with severe hyperkalemia imrpoved        PLAN:  Status post cardiac catheter.  NotedSevere nonischemic cardiomyopathy.    aicd  Ac bridge  Pain meds  Insulin adjusted up  Remains on full dose anticoagulation per cardiology.   Wean oxygen as tolerated.  Continue Zosyn for possible  aspiration pneumonia. Done tomorrow  Hemoglobin stable.  No further evidence of GI bleed.  Plans per GI for outpatient colonoscopy.      Mark Anthony Mackenzie MD  03/17/18  8:15 PM

## 2018-03-18 NOTE — PROGRESS NOTES
[facility]          LOS: 6 days   Patient Care Team:  JESUSITA Lam as PCP - General (Family Medicine)    Chief Complaint: Resuscitated V. fib arrest in the setting of positive drug screen.  Persistent lactic acidosis and acute kidney injury and hyperkalemia.    Interval History:   Has been overly focused on his sugars today and has been checking sugars one hour after eating. Still sore from compressions but improved    Vital Signs  Temp:  [97.5 °F (36.4 °C)-98.4 °F (36.9 °C)] 97.5 °F (36.4 °C)  Heart Rate:  [] 98  Resp:  [16-18] 18  BP: (103-146)/(77-89) 105/82    Intake/Output Summary (Last 24 hours) at 03/18/18 1708  Last data filed at 03/18/18 1323   Gross per 24 hour   Intake              480 ml   Output             2430 ml   Net            -1950 ml     Physical Exam:   General Appearance:    Awake alert no distress.  No issues reported.     Lungs:     Equal breath sounds without rhonchi and crackles on the bases.      Heart:    Regular rhythm and normal rate, normal S1 and S2, no            murmur, no gallop, no rub, no click   Chest Wall:    No abnormalities observed   Abdomen:    Soft mildly distended.  Diffuse tenderness no rebound or guarding.     Extremities:   Moves all extremities well, trace edema, no cyanosis, no             redness     Results Review:      Results from last 7 days  Lab Units 03/17/18  0309 03/16/18  0235 03/15/18  0845 03/15/18  0844 03/15/18  0511 03/14/18  0451 03/13/18  0409  03/12/18  0413 03/12/18  0019   WBC 10*3/mm3 10.58 11.50*  --   --  12.16* 13.70* 16.17*  --  27.27* 17.04*  16.06*   HEMOGLOBIN g/dL 13.5* 12.9*  --   --  12.9* 12.4* 14.3  < > 16.7 15.3  15.6   HEMOGLOBIN, POC g/dL  --   --  12.2 11.9*  --   --   --   --   --   --    PLATELETS 10*3/mm3 217 210  --   --  190 197 240  --  294 265  256   < > = values in this interval not displayed.  Results from last 7 days  Lab Units 03/18/18  0358 03/17/18  1643 03/17/18  0301  03/16/18  0235 03/15/18  1436 03/15/18  0511 03/14/18  0451 03/13/18  0409 03/12/18  1811  03/12/18  0019   SODIUM mmol/L 137 136 138  138 138 140 141 144 143 136  < > 137   POTASSIUM mmol/L 4.1 4.2 3.7  3.7 3.8 4.2 3.8 4.1 3.6 4.0  < > 4.3   CHLORIDE mmol/L 100 97* 100  100 100 99 100 107 105 100  < > 94*   CO2 mmol/L 22.6 21.5* 20.5*  20.5* 22.0 17.7* 20.1* 22.6 23.6 20.7*  < > 16.7*   BUN mg/dL 16 18 17  17 21* 26* 27* 46* 43* 46*  < > 23*   CREATININE mg/dL 1.21 1.30* 1.12  1.12 1.32* 1.38* 1.28* 1.76* 1.94* 2.39*  < > 1.93*   GLUCOSE mg/dL 352* 429* 221*  221* 251* 335* 289* 252* 140* 274*  < > 552*   CALCIUM mg/dL 8.3* 8.4* 8.4*  8.4* 8.9 8.5* 8.5* 8.0* 7.7* 8.1*  < > 8.5*   MAGNESIUM mg/dL  --   --  1.8  --   --  1.9 2.1 2.1  --   --  2.9*   PHOSPHORUS mg/dL  --   --  3.2 3.6 3.4 2.8 2.9 4.3 3.3  < > 9.0*   < > = values in this interval not displayed.Estimated Creatinine Clearance: 114.4 mL/min (by C-G formula based on SCr of 1.21 mg/dL).    I reviewed the patient's new clinical results.  I personally viewed and interpreted the patient's CXR        Medication Review:     digoxin 125 mcg Intravenous Once   [START ON 3/19/2018] digoxin 125 mcg Oral Daily   enoxaparin 0.5 mg/kg Subcutaneous Q12H   insulin aspart 0-14 Units Subcutaneous 4x Daily AC & at Bedtime   insulin detemir 35 Units Subcutaneous Nightly   lisinopril 2.5 mg Oral Q24H   metoprolol tartrate 50 mg Oral Q12H   pantoprazole 40 mg Oral Q AM   piperacillin-tazobactam 3.375 g Intravenous Q8H   [START ON 3/19/2018] warfarin 5 mg Oral Daily   warfarin 7.5 mg Oral Once         sodium chloride 9 mL/hr Last Rate: 9 mL/hr (03/14/18 0830)       ASSESSMENT:   Resuscitated V. fib arrest s/p aicd  Severe nonischemic cardiomyopathy  Acute respiratory failure, mechanical ventilation  Aspiration pneumonia  Sinus tachycardia  Atrial fibrillation presumed new onset  Positive UDS  Acute kidney injury with severe hyperkalemia imrpoved    PLAN:  Status post  cardiac catheter.  - severe nonischemic cardiomyopathy.    aicd  Ac bridge - loenox to coumadin mgmt per cards  Pain meds  Insulin adjusted up again tonight long acting increased to 35 again, advised patient that checking the blood sugar one hour after eating doesn't give his insulin time to work.  Patient obviously needs significant dm education and his hga1c reflect poor control.  Ill have endocrinology come see him   Continue Zosyn for possible aspiration pneumonia - last dose today  Hemoglobin stable.  No further evidence of GI bleed.  Plans per GI for outpatient colonoscopy.      Mark Anthony Mackenzie MD  03/18/18  5:08 PM

## 2018-03-18 NOTE — PLAN OF CARE
Problem: Patient Care Overview  Goal: Plan of Care Review  Outcome: Ongoing (interventions implemented as appropriate)   03/17/18 5910   OTHER   Outcome Summary aicd site soft clean and dry. no c/o at this time. IV ABX given. will continue to monitor.    Coping/Psychosocial   Plan of Care Reviewed With patient   Plan of Care Review   Progress no change   Coping/Psychosocial   Patient Agreement with Plan of Care agrees

## 2018-03-18 NOTE — PLAN OF CARE
Problem: Pain, Acute (Adult)  Goal: Identify Related Risk Factors and Signs and Symptoms  Outcome: Ongoing (interventions implemented as appropriate)   03/17/18 4319   Pain, Acute (Adult)   Related Risk Factors (Acute Pain) patient perception;positioning   Signs and Symptoms (Acute Pain) verbalization of pain descriptors

## 2018-03-18 NOTE — PROGRESS NOTES
"Shannan Arguelles  1965 52 y.o.  [id number]      Patient Care Team:  JESUSITA Lam as PCP - General (Family Medicine)    CC: Cardiac arrest, severe cardiomyopathy, hypertension, cocaine abuse, atrial fib RVR    Interval History:     Reports nonradiating, right sided chest discomfort associated with deep breath and cough \"not as bad today\".  Denies productive cough.  Denies dyspnea on exertion, hemoptysis.    CXR=>Atelectasis or infiltrate in the right mid and lower lungs.  Encouraged CDB, ambulating.     VR 90's at rest on q6hr Lopressor  -140s/ 70-80s    INR 1.14  Remains on full dose Lovenox bridge therapy      Objective   Vital Signs  Temp:  [97.7 °F (36.5 °C)-98.4 °F (36.9 °C)] 98.3 °F (36.8 °C)  Heart Rate:  [] 84  Resp:  [16-18] 16  BP: (103-146)/(77-89) 103/82    Intake/Output Summary (Last 24 hours) at 03/18/18 1200  Last data filed at 03/18/18 0500   Gross per 24 hour   Intake              480 ml   Output             3930 ml   Net            -3450 ml     Flowsheet Rows    Flowsheet Row First Filed Value   Admission Height 200.7 cm (79\") Documented at 03/11/2018 2344   Admission Weight (!)  141 kg (310 lb) ['s license] Documented at 03/11/2018 2344          Physical Exam:   General Appearance:    Alert,oriented, in no acute distress   Lungs:     Respirations are regular and unlabored at rest on room air.  Bilateral breath sounds have good air entry in all lung fields.  No crackles, wheezes or crepitus.  Well healing left subclavian procedure site-no oozing or erythema     Heart:    Normal S1 and S2, Irregular rate and rhythm without murmur, gallop or rub.  No pretibial pitting edema    HEENT:    PERTL Sclera are clear, no JVD   Abdomen:     Normal bowel sounds, abdomen soft non-tender, non-distended       CXR 3-18-18 IMPRESSION:  Atelectasis or infiltrate in the right mid and lower lungs.  Continued follow-up suggested.      Tele          Medication Review:        enoxaparin 0.5 " mg/kg Subcutaneous Q12H   insulin aspart 0-14 Units Subcutaneous 4x Daily AC & at Bedtime   insulin detemir 25 Units Subcutaneous Nightly   lisinopril 2.5 mg Oral Q24H   metoprolol tartrate 50 mg Oral Q6H   pantoprazole 40 mg Oral Q AM   piperacillin-tazobactam 3.375 g Intravenous Q8H   warfarin 5 mg Oral Daily       sodium chloride 9 mL/hr Last Rate: 9 mL/hr (03/14/18 0830)         I reviewed the patient's new clinical results.  I personally viewed and interpreted the patient's EKG/Telemetry data    Assessment/Plan  - Cocaine abuse  - Status post cardiac arrest  - Severe NICM   - s/p ICD  - afib rvr   - diabetes   - hypertension     Ventricular rate improved-but doubt he will take beta blocker every 6 hours after discharge.  Will change to twice a day Lopressor and add digoxin today.  Continue Lovenox until INR  >/= 2.0.  Patient's primary cardiologist, Dr. Moya to assume care tomorrow    Labs/tests ordered: Medication adjustment,  a.m. INR       Leanna German, JESUSITA  03/18/18  12:00 PM

## 2018-03-19 LAB
ALBUMIN UR-MCNC: <1.2 MG/L
ANION GAP SERPL CALCULATED.3IONS-SCNC: 12.6 MMOL/L
BUN BLD-MCNC: 14 MG/DL (ref 6–20)
BUN/CREAT SERPL: 12.5 (ref 7–25)
CALCIUM SPEC-SCNC: 8.5 MG/DL (ref 8.6–10.5)
CHLORIDE SERPL-SCNC: 100 MMOL/L (ref 98–107)
CO2 SERPL-SCNC: 25.4 MMOL/L (ref 22–29)
CREAT BLD-MCNC: 1.12 MG/DL (ref 0.76–1.27)
CREAT UR-MCNC: 92.8 MG/DL
FOLATE BLD-MCNC: 461.2 NG/ML
FOLATE RBC-MCNC: 1119 NG/ML
GFR SERPL CREATININE-BSD FRML MDRD: 69 ML/MIN/1.73
GLUCOSE BLD-MCNC: 243 MG/DL (ref 65–99)
GLUCOSE BLDC GLUCOMTR-MCNC: 208 MG/DL (ref 70–130)
GLUCOSE BLDC GLUCOMTR-MCNC: 253 MG/DL (ref 70–130)
GLUCOSE BLDC GLUCOMTR-MCNC: 254 MG/DL (ref 70–130)
GLUCOSE BLDC GLUCOMTR-MCNC: 312 MG/DL (ref 70–130)
HCT VFR BLD AUTO: 41.2 % (ref 37.5–51)
INR PPP: 1.4 (ref 0.9–1.1)
MICROALBUMIN/CREAT UR: NORMAL MG/G
POTASSIUM BLD-SCNC: 3.5 MMOL/L (ref 3.5–5.2)
PROTHROMBIN TIME: 17 SECONDS (ref 11.7–14.2)
SODIUM BLD-SCNC: 138 MMOL/L (ref 136–145)

## 2018-03-19 PROCEDURE — 25010000002 ENOXAPARIN PER 10 MG: Performed by: NURSE PRACTITIONER

## 2018-03-19 PROCEDURE — 63710000001 INSULIN DETEMER PER 5 UNITS: Performed by: INTERNAL MEDICINE

## 2018-03-19 PROCEDURE — 63710000001 INSULIN ASPART PER 5 UNITS: Performed by: INTERNAL MEDICINE

## 2018-03-19 PROCEDURE — 80048 BASIC METABOLIC PNL TOTAL CA: CPT | Performed by: INTERNAL MEDICINE

## 2018-03-19 PROCEDURE — 99254 IP/OBS CNSLTJ NEW/EST MOD 60: CPT | Performed by: INTERNAL MEDICINE

## 2018-03-19 PROCEDURE — 85610 PROTHROMBIN TIME: CPT | Performed by: NURSE PRACTITIONER

## 2018-03-19 PROCEDURE — 25010000002 PIPERACILLIN SOD-TAZOBACTAM PER 1 G: Performed by: INTERNAL MEDICINE

## 2018-03-19 PROCEDURE — 82570 ASSAY OF URINE CREATININE: CPT | Performed by: INTERNAL MEDICINE

## 2018-03-19 PROCEDURE — 82043 UR ALBUMIN QUANTITATIVE: CPT | Performed by: INTERNAL MEDICINE

## 2018-03-19 PROCEDURE — 82962 GLUCOSE BLOOD TEST: CPT

## 2018-03-19 PROCEDURE — 99232 SBSQ HOSP IP/OBS MODERATE 35: CPT | Performed by: INTERNAL MEDICINE

## 2018-03-19 RX ORDER — DIGOXIN 250 MCG
250 TABLET ORAL
Status: DISCONTINUED | OUTPATIENT
Start: 2018-03-19 | End: 2018-03-20 | Stop reason: HOSPADM

## 2018-03-19 RX ORDER — FUROSEMIDE 40 MG/1
40 TABLET ORAL DAILY
Status: DISCONTINUED | OUTPATIENT
Start: 2018-03-19 | End: 2018-03-20 | Stop reason: HOSPADM

## 2018-03-19 RX ORDER — POTASSIUM CHLORIDE 1.5 G/1.77G
40 POWDER, FOR SOLUTION ORAL AS NEEDED
Status: DISCONTINUED | OUTPATIENT
Start: 2018-03-19 | End: 2018-03-20 | Stop reason: HOSPADM

## 2018-03-19 RX ORDER — POTASSIUM CHLORIDE 750 MG/1
40 CAPSULE, EXTENDED RELEASE ORAL AS NEEDED
Status: DISCONTINUED | OUTPATIENT
Start: 2018-03-19 | End: 2018-03-20 | Stop reason: HOSPADM

## 2018-03-19 RX ORDER — POTASSIUM CHLORIDE 7.45 MG/ML
10 INJECTION INTRAVENOUS
Status: DISCONTINUED | OUTPATIENT
Start: 2018-03-19 | End: 2018-03-20 | Stop reason: HOSPADM

## 2018-03-19 RX ORDER — POTASSIUM CHLORIDE 1.5 G/1.77G
40 POWDER, FOR SOLUTION ORAL ONCE
Status: DISCONTINUED | OUTPATIENT
Start: 2018-03-19 | End: 2018-03-20 | Stop reason: HOSPADM

## 2018-03-19 RX ADMIN — INSULIN ASPART 10 UNITS: 100 INJECTION, SOLUTION INTRAVENOUS; SUBCUTANEOUS at 16:33

## 2018-03-19 RX ADMIN — FUROSEMIDE 40 MG: 40 TABLET ORAL at 07:47

## 2018-03-19 RX ADMIN — PANTOPRAZOLE SODIUM 40 MG: 40 TABLET, DELAYED RELEASE ORAL at 06:40

## 2018-03-19 RX ADMIN — TAZOBACTAM SODIUM AND PIPERACILLIN SODIUM 3.38 G: 375; 3 INJECTION, SOLUTION INTRAVENOUS at 01:24

## 2018-03-19 RX ADMIN — WARFARIN SODIUM 5 MG: 5 TABLET ORAL at 17:42

## 2018-03-19 RX ADMIN — LISINOPRIL 2.5 MG: 2.5 TABLET ORAL at 07:48

## 2018-03-19 RX ADMIN — METOPROLOL TARTRATE 75 MG: 25 TABLET ORAL at 20:47

## 2018-03-19 RX ADMIN — POTASSIUM CHLORIDE 40 MEQ: 750 CAPSULE, EXTENDED RELEASE ORAL at 06:40

## 2018-03-19 RX ADMIN — TAZOBACTAM SODIUM AND PIPERACILLIN SODIUM 3.38 G: 375; 3 INJECTION, SOLUTION INTRAVENOUS at 09:27

## 2018-03-19 RX ADMIN — INSULIN ASPART 7 UNITS: 100 INJECTION, SOLUTION INTRAVENOUS; SUBCUTANEOUS at 13:43

## 2018-03-19 RX ADMIN — ENOXAPARIN SODIUM 70 MG: 80 INJECTION SUBCUTANEOUS at 20:45

## 2018-03-19 RX ADMIN — ENOXAPARIN SODIUM 70 MG: 80 INJECTION SUBCUTANEOUS at 07:48

## 2018-03-19 RX ADMIN — INSULIN ASPART 8 UNITS: 100 INJECTION, SOLUTION INTRAVENOUS; SUBCUTANEOUS at 16:33

## 2018-03-19 RX ADMIN — INSULIN ASPART 5 UNITS: 100 INJECTION, SOLUTION INTRAVENOUS; SUBCUTANEOUS at 06:40

## 2018-03-19 RX ADMIN — INSULIN ASPART 8 UNITS: 100 INJECTION, SOLUTION INTRAVENOUS; SUBCUTANEOUS at 20:46

## 2018-03-19 RX ADMIN — DIGOXIN 250 MCG: 250 TABLET ORAL at 13:07

## 2018-03-19 RX ADMIN — INSULIN DETEMIR 10 UNITS: 100 INJECTION, SOLUTION SUBCUTANEOUS at 09:25

## 2018-03-19 RX ADMIN — METOPROLOL TARTRATE 25 MG: 25 TABLET ORAL at 09:26

## 2018-03-19 RX ADMIN — METOPROLOL TARTRATE 50 MG: 50 TABLET, FILM COATED ORAL at 07:47

## 2018-03-19 NOTE — PROGRESS NOTES
[facility]          LOS: 7 days   Patient Care Team:  JESUSITA Lam as PCP - General (Family Medicine)    Chief Complaint: Resuscitated V. fib arrest in the setting of positive drug screen.  Persistent lactic acidosis and acute kidney injury and hyperkalemia.    Interval History:   Doing well today.  Up in chair  No soa.  Laughing talking with family    Vital Signs  Temp:  [97.7 °F (36.5 °C)-98.3 °F (36.8 °C)] 98.3 °F (36.8 °C)  Heart Rate:  [79-98] 88  Resp:  [16-20] 18  BP: (109-158)/(66-82) 110/72    Intake/Output Summary (Last 24 hours) at 03/19/18 1703  Last data filed at 03/19/18 1306   Gross per 24 hour   Intake             1075 ml   Output             2030 ml   Net             -955 ml     Physical Exam:   General Appearance:    Awake alert no distress.    Lungs:     Equal breath sounds without rhonchi no crackles on the bases.      Heart:    Regular rhythm and normal rate, normal S1 and S2, no            murmur, no gallop, no rub, no click   Chest Wall:    No abnormalities observed   Abdomen:    Soft mildly distended.  Diffuse tenderness no rebound or guarding.     Extremities:   Moves all extremities well, trace edema, no cyanosis, no             redness     Results Review:      Results from last 7 days  Lab Units 03/17/18  0309 03/16/18  0235 03/15/18  0845 03/15/18  0844 03/15/18  0511 03/14/18  0451 03/13/18  0409   WBC 10*3/mm3 10.58 11.50*  --   --  12.16* 13.70* 16.17*   HEMOGLOBIN g/dL 13.5* 12.9*  --   --  12.9* 12.4* 14.3   HEMOGLOBIN, POC g/dL  --   --  12.2 11.9*  --   --   --    PLATELETS 10*3/mm3 217 210  --   --  190 197 240       Results from last 7 days  Lab Units 03/19/18  0312 03/18/18  0358 03/17/18  1643 03/17/18  0309 03/16/18  0235 03/15/18  1436 03/15/18  0511 03/14/18  0451 03/13/18  0409 03/12/18  1811   SODIUM mmol/L 138 137 136 138  138 138 140 141 144 143 136   POTASSIUM mmol/L 3.5 4.1 4.2 3.7  3.7 3.8 4.2 3.8 4.1 3.6 4.0   CHLORIDE mmol/L 100 100  97* 100  100 100 99 100 107 105 100   CO2 mmol/L 25.4 22.6 21.5* 20.5*  20.5* 22.0 17.7* 20.1* 22.6 23.6 20.7*   BUN mg/dL 14 16 18 17  17 21* 26* 27* 46* 43* 46*   CREATININE mg/dL 1.12 1.21 1.30* 1.12  1.12 1.32* 1.38* 1.28* 1.76* 1.94* 2.39*   GLUCOSE mg/dL 243* 352* 429* 221*  221* 251* 335* 289* 252* 140* 274*   CALCIUM mg/dL 8.5* 8.3* 8.4* 8.4*  8.4* 8.9 8.5* 8.5* 8.0* 7.7* 8.1*   MAGNESIUM mg/dL  --   --   --  1.8  --   --  1.9 2.1 2.1  --    PHOSPHORUS mg/dL  --   --   --  3.2 3.6 3.4 2.8 2.9 4.3 3.3   Estimated Creatinine Clearance: 123.6 mL/min (by C-G formula based on SCr of 1.12 mg/dL).    I reviewed the patient's new clinical results.  I personally viewed and interpreted the patient's CXR        Medication Review:     digoxin 250 mcg Oral Daily   enoxaparin 0.5 mg/kg Subcutaneous Q12H   furosemide 40 mg Oral Daily   insulin aspart 0-14 Units Subcutaneous 4x Daily AC & at Bedtime   insulin aspart 8 Units Subcutaneous TID With Meals   insulin detemir 45 Units Subcutaneous Nightly   lisinopril 2.5 mg Oral Q24H   metoprolol tartrate 75 mg Oral Q12H   pantoprazole 40 mg Oral Q AM   piperacillin-tazobactam 3.375 g Intravenous Q8H   potassium chloride 40 mEq Oral Once   warfarin 5 mg Oral Daily         sodium chloride 9 mL/hr Last Rate: 9 mL/hr (03/14/18 0830)       ASSESSMENT:   Resuscitated V. fib arrest s/p aicd  Severe nonischemic cardiomyopathy  Acute respiratory failure, mechanical ventilation  Aspiration pneumonia  Sinus tachycardia  Atrial fibrillation presumed new onset  Positive UDS  Acute kidney injury with severe hyperkalemia imrpoved    PLAN:  Will have inr check arranged with [facility] and lovenox bridge.  Dc in am      Mark Anthony Mackenzie MD  03/19/18  5:03 PM

## 2018-03-19 NOTE — PROGRESS NOTES
Continued Stay Note  [facility]     Patient Name: Shannan Arguelles  MRN: [id number]  Today's Date: 3/19/2018    Admit Date: 3/11/2018          Discharge Plan     Row Name 03/19/18 1119       Plan    Plan Home with family.    Plan Comments [facility] received a voice mail from Jessica with [employer] in regards to Pt having no insurance.  Jessica advised Pt might be eligible for the Healthy [place] Plan however, [facility] is not able to initiate the paper work for this particular plan.  SHANTI relayed this information to Pt at bedside with the address and phone number of his local Medicaid office in [place].  Pt verbalized understanding.  Pt was previously given financial assistance form and GoodRx card on Friday by SHANTI Pearson.  CCP following.  ARIANNE Drake RN              Discharge Codes    No documentation.           Tamy Drake RN

## 2018-03-19 NOTE — PLAN OF CARE
Problem: Fall Risk (Adult)  Goal: Identify Related Risk Factors and Signs and Symptoms  Outcome: Ongoing (interventions implemented as appropriate)   03/17/18 2240   Fall Risk (Adult)   Related Risk Factors (Fall Risk) environment unfamiliar   Signs and Symptoms (Fall Risk) presence of risk factors       Problem: Skin Injury Risk (Adult)  Goal: Identify Related Risk Factors and Signs and Symptoms  Outcome: Ongoing (interventions implemented as appropriate)   03/19/18 0409   Skin Injury Risk (Adult)   Related Risk Factors (Skin Injury Risk) body weight extremes;edema;infection     Goal: Skin Health and Integrity  Outcome: Ongoing (interventions implemented as appropriate)   03/19/18 0409   Skin Injury Risk (Adult)   Skin Health and Integrity making progress toward outcome       Problem: Patient Care Overview  Goal: Plan of Care Review  Outcome: Ongoing (interventions implemented as appropriate)   03/17/18 2240 03/19/18 0409   OTHER   Outcome Summary aicd site soft clean and dry. no c/o at this time. IV ABX given. will continue to monitor.  --    Coping/Psychosocial   Plan of Care Reviewed With --  patient   Plan of Care Review   Progress --  improving     Goal: Individualization and Mutuality  Outcome: Ongoing (interventions implemented as appropriate)      Problem: Pain, Acute (Adult)  Goal: Identify Related Risk Factors and Signs and Symptoms  Outcome: Ongoing (interventions implemented as appropriate)   03/17/18 2241 03/19/18 0409   Pain, Acute (Adult)   Related Risk Factors (Acute Pain) --  patient perception;positioning   Signs and Symptoms (Acute Pain) verbalization of pain descriptors --      Goal: Acceptable Pain Control/Comfort Level  Outcome: Ongoing (interventions implemented as appropriate)   03/19/18 0409   Pain, Acute (Adult)   Acceptable Pain Control/Comfort Level making progress toward outcome

## 2018-03-19 NOTE — PROGRESS NOTES
"Shannan A Blane  1965 52 y.o.  [id number]      Patient Care Team:  JESUSITA Lam as PCP - General (Family Medicine)    CC: Cardiac arrest, severe cardiomyopathy, diabetes, hypertension, cocaine abuse    Interval History: He's doing pretty well no shortness of breath    Objective   Vital Signs  Temp:  [97.5 °F (36.4 °C)-98.3 °F (36.8 °C)] 97.7 °F (36.5 °C)  Heart Rate:  [] 98  Resp:  [16-20] 16  BP: (103-158)/(66-82) 119/82    Intake/Output Summary (Last 24 hours) at 03/19/18 0900  Last data filed at 03/19/18 0700   Gross per 24 hour   Intake             1265 ml   Output             1200 ml   Net               65 ml     Flowsheet Rows    Flowsheet Row First Filed Value   Admission Height 200.7 cm (79\") Documented at 03/11/2018 2344   Admission Weight (!)  141 kg (310 lb) ['s license] Documented at 03/11/2018 2344          Physical Exam:   General Appearance:    Alert,oriented, in no acute distress   Lungs:     Clear to auscultation,BS are equal    Heart:    Normal S1 and S2, iRRR without murmur, gallop or rub   HEENT:    Sclera are clear, no JVD or adenopathy   Abdomen:     Normal bowel sounds, soft non-tender, non-distended, no HSM   Extremities:   Moves all extremities well, no edema, no cyanosis, no             Redness, no rash     Medication Review:        digoxin 250 mcg Oral Daily   enoxaparin 0.5 mg/kg Subcutaneous Q12H   furosemide 40 mg Oral Daily   insulin aspart 0-14 Units Subcutaneous 4x Daily AC & at Bedtime   insulin aspart 8 Units Subcutaneous TID With Meals   insulin detemir 10 Units Subcutaneous Once   insulin detemir 40 Units Subcutaneous Nightly   lisinopril 2.5 mg Oral Q24H   metoprolol tartrate 75 mg Oral Q12H   pantoprazole 40 mg Oral Q AM   piperacillin-tazobactam 3.375 g Intravenous Q8H   potassium chloride 40 mEq Oral Once   warfarin 5 mg Oral Daily       sodium chloride 9 mL/hr Last Rate: 9 mL/hr (03/14/18 0830)         I reviewed the patient's new clinical " results.  I personally viewed and interpreted the patient's EKG/Telemetry data    Assessment/Plan  Active Hospital Problems (** Indicates Principal Problem)    Diagnosis Date Noted   • Cardiac arrest with ventricular fibrillation [I46.9, I49.01] 03/12/2018      Resolved Hospital Problems    Diagnosis Date Noted Date Resolved   No resolved problems to display.       He has a severe cardiomyopathy he has A. fib with a little bit of a difficult rate control him and add digoxin to his regimen and increase his metoprolol to 75 twice a day I'm going to continue the anticoagulation with warfarin from my standpoint could be discharged today follow-up with Shazia Kuo in a week and see me in a month at the end of a month's time we should probably put him on amiodarone and trying get him cardioverted back to sinus     Valentin Moya MD  03/19/18  9:00 AM

## 2018-03-19 NOTE — DISCHARGE SUMMARY
PHYSICIAN DISCHARGE SUMMARY                                                                          [facility]    Patient Identification:  Name: Shannan Arguelles  Age: 52 y.o.  Sex: male  :  1965  MRN: [id number]  Primary Care Physician: JESUSITA Spaulding    Admit date: 3/11/2018  Discharge date and time: 3/19/2018  Discharged Condition: fair    Discharge Diagnoses:Active Problems:    Cardiac arrest with ventricular fibrillation       Hospital Course: Shannan Arguelles presented to [facility]  As below:  Patient was brought to the emergency room on 3/12/2018 actually by a private vehicle was found to have an initial cardiac rhythm is asystole with subsequent rhythms of V. fib patient was shocked and intubated.  Chest x-ray showed pulmonary edema patient was admitted to the intensive care unit.  Patient was found to have a urine drug screen positive for cocaine.  Patient also experienced acute kidney injury and severe lactic acidosis.  Patient did have some evidence of GI bleed.  Due to his illness a colonoscopy was deferred as an inpatient however he will need outpatient evaluation.  His hemoglobin had stabilized and he had no further incidences of melena or hematochezia.  Patient was seen by our substance abuse program and was counseled regarding the effects of his cocaine abuse.  Patient subsequent had an AICD placed.  Also anticoagulated for atrial fibrillation.  Patient will follow up with [facility] Dr. Espitia for his anticoagulation.  Date of discharge I'm setting up an appointment for him to get his INR checked at their office later this week and he will see them in the office next week for office visit.  He'll be sent home on a Lovenox bridge to his Coumadin being therapeutic and this was managed by [facility].    Consults:   IP CONSULT TO NEPHROLOGY  IP CONSULT TO  GASTROENTEROLOGY  IP CONSULT TO CARDIAC ELECTROPHYSIOLOGIST  IP CONSULT TO ACCESS CENTER  CARDIAC REHAB EVALUATION AND ENROLLMENT  IP CONSULT TO ENDOCRINOLOGY    Significant Diagnostic Studies:    Results from last 7 days  Lab Units 03/20/18  0306 03/17/18  0309 03/16/18  0235 03/15/18  0845 03/15/18  0844 03/15/18  0511 03/14/18  0451   WBC 10*3/mm3 7.93 10.58 11.50*  --   --  12.16* 13.70*   HEMOGLOBIN g/dL 13.3* 13.5* 12.9*  --   --  12.9* 12.4*   HEMOGLOBIN, POC g/dL  --   --   --  12.2 11.9*  --   --    PLATELETS 10*3/mm3 236 217 210  --   --  190 197     Results from last 7 days  Lab Units 03/20/18  1258 03/20/18  0306 03/19/18  0312 03/18/18  0358 03/17/18  1643 03/17/18  0309 03/16/18  0235 03/15/18  1436 03/15/18  0511 03/14/18  0451   SODIUM mmol/L  --  136 138 137 136 138  138 138 140 141 144   POTASSIUM mmol/L 3.9 3.2* 3.5 4.1 4.2 3.7  3.7 3.8 4.2 3.8 4.1   CHLORIDE mmol/L  --  97* 100 100 97* 100  100 100 99 100 107   CO2 mmol/L  --  25.4 25.4 22.6 21.5* 20.5*  20.5* 22.0 17.7* 20.1* 22.6   BUN mg/dL  --  12 14 16 18 17  17 21* 26* 27* 46*   CREATININE mg/dL  --  1.04 1.12 1.21 1.30* 1.12  1.12 1.32* 1.38* 1.28* 1.76*   GLUCOSE mg/dL  --  168* 243* 352* 429* 221*  221* 251* 335* 289* 252*   CALCIUM mg/dL  --  8.7 8.5* 8.3* 8.4* 8.4*  8.4* 8.9 8.5* 8.5* 8.0*   MAGNESIUM mg/dL  --  1.6  --   --   --  1.8  --   --  1.9 2.1   PHOSPHORUS mg/dL  --  3.7  --   --   --  3.2 3.6 3.4 2.8 2.9   Estimated Creatinine Clearance: 130.7 mL/min (by C-G formula based on SCr of 1.04 mg/dL).    Discharge Exam:  Temp:  [97.5 °F (36.4 °C)-98.3 °F (36.8 °C)] 98.3 °F (36.8 °C)  Heart Rate:  [79-98] 88  Resp:  [16-20] 18  BP: (105-158)/(66-82) 110/72  GENERAL:  NAD, Aox3  HEENT:  EOMI, nonicteric sclera, no JVD  PULMONARY:    CTAB, no wheeze, no crackles, rhonchi, symmetric air entry  CARDIAC:  RRR no MRG, S1 S2  ABD: SNTND BS+  EXT: no c/c/e, pulses symmetric 2+ bilaterally  NEURO:  CNs II-XII intact, no focal  deficits     Disposition:  Home       Your medication list      START taking these medications      Instructions Last Dose Given Next Dose Due   digoxin 250 MCG tablet  Commonly known as:  LANOXIN  Start taking on:  3/21/2018      Take 1 tablet by mouth Daily.       enoxaparin 80 MG/0.8ML solution syringe  Commonly known as:  LOVENOX      Inject 0.8 mL under the skin Every 12 (Twelve) Hours.       furosemide 40 MG tablet  Commonly known as:  LASIX  Start taking on:  3/21/2018      Take 1 tablet by mouth Daily.       Metoprolol Tartrate 75 MG tablet      Take 75 mg by mouth Every 12 (Twelve) Hours.       pantoprazole 40 MG EC tablet  Commonly known as:  PROTONIX      Take 1 tablet by mouth Daily.       potassium chloride 10 MEQ CR capsule  Commonly known as:  MICRO-K  Start taking on:  3/21/2018      Take 2 capsules by mouth Daily.       warfarin 5 MG tablet  Commonly known as:  COUMADIN      Take 1 tablet by mouth Daily.          CONTINUE taking these medications      Instructions Last Dose Given Next Dose Due   atorvastatin 40 MG tablet  Commonly known as:  LIPITOR      Take 40 mg by mouth Daily.       insulin glargine 100 UNIT/ML injection  Commonly known as:  LANTUS      Inject 29 Units under the skin Every Night.       lisinopril 10 MG tablet  Commonly known as:  PRINIVIL,ZESTRIL      Take 10 mg by mouth Daily.             Where to Get Your Medications      These medications were sent to [facility]/pharmacy #[id number] - [place], [place] - [address] [phone] [place] [phone]   [address] [place] [address]    Hours:  24-hours Phone:  [phone]    digoxin 250 MCG tablet   enoxaparin 80 MG/0.8ML solution syringe   furosemide 40 MG tablet   Metoprolol Tartrate 75 MG tablet   pantoprazole 40 MG EC tablet   potassium chloride 10 MEQ CR capsule   warfarin 5 MG tablet         Patient Instructions:   Follow-up Information     [facility] Follow up in 1 week(s).    Why:  Incision  check           JESUSITA Spaulding Follow up.    Specialty:  Family Medicine  Contact information:  [address]  [facility] [id number] [phone]             KHANG Torres. Call.    Specialty:  Cardiology  Why:  call to confirm appt  Contact information:  [address] [facility] [address]  [place] [id number] [phone]                    Medication Reconciliation: Please see electronically completed Med Rec.    Total time spent discharging patient including evaluation, medication reconciliation, arranging follow up, and post hospitalization instructions and education total time exceeds 30 minutes.    Signed:  Mark Anthony Mackenzie MD  3/19/2018  4:34 PM

## 2018-03-19 NOTE — PROGRESS NOTES
"   LOS: 7 days    Patient Care Team:  JESUSITA Lam as PCP - General (Family Medicine)    Chief Complaint:    Chief Complaint   Patient presents with   • Cardiac Arrest       Subjective  Follow UP SUHAIL    Interval History:   Doing well. Does c/o pleuritic cp with cough.  Denies shortness of air, no nausea or vomiting, Edema still present but slightly better.  no dysuria or gross hematuria.  Recurrent diarrhea    Review of Systems:   As noted above.     Objective     Vital Signs  Temp:  [97.5 °F (36.4 °C)-98.3 °F (36.8 °C)] 97.9 °F (36.6 °C)  Heart Rate:  [] 84  Resp:  [16-20] 20  BP: (103-158)/(66-82) 158/66    Flowsheet Rows    Flowsheet Row First Filed Value   Admission Height 200.7 cm (79\") Documented at 03/11/2018 2344   Admission Weight (!)  141 kg (310 lb) ['s license] Documented at 03/11/2018 2344          No intake/output data recorded.  I/O last 3 completed shifts:  In: 1265 [P.O.:1200; I.V.:15; IV Piggyback:50]  Out: 3630 [Urine:3630]    Intake/Output Summary (Last 24 hours) at 03/19/18 0703  Last data filed at 03/19/18 0700   Gross per 24 hour   Intake             1265 ml   Output             1200 ml   Net               65 ml       Physical Exam:  General Appearance: alert, oriented x 3, no acute distress,   Skin: warm and dry  HEENT: Nonicteric sclerae, oral mucosa is dry   Neck: supple, no JVD, trachea midline  Lungs: CTA, unlabored breathing effort  Heart: Irregularly irregular, no rub  Abdomen: soft, non-tender,  present bowel sounds to auscultation  : no palpable bladder,  Extremities: 2+ edema, no cyanosis or clubbing  Neuro: normal speech and mental status       Results Review:      Results from last 7 days  Lab Units 03/19/18  0312 03/18/18  0358 03/17/18  1643  03/16/18  0235  03/15/18  0511   SODIUM mmol/L 138 137 136  < > 138  < > 141   POTASSIUM mmol/L 3.5 4.1 4.2  < > 3.8  < > 3.8   CHLORIDE mmol/L 100 100 97*  < > 100  < > 100   CO2 mmol/L 25.4 22.6 21.5*  < > 22.0  < > " 20.1*   BUN mg/dL 14 16 18  < > 21*  < > 27*   CREATININE mg/dL 1.12 1.21 1.30*  < > 1.32*  < > 1.28*   CALCIUM mg/dL 8.5* 8.3* 8.4*  < > 8.9  < > 8.5*   BILIRUBIN mg/dL  --   --  0.7  --  0.9  --  1.3*   ALK PHOS U/L  --   --  79  --  73  --  66   ALT (SGPT) U/L  --   --  51*  --  31  --  39   AST (SGOT) U/L  --   --  55*  --  12  --  14   GLUCOSE mg/dL 243* 352* 429*  < > 251*  < > 289*   < > = values in this interval not displayed.    Estimated Creatinine Clearance: 123.6 mL/min (by C-G formula based on SCr of 1.12 mg/dL).      Results from last 7 days  Lab Units 03/17/18  0309 03/16/18  0235 03/15/18  1436 03/15/18  0511 03/14/18  0451   MAGNESIUM mg/dL 1.8  --   --  1.9 2.1   PHOSPHORUS mg/dL 3.2 3.6 3.4 2.8 2.9         Results from last 7 days  Lab Units 03/17/18  0309 03/13/18  0409   URIC ACID mg/dL 5.0 6.0         Results from last 7 days  Lab Units 03/17/18  0309 03/16/18  0235 03/15/18  0845 03/15/18  0844 03/15/18  0511 03/14/18  0451 03/13/18  0409   WBC 10*3/mm3 10.58 11.50*  --   --  12.16* 13.70* 16.17*   HEMOGLOBIN g/dL 13.5* 12.9*  --   --  12.9* 12.4* 14.3   HEMOGLOBIN, POC g/dL  --   --  12.2 11.9*  --   --   --    PLATELETS 10*3/mm3 217 210  --   --  190 197 240         Results from last 7 days  Lab Units 03/19/18  0312 03/18/18  0358 03/17/18  0309   INR  1.40* 1.14* 1.06         Imaging Results (last 24 hours)     Procedure Component Value Units Date/Time    XR Chest 1 View [[id number]] Collected:  03/18/18 1026     Updated:  03/18/18 1031    Narrative:       XR CHEST 1 VW-     HISTORY: Male who is 52 years-old,  right-sided pain     TECHNIQUE: Frontal view of the chest     COMPARISON: 3/12/2018     FINDINGS: Previous line and tubes have been removed. Left-sided  generator device and cardiac lead are seen. Heart, mediastinum and  pulmonary vasculature are unremarkable. Atelectasis/infiltrate in the  mid and lower lungs, right more than left. No pleural effusion, or  pneumothorax. No acute  osseous process.       Impression:       Atelectasis or infiltrate in the right mid and lower lungs.  Continued follow-up suggested.     This report was finalized on 3/18/2018 10:28 AM by Dr. Pato Terry MD.             digoxin 125 mcg Oral Daily   enoxaparin 0.5 mg/kg Subcutaneous Q12H   insulin aspart 0-14 Units Subcutaneous 4x Daily AC & at Bedtime   insulin detemir 35 Units Subcutaneous Nightly   lisinopril 2.5 mg Oral Q24H   metoprolol tartrate 50 mg Oral Q12H   pantoprazole 40 mg Oral Q AM   piperacillin-tazobactam 3.375 g Intravenous Q8H   warfarin 5 mg Oral Daily       sodium chloride 9 mL/hr Last Rate: 9 mL/hr (03/14/18 0830)       Medication Review:   Current Facility-Administered Medications   Medication Dose Route Frequency Provider Last Rate Last Dose   • acetaminophen (TYLENOL) tablet 650 mg  650 mg Oral Q6H PRN Linda Blank MD   650 mg at 03/14/18 1135   • dextrose (D50W) solution 25 g  25 g Intravenous Q15 Min PRN Linda Blank MD       • dextrose (GLUTOSE) oral gel 15 g  15 g Oral Q15 Min PRN Linda Blank MD       • digoxin (LANOXIN) tablet 125 mcg  125 mcg Oral Daily JESUSITA Pimentel       • enoxaparin (LOVENOX) syringe 70 mg  0.5 mg/kg Subcutaneous Q12H JESUSITA Miller   70 mg at 03/18/18 2104   • glucagon (human recombinant) (GLUCAGEN DIAGNOSTIC) injection 1 mg  1 mg Subcutaneous PRN Linda Blank MD       • HYDROcodone-acetaminophen (NORCO) 5-325 MG per tablet 1 tablet  1 tablet Oral Q4H PRN Valentin Moya MD   1 tablet at 03/18/18 0945   • HYDROmorphone (DILAUDID) injection 0.5 mg  0.5 mg Intravenous Q2H PRN JESUSITA Miller        And   • naloxone (NARCAN) injection 0.4 mg  0.4 mg Intravenous Q5 Min PRN JESUSITA Miller       • insulin aspart (novoLOG) injection 0-14 Units  0-14 Units Subcutaneous 4x Daily AC & at Bedtime Linda Blank MD   5 Units at 03/19/18 0640   • insulin detemir (LEVEMIR) injection 35 Units  35 Units Subcutaneous Nightly Mark Anthony Mackenzie,  MD   35 Units at 03/18/18 2105   • lisinopril (PRINIVIL,ZESTRIL) tablet 2.5 mg  2.5 mg Oral Q24H Valentin Moya MD   2.5 mg at 03/18/18 0945   • loperamide (IMODIUM) capsule 2 mg  2 mg Oral 4x Daily PRN Linda Blank MD   2 mg at 03/14/18 1021   • metoprolol tartrate (LOPRESSOR) tablet 50 mg  50 mg Oral Q12H JESUSITA Pimentel   50 mg at 03/18/18 2103   • morphine injection 1 mg  1 mg Intravenous Q4H PRN Valentin Moya MD        And   • naloxone (NARCAN) injection 0.4 mg  0.4 mg Intravenous Q5 Min PRN Valentin Moya MD       • ondansetron (ZOFRAN) tablet 4 mg  4 mg Oral Q6H PRN Valentin Moya MD        Or   • ondansetron ODT (ZOFRAN-ODT) disintegrating tablet 4 mg  4 mg Oral Q6H PRN Valentin Moya MD        Or   • ondansetron (ZOFRAN) injection 4 mg  4 mg Intravenous Q6H PRN Valentin Moya MD       • ondansetron (ZOFRAN) injection 4 mg  4 mg Intravenous Q6H PRN JESUSITA Miller       • pantoprazole (PROTONIX) EC tablet 40 mg  40 mg Oral Q AM Linda Blank MD   40 mg at 03/19/18 0640   • piperacillin-tazobactam (ZOSYN) 3.375 g in iso-osmotic dextrose 50 ml (premix)  3.375 g Intravenous Q8H Linda Blank MD 12.5 mL/hr at 03/19/18 0124 3.375 g at 03/19/18 0124   • potassium chloride (MICRO-K) CR capsule 40 mEq  40 mEq Oral PRN Antony Mackenzie MD   40 mEq at 03/19/18 0640    Or   • potassium chloride (KLOR-CON) packet 40 mEq  40 mEq Oral PRN Antony Mackenzie MD        Or   • potassium chloride 10 mEq in 100 mL IVPB  10 mEq Intravenous Q1H PRN Antony Mackenzie MD       • sodium chloride 0.9 % flush 1-10 mL  1-10 mL Intravenous PRN JESUSITA Miller       • sodium chloride 0.9 % flush 10 mL  10 mL Intravenous PRN Jimmy Rodrigez MD       • sodium chloride 0.9 % infusion  9 mL/hr Intravenous Continuous Linda Blank MD 9 mL/hr at 03/14/18 0830 9 mL/hr at 03/14/18 0830   • warfarin (COUMADIN) tablet 5 mg  5 mg Oral Daily JESUSITA Pimentel           Assessment/Plan   1. SUHAIL  associated with  cardiac arrest.   No baseline renal function available at this time. Creatinine continues to trend down. Creatinine 1.12.  2.  SP Vfib arrest , resuscitated. Severe non-ischemic cardiomyopathy ,s/p AICD 3/16/18  3.  Polysubstance abuse, cocaine  4.  Diabetes mellitus type II    5.  Atrial fibrillation with controlled rate   6. Metabolic acidosis.  Resolved  7. Diarrhea being evaluated   8.  Mild hypokalemia, potassium 3.5.      Plan:  1.  Replete potassium.  2.  Oral diuretics, furosemide 40 mg daily  3.  Surveillance labs         Trent Arias MD  03/19/18  7:03 AM

## 2018-03-19 NOTE — CONSULTS
REFERRING PHYSICIAN: Mark Anthony Mackenzie MD    REASON FOR CONSULTATION: Treatment of diabetes mellitus.    HISTORY OF PRESENT ILLNESS: The patient is a 52-year-old male with diabetes mellitus. He was brought to the emergency room on 03/11/2018 and went into cardiac arrest and ventricular fibrillation. He was found to have nonischemic cardiomyopathy with ejection fraction of 14%. He has 10% stenosis of the circumflex and 20% to 30% stenosis of right coronary artery. He had implantable defibrillator placement during this admission.    Patient has known diabetes mellitus since 2004 and started on insulin in 2005. He has been on Lantus 29 units every evening and Humalog 1 unit/10 g of carbohydrate. He last saw Dr. Montesinos in May 2017. Fasting blood sugar runs between 170-190 and random blood sugar was less than 140. Blood sugar during this admission has been in the 200's. Hemoglobin A1c on admission is elevated at 8.20%.    His last eye examination was 2 years ago. He denies any retinopathy. He denies any associated kidney disease. He denies any numbness, tingling, or burning sensation in his hands and feet.    PAST MEDICAL HISTORY:  1.  Hyperlipidemia. Has been on Lipitor 40 mg once a day. Lipitor has been held during this admission because of elevated transaminases most likely related to the hepatic hypoperfusion.  2.  History of hypertension and has been on lisinopril 10 mg once a day.    PAST SURGICAL HISTORY: He has no previous surgeries until this admission.    FAMILY HISTORY: His maternal aunt and maternal grandmother have diabetes mellitus. His father had atrial fibrillation.    ALLERGIES:  PENICILLIN CAUSES A RASH.    SOCIAL HISTORY: He is single. He denies tobacco use. He has been snorting cocaine. He uses alcohol intermittently.    REVIEW OF SYSTEMS: He denies any significant weight change over the past 6 months. He denies fever, chills. He denies cough or cold symptoms.  He denies urinary or bowel symptoms.  He denies loss of consciousness or seizures. He denies melena, hematochezia, hematemesis, or hematuria.    PHYSICAL EXAMINATION   VITAL SIGNS:  Blood pressure is 119/82, respiratory rate 16, heart rate 98, temperature 97.7 degrees Fahrenheit.   GENERAL: Patient is awake, alert, oriented. Not dyspneic. Not tachypneic. Not orthopneic. Not in acute distress.  HEENT:  Pink conjunctivae. Sclerae are anicteric.  No xanthelasma. Full extraocular movement. No facial asymmetry. Speech is fluent. No carotid bruits. The thyroid is not enlarged. There is no cervical lymphadenopathy.  LUNGS: Equal chest expansion. No rales, no wheezes.    SKIN:  There is a recent surgical wound in the left upper chest which is healing well without erythema or discharge.   HEART:  Regular heart rate and rhythm. No gallop. No rales, no wheezes.  ABDOMEN: Soft, nontender. Bowel sounds are active.  EXTREMITIES: Has +1 to +2 edema. No cyanosis or clubbing. No calf tenderness. No xanthomas.    IMPRESSION:  1.  Diabetes mellitus, poorly controlled.  2.  Status post cardiopulmonary arrest due to ventricular fibrillation.  3.  Status post automated implantable cardioverter-defibrillator placement.  4.  Nonischemic cardiomyopathy.  5.  Nonobstructive coronary artery disease.  6.  Hyperlipidemia.  7.  Hypertension.  8.  Cocaine and opiate abuse.    RECOMMENDATIONS:  I will give patient an extra dose of Levemir 10 units this morning. Increase Levemir to 40 units every evening. Start NovoLog 8 units 3 times a day with meals. Check urine microalbumin. Patient has been advised to get a followup with Dr. Montesinos for further titration of his insulin dose.  Check lipid profile and restart Lipitor once ALT and AST have normalized. I will defer blood pressure control to the cardiologist.    Thank you for the referral. I will follow the patient with you during his hospital stay.

## 2018-03-19 NOTE — PLAN OF CARE
Problem: Fall Risk (Adult)  Goal: Identify Related Risk Factors and Signs and Symptoms  Outcome: Ongoing (interventions implemented as appropriate)      Problem: Skin Injury Risk (Adult)  Goal: Identify Related Risk Factors and Signs and Symptoms  Outcome: Ongoing (interventions implemented as appropriate)    Goal: Skin Health and Integrity  Outcome: Ongoing (interventions implemented as appropriate)   03/19/18 1426   Skin Injury Risk (Adult)   Skin Health and Integrity making progress toward outcome       Problem: Patient Care Overview  Goal: Plan of Care Review  Outcome: Ongoing (interventions implemented as appropriate)    Goal: Individualization and Mutuality  Outcome: Ongoing (interventions implemented as appropriate)    Goal: Discharge Needs Assessment  Outcome: Ongoing (interventions implemented as appropriate)    Goal: Interprofessional Rounds/Family Conf  Outcome: Ongoing (interventions implemented as appropriate)      Problem: Pain, Acute (Adult)  Goal: Identify Related Risk Factors and Signs and Symptoms  Outcome: Ongoing (interventions implemented as appropriate)    Goal: Acceptable Pain Control/Comfort Level  Outcome: Ongoing (interventions implemented as appropriate)   03/19/18 1426   Pain, Acute (Adult)   Acceptable Pain Control/Comfort Level making progress toward outcome

## 2018-03-19 NOTE — PROGRESS NOTES
Adult Nutrition  Assessment/PES    Patient Name:  Shannan Arguelles  YOB: 1965  MRN: [id number]  Admit Date:  3/11/2018    Assessment Date:  3/19/2018          Adult Nutrition Assessment     Row Name 03/19/18 1500       Reason for Assessment    Reason For Assessment follow-up protocol       Nutrition/Diet History    Typical Food/Fluid Intake Appetite/intake decreased today. Had eaten % over weekend.        Admit Weight    Admit Weight --   current wt 307 lb       Labs/Procedures/Meds    Lab Results Reviewed reviewed    Lab Results Comments Glu in 200s - endocrine adj insulin today       Medications    Pertinent Medications Reviewed reviewed    Pertinent Medications Comments abx, anticoag       Physical Findings    Skin other (see comments)   L upper chest       Nutrition Prescription PO    Common Modifiers Consistent Carbohydrate       PO Evaluation    % PO Intake refused lunch today; had eaten % over weekend          Problem/Interventions:          Intervention Goal     Row Name 03/19/18 1527       Intervention Goal    General Maintain nutrition;Disease management/therapy    PO PO intake (%)    PO Intake % 80 %    Weight Appropriate weight loss                Education/Evaluation     Row Name 03/19/18 1528       Education    Education Will Instruct as appropriate       Monitor/Evaluation    Monitor Per protocol        Electronically signed by:  Kala Aceves RD  03/19/18 3:28 PM

## 2018-03-20 VITALS
BODY MASS INDEX: 34.36 KG/M2 | RESPIRATION RATE: 20 BRPM | SYSTOLIC BLOOD PRESSURE: 107 MMHG | HEART RATE: 95 BPM | HEIGHT: 78 IN | WEIGHT: 297 LBS | OXYGEN SATURATION: 94 % | TEMPERATURE: 97.4 F | DIASTOLIC BLOOD PRESSURE: 77 MMHG

## 2018-03-20 PROBLEM — F19.10 POLYSUBSTANCE ABUSE: Status: ACTIVE | Noted: 2018-03-20

## 2018-03-20 PROBLEM — E78.5 HYPERLIPIDEMIA: Status: ACTIVE | Noted: 2018-03-20

## 2018-03-20 PROBLEM — E11.65 POORLY CONTROLLED DIABETES MELLITUS (HCC): Status: ACTIVE | Noted: 2018-03-20

## 2018-03-20 PROBLEM — I48.91 ATRIAL FIBRILLATION (HCC): Status: ACTIVE | Noted: 2018-03-20

## 2018-03-20 PROBLEM — I10 ESSENTIAL HYPERTENSION: Status: ACTIVE | Noted: 2018-03-20

## 2018-03-20 PROBLEM — I48.91 ATRIAL FIBRILLATION, UNSPECIFIED TYPE (HCC): Status: ACTIVE | Noted: 2018-03-20

## 2018-03-20 LAB
ALBUMIN SERPL-MCNC: 3.7 G/DL (ref 3.5–5.2)
ALBUMIN/GLOB SERPL: 1.6 G/DL
ALP SERPL-CCNC: 72 U/L (ref 39–117)
ALT SERPL W P-5'-P-CCNC: 102 U/L (ref 1–41)
ANION GAP SERPL CALCULATED.3IONS-SCNC: 13.6 MMOL/L
AST SERPL-CCNC: 88 U/L (ref 1–40)
BASOPHILS # BLD AUTO: 0.05 10*3/MM3 (ref 0–0.2)
BASOPHILS NFR BLD AUTO: 0.6 % (ref 0–1.5)
BILIRUB SERPL-MCNC: 0.5 MG/DL (ref 0.1–1.2)
BUN BLD-MCNC: 12 MG/DL (ref 6–20)
BUN/CREAT SERPL: 11.5 (ref 7–25)
CALCIUM SPEC-SCNC: 8.7 MG/DL (ref 8.6–10.5)
CHLORIDE SERPL-SCNC: 97 MMOL/L (ref 98–107)
CO2 SERPL-SCNC: 25.4 MMOL/L (ref 22–29)
CREAT BLD-MCNC: 1.04 MG/DL (ref 0.76–1.27)
DEPRECATED RDW RBC AUTO: 45.7 FL (ref 37–54)
EOSINOPHIL # BLD AUTO: 0.42 10*3/MM3 (ref 0–0.7)
EOSINOPHIL NFR BLD AUTO: 5.3 % (ref 0.3–6.2)
ERYTHROCYTE [DISTWIDTH] IN BLOOD BY AUTOMATED COUNT: 12.6 % (ref 11.5–14.5)
GFR SERPL CREATININE-BSD FRML MDRD: 75 ML/MIN/1.73
GLOBULIN UR ELPH-MCNC: 2.3 GM/DL
GLUCOSE BLD-MCNC: 168 MG/DL (ref 65–99)
GLUCOSE BLDC GLUCOMTR-MCNC: 202 MG/DL (ref 70–130)
GLUCOSE BLDC GLUCOMTR-MCNC: 360 MG/DL (ref 70–130)
HCT VFR BLD AUTO: 39.4 % (ref 40.4–52.2)
HGB BLD-MCNC: 13.3 G/DL (ref 13.7–17.6)
IMM GRANULOCYTES # BLD: 0.18 10*3/MM3 (ref 0–0.03)
IMM GRANULOCYTES NFR BLD: 2.3 % (ref 0–0.5)
INR PPP: 1.39 (ref 0.9–1.1)
LYMPHOCYTES # BLD AUTO: 2 10*3/MM3 (ref 0.9–4.8)
LYMPHOCYTES NFR BLD AUTO: 25.2 % (ref 19.6–45.3)
MAGNESIUM SERPL-MCNC: 1.6 MG/DL (ref 1.6–2.6)
MCH RBC QN AUTO: 33.3 PG (ref 27–32.7)
MCHC RBC AUTO-ENTMCNC: 33.8 G/DL (ref 32.6–36.4)
MCV RBC AUTO: 98.7 FL (ref 79.8–96.2)
MONOCYTES # BLD AUTO: 0.86 10*3/MM3 (ref 0.2–1.2)
MONOCYTES NFR BLD AUTO: 10.8 % (ref 5–12)
NEUTROPHILS # BLD AUTO: 4.42 10*3/MM3 (ref 1.9–8.1)
NEUTROPHILS NFR BLD AUTO: 55.8 % (ref 42.7–76)
PHOSPHATE SERPL-MCNC: 3.7 MG/DL (ref 2.5–4.5)
PLATELET # BLD AUTO: 236 10*3/MM3 (ref 140–500)
PMV BLD AUTO: 10 FL (ref 6–12)
POTASSIUM BLD-SCNC: 3.2 MMOL/L (ref 3.5–5.2)
POTASSIUM BLD-SCNC: 3.9 MMOL/L (ref 3.5–5.2)
PROT SERPL-MCNC: 6 G/DL (ref 6–8.5)
PROTHROMBIN TIME: 16.8 SECONDS (ref 11.7–14.2)
RBC # BLD AUTO: 3.99 10*6/MM3 (ref 4.6–6)
SODIUM BLD-SCNC: 136 MMOL/L (ref 136–145)
URATE SERPL-MCNC: 4.9 MG/DL (ref 3.4–7)
WBC NRBC COR # BLD: 7.93 10*3/MM3 (ref 4.5–10.7)

## 2018-03-20 PROCEDURE — 83735 ASSAY OF MAGNESIUM: CPT | Performed by: INTERNAL MEDICINE

## 2018-03-20 PROCEDURE — 85610 PROTHROMBIN TIME: CPT | Performed by: NURSE PRACTITIONER

## 2018-03-20 PROCEDURE — 63710000001 INSULIN DETEMER PER 5 UNITS: Performed by: INTERNAL MEDICINE

## 2018-03-20 PROCEDURE — 84681 ASSAY OF C-PEPTIDE: CPT | Performed by: INTERNAL MEDICINE

## 2018-03-20 PROCEDURE — 63710000001 INSULIN ASPART PER 5 UNITS: Performed by: INTERNAL MEDICINE

## 2018-03-20 PROCEDURE — 25010000002 ENOXAPARIN PER 10 MG: Performed by: NURSE PRACTITIONER

## 2018-03-20 PROCEDURE — 82962 GLUCOSE BLOOD TEST: CPT

## 2018-03-20 PROCEDURE — 92526 ORAL FUNCTION THERAPY: CPT | Performed by: SPEECH-LANGUAGE PATHOLOGIST

## 2018-03-20 PROCEDURE — 84132 ASSAY OF SERUM POTASSIUM: CPT | Performed by: INTERNAL MEDICINE

## 2018-03-20 PROCEDURE — 80053 COMPREHEN METABOLIC PANEL: CPT | Performed by: INTERNAL MEDICINE

## 2018-03-20 PROCEDURE — 86341 ISLET CELL ANTIBODY: CPT | Performed by: INTERNAL MEDICINE

## 2018-03-20 PROCEDURE — 85025 COMPLETE CBC W/AUTO DIFF WBC: CPT | Performed by: INTERNAL MEDICINE

## 2018-03-20 PROCEDURE — 99232 SBSQ HOSP IP/OBS MODERATE 35: CPT | Performed by: INTERNAL MEDICINE

## 2018-03-20 PROCEDURE — 84100 ASSAY OF PHOSPHORUS: CPT | Performed by: INTERNAL MEDICINE

## 2018-03-20 PROCEDURE — 84550 ASSAY OF BLOOD/URIC ACID: CPT | Performed by: INTERNAL MEDICINE

## 2018-03-20 RX ORDER — POTASSIUM CHLORIDE 750 MG/1
40 CAPSULE, EXTENDED RELEASE ORAL ONCE
Status: DISCONTINUED | OUTPATIENT
Start: 2018-03-20 | End: 2018-03-20 | Stop reason: HOSPADM

## 2018-03-20 RX ORDER — DIGOXIN 250 MCG
250 TABLET ORAL
Qty: 30 TABLET | Refills: 0 | Status: SHIPPED | OUTPATIENT
Start: 2018-03-21 | End: 2018-04-18 | Stop reason: SDUPTHER

## 2018-03-20 RX ORDER — FUROSEMIDE 40 MG/1
40 TABLET ORAL DAILY
Qty: 30 TABLET | Refills: 0 | Status: SHIPPED | OUTPATIENT
Start: 2018-03-21 | End: 2018-06-18 | Stop reason: SDUPTHER

## 2018-03-20 RX ORDER — POTASSIUM CHLORIDE 750 MG/1
20 CAPSULE, EXTENDED RELEASE ORAL DAILY
Qty: 60 CAPSULE | Refills: 0 | Status: SHIPPED | OUTPATIENT
Start: 2018-03-21 | End: 2018-04-18 | Stop reason: SDUPTHER

## 2018-03-20 RX ORDER — PANTOPRAZOLE SODIUM 40 MG/1
40 TABLET, DELAYED RELEASE ORAL DAILY
Qty: 30 TABLET | Refills: 0 | Status: SHIPPED | OUTPATIENT
Start: 2018-03-20 | End: 2018-04-18 | Stop reason: SDUPTHER

## 2018-03-20 RX ORDER — POTASSIUM CHLORIDE 750 MG/1
20 CAPSULE, EXTENDED RELEASE ORAL DAILY
Status: DISCONTINUED | OUTPATIENT
Start: 2018-03-20 | End: 2018-03-20 | Stop reason: HOSPADM

## 2018-03-20 RX ORDER — WARFARIN SODIUM 5 MG/1
5 TABLET ORAL
Qty: 30 TABLET | Refills: 0 | Status: SHIPPED | OUTPATIENT
Start: 2018-03-20 | End: 2018-04-09 | Stop reason: SDUPTHER

## 2018-03-20 RX ORDER — METOPROLOL TARTRATE 75 MG/1
75 TABLET, FILM COATED ORAL EVERY 12 HOURS SCHEDULED
Qty: 60 TABLET | Refills: 0 | Status: SHIPPED | OUTPATIENT
Start: 2018-03-20 | End: 2018-03-29 | Stop reason: SDUPTHER

## 2018-03-20 RX ADMIN — INSULIN ASPART 5 UNITS: 100 INJECTION, SOLUTION INTRAVENOUS; SUBCUTANEOUS at 06:41

## 2018-03-20 RX ADMIN — FUROSEMIDE 40 MG: 40 TABLET ORAL at 09:28

## 2018-03-20 RX ADMIN — INSULIN DETEMIR 20 UNITS: 100 INJECTION, SOLUTION SUBCUTANEOUS at 09:36

## 2018-03-20 RX ADMIN — INSULIN ASPART 15 UNITS: 100 INJECTION, SOLUTION INTRAVENOUS; SUBCUTANEOUS at 11:16

## 2018-03-20 RX ADMIN — POTASSIUM CHLORIDE 40 MEQ: 750 CAPSULE, EXTENDED RELEASE ORAL at 09:28

## 2018-03-20 RX ADMIN — HYDROCODONE BITARTRATE AND ACETAMINOPHEN 1 TABLET: 5; 325 TABLET ORAL at 01:23

## 2018-03-20 RX ADMIN — LOPERAMIDE HYDROCHLORIDE 2 MG: 2 CAPSULE ORAL at 00:10

## 2018-03-20 RX ADMIN — METOPROLOL TARTRATE 75 MG: 25 TABLET ORAL at 09:28

## 2018-03-20 RX ADMIN — POTASSIUM CHLORIDE 40 MEQ: 750 CAPSULE, EXTENDED RELEASE ORAL at 06:21

## 2018-03-20 RX ADMIN — PANTOPRAZOLE SODIUM 40 MG: 40 TABLET, DELAYED RELEASE ORAL at 06:21

## 2018-03-20 RX ADMIN — ENOXAPARIN SODIUM 70 MG: 80 INJECTION SUBCUTANEOUS at 09:27

## 2018-03-20 RX ADMIN — LISINOPRIL 2.5 MG: 2.5 TABLET ORAL at 09:28

## 2018-03-20 RX ADMIN — DIGOXIN 250 MCG: 250 TABLET ORAL at 11:19

## 2018-03-20 RX ADMIN — INSULIN ASPART 12 UNITS: 100 INJECTION, SOLUTION INTRAVENOUS; SUBCUTANEOUS at 11:15

## 2018-03-20 NOTE — PROGRESS NOTES
"  ENDOCRINE    Subjective   AND PLANS  Shannan Arguelles is a 52 y.o. male.     No complaints.  No nausea or vomiting.  Appetite good.  MARYELLEN antibody and C-peptide pending.  Fasting glucose 202.  Random glucose 253-312.  Increase Levemir to 20 units every morning and 45 units every evening.  Increase NovoLog to 15 units with each meal plus sliding scale.    Urine microalbumin normal.  Continue lisinopril.    ALT still elevated at 102.  AST elevated at 88.  Continue holding Lipitor until transaminases normalize.    In atrial fibrillation with controlled ventricular response on digoxin, Lovenox, warfarin, Lasix and Lopressor per cardiologist.      Objective   /95 (BP Location: Right arm, Patient Position: Sitting)   Pulse 97   Temp 97.2 °F (36.2 °C) (Oral)   Resp 20   Ht 200.7 cm (79\")   Wt 135 kg (297 lb)   SpO2 94%   BMI 33.46 kg/m²   Physical Exam    Awake, alert, not in distress.  No rales or wheezes.  Irregularly irregular heart rate and rhythm.  No gallop.  Abdomen soft, nontender.  Extremities warm.  No cyanosis or clubbing.    Lab Results (last 24 hours)     Procedure Component Value Units Date/Time    POC Glucose Once [[id number]]  (Abnormal) Collected:  03/20/18 0601    Specimen:  Blood Updated:  03/20/18 0602     Glucose 202 (H) mg/dL     Narrative:       Meter: [id number] : [id number] [facility]    Comprehensive Metabolic Panel [[id number]]  (Abnormal) Collected:  03/20/18 0306    Specimen:  Blood Updated:  03/20/18 0421     Glucose 168 (H) mg/dL      BUN 12 mg/dL      Creatinine 1.04 mg/dL      Sodium 136 mmol/L      Potassium 3.2 (L) mmol/L      Chloride 97 (L) mmol/L      CO2 25.4 mmol/L      Calcium 8.7 mg/dL      Total Protein 6.0 g/dL      Albumin 3.70 g/dL      ALT (SGPT) 102 (H) U/L      AST (SGOT) 88 (H) U/L      Alkaline Phosphatase 72 U/L      Total Bilirubin 0.5 mg/dL      eGFR Non African Amer 75 mL/min/1.73      Globulin 2.3 gm/dL      A/G Ratio 1.6 g/dL      " BUN/Creatinine Ratio 11.5     Anion Gap 13.6 mmol/L     Uric Acid [[id number]]  (Normal) Collected:  03/20/18 0306    Specimen:  Blood Updated:  03/20/18 0419     Uric Acid 4.9 mg/dL     Magnesium [[id number]]  (Normal) Collected:  03/20/18 0306    Specimen:  Blood Updated:  03/20/18 0419     Magnesium 1.6 mg/dL     Phosphorus [[id number]]  (Normal) Collected:  03/20/18 0306    Specimen:  Blood Updated:  03/20/18 0419     Phosphorus 3.7 mg/dL     Protime-INR [[id number]]  (Abnormal) Collected:  03/20/18 0306    Specimen:  Blood Updated:  03/20/18 0411     Protime 16.8 (H) Seconds      INR 1.39 (H)    CBC & Differential [[id number]] Collected:  03/20/18 0306    Specimen:  Blood Updated:  03/20/18 0359    Narrative:       The following orders were created for panel order CBC & Differential.  Procedure                               Abnormality         Status                     ---------                               -----------         ------                     CBC Auto Differential[[id number]]        Abnormal            Final result                 Please view results for these tests on the individual orders.    CBC Auto Differential [[id number]]  (Abnormal) Collected:  03/20/18 0306    Specimen:  Blood Updated:  03/20/18 0359     WBC 7.93 10*3/mm3      RBC 3.99 (L) 10*6/mm3      Hemoglobin 13.3 (L) g/dL      Hematocrit 39.4 (L) %      MCV 98.7 (H) fL      MCH 33.3 (H) pg      MCHC 33.8 g/dL      RDW 12.6 %      RDW-SD 45.7 fl      MPV 10.0 fL      Platelets 236 10*3/mm3      Neutrophil % 55.8 %      Lymphocyte % 25.2 %      Monocyte % 10.8 %      Eosinophil % 5.3 %      Basophil % 0.6 %      Immature Grans % 2.3 (H) %      Neutrophils, Absolute 4.42 10*3/mm3      Lymphocytes, Absolute 2.00 10*3/mm3      Monocytes, Absolute 0.86 10*3/mm3      Eosinophils, Absolute 0.42 10*3/mm3      Basophils, Absolute 0.05 10*3/mm3      Immature Grans, Absolute 0.18 (H) 10*3/mm3     Glutamic Acid Decarboxylase [[id number]] Collected:   03/20/18 0306    Specimen:  Blood Updated:  03/20/18 0349    C-Peptide [[id number]] Collected:  03/20/18 0306    Specimen:  Blood Updated:  03/20/18 0349    POC Glucose Once [[id number]]  (Abnormal) Collected:  03/19/18 2012    Specimen:  Blood Updated:  03/19/18 2013     Glucose 253 (H) mg/dL     Narrative:       Meter: [id number] : [id number] [place] [facility]    POC Glucose Once [[id number]]  (Abnormal) Collected:  03/19/18 1547    Specimen:  Blood Updated:  03/19/18 1548     Glucose 312 (H) mg/dL     Narrative:       Meter: [id number] : [id number] Emma GAINES    Folate RBC [[id number]] Collected:  03/16/18 0235    Specimen:  Blood Updated:  03/19/18 1513     Folate, Hemolysate 461.2 ng/mL      Hematocrit 41.2 %      RBC Folate 1119 ng/mL     Narrative:       Performed at:  [address] [place] [address]  [id number]  : Marcus Dietrich PhD, Phone:  [phone]    POC Glucose Once [[id number]]  (Abnormal) Collected:  03/19/18 1314    Specimen:  Blood Updated:  03/19/18 1317     Glucose 254 (H) mg/dL     Narrative:       Meter: [id number] : [id number] Emma GAINES    Microalbumin / Creatinine Urine Ratio - [[id number]] Collected:  03/19/18 0936    Specimen:  Urine Updated:  03/19/18 1023     Microalbumin/Creatinine Ratio -- mg/g      Comment: Unable to calculate        Creatinine, Urine 92.8 mg/dL      Microalbumin, Urine <1.2 mg/L             Active Problems:    Cardiac arrest with ventricular fibrillation    Poorly controlled diabetes mellitus    Polysubstance abuse    Essential hypertension    Hyperlipidemia    Atrial fibrillation    Insulin adjusted as discussed above.  Continue holding Lipitor.  Will defer blood pressure control and treatment of atrial fibrillation to cardiologist.

## 2018-03-20 NOTE — PLAN OF CARE
Problem: Patient Care Overview  Goal: Plan of Care Review  Outcome: Ongoing (interventions implemented as appropriate)   03/20/18 1415   OTHER   Outcome Summary No s/s with reg diet. Pt acknowledges h/o GERD w/ inability to lay down immediately after eating/drinking. Reviewed s/s aspiration and rec for vfss if s/s noted.   Coping/Psychosocial   Plan of Care Reviewed With patient   Coping/Psychosocial   Patient Agreement with Plan of Care agrees

## 2018-03-20 NOTE — PROGRESS NOTES
"   LOS: 8 days    Patient Care Team:  JESUSITA Lam as PCP - General (Family Medicine)    Chief Complaint:    Chief Complaint   Patient presents with   • Cardiac Arrest       Subjective  Follow UP SUHAIL    Interval History:   Very tired. Wants to go home. Not sleeping. Eating. Bowels moving.     Review of Systems:   As noted above.     Objective     Vital Signs  Temp:  [97.2 °F (36.2 °C)-98.3 °F (36.8 °C)] 97.4 °F (36.3 °C)  Heart Rate:  [88-97] 95  Resp:  [18-20] 20  BP: ()/(68-95) 107/77    Flowsheet Rows    Flowsheet Row First Filed Value   Admission Height 200.7 cm (79\") Documented at 03/11/2018 2344   Admission Weight (!)  141 kg (310 lb) ['s license] Documented at 03/11/2018 2344          I/O this shift:  In: 240 [P.O.:240]  Out: -   I/O last 3 completed shifts:  In: 1675 [P.O.:1560; I.V.:15; IV Piggyback:100]  Out: 2605 [Urine:2605]    Intake/Output Summary (Last 24 hours) at 03/20/18 1214  Last data filed at 03/20/18 0819   Gross per 24 hour   Intake              840 ml   Output             1405 ml   Net             -565 ml       Physical Exam:  General Appearance: alert, no acute distress, sitting in chair.   Skin: warm and dry  HEENT: Nonicteric sclerae, oral mucosa is dry   Neck: supple, no JVD, trachea midline  Lungs: Clear to auscultation   Heart: Irregularly irregular, no rub  Abdomen: soft, non-tender,  present bowel sounds to auscultation  : no palpable bladder,  Extremities: 2+ edema, no cyanosis or clubbing  Neuro: normal speech and mental status       Results Review:      Results from last 7 days  Lab Units 03/20/18  0306 03/19/18  0312 03/18/18  0358 03/17/18  1643  03/16/18  0235   SODIUM mmol/L 136 138 137 136  < > 138   POTASSIUM mmol/L 3.2* 3.5 4.1 4.2  < > 3.8   CHLORIDE mmol/L 97* 100 100 97*  < > 100   CO2 mmol/L 25.4 25.4 22.6 21.5*  < > 22.0   BUN mg/dL 12 14 16 18  < > 21*   CREATININE mg/dL 1.04 1.12 1.21 1.30*  < > 1.32*   CALCIUM mg/dL 8.7 8.5* 8.3* 8.4*  < > 8.9 "   BILIRUBIN mg/dL 0.5  --   --  0.7  --  0.9   ALK PHOS U/L 72  --   --  79  --  73   ALT (SGPT) U/L 102*  --   --  51*  --  31   AST (SGOT) U/L 88*  --   --  55*  --  12   GLUCOSE mg/dL 168* 243* 352* 429*  < > 251*   < > = values in this interval not displayed.    Estimated Creatinine Clearance: 130.7 mL/min (by C-G formula based on SCr of 1.04 mg/dL).      Results from last 7 days  Lab Units 03/20/18  0306 03/17/18  0309 03/16/18  0235  03/15/18  0511   MAGNESIUM mg/dL 1.6 1.8  --   --  1.9   PHOSPHORUS mg/dL 3.7 3.2 3.6  < > 2.8   < > = values in this interval not displayed.      Results from last 7 days  Lab Units 03/20/18 0306 03/17/18  0309   URIC ACID mg/dL 4.9 5.0         Results from last 7 days  Lab Units 03/20/18  0306 03/17/18  0309 03/16/18  0235 03/15/18  0845 03/15/18  0844 03/15/18  0511 03/14/18  0451   WBC 10*3/mm3 7.93 10.58 11.50*  --   --  12.16* 13.70*   HEMOGLOBIN g/dL 13.3* 13.5* 12.9*  --   --  12.9* 12.4*   HEMOGLOBIN, POC g/dL  --   --   --  12.2 11.9*  --   --    PLATELETS 10*3/mm3 236 217 210  --   --  190 197         Results from last 7 days  Lab Units 03/20/18  0306 03/19/18  0312 03/18/18  0358 03/17/18  0309   INR  1.39* 1.40* 1.14* 1.06         Imaging Results (last 24 hours)     ** No results found for the last 24 hours. **          digoxin 250 mcg Oral Daily   enoxaparin 0.5 mg/kg Subcutaneous Q12H   furosemide 40 mg Oral Daily   insulin aspart 0-14 Units Subcutaneous 4x Daily AC & at Bedtime   insulin aspart 15 Units Subcutaneous TID With Meals   insulin detemir 20 Units Subcutaneous QAM   insulin detemir 45 Units Subcutaneous Nightly   lisinopril 2.5 mg Oral Q24H   metoprolol tartrate 75 mg Oral Q12H   pantoprazole 40 mg Oral Q AM   potassium chloride 40 mEq Oral Once   potassium chloride 20 mEq Oral Daily   potassium chloride 40 mEq Oral Once   warfarin 5 mg Oral Daily       sodium chloride 9 mL/hr Last Rate: 9 mL/hr (03/14/18 0830)       Medication Review:   Current  Facility-Administered Medications   Medication Dose Route Frequency Provider Last Rate Last Dose   • acetaminophen (TYLENOL) tablet 650 mg  650 mg Oral Q6H PRN Linda Blank MD   650 mg at 03/14/18 1135   • dextrose (D50W) solution 25 g  25 g Intravenous Q15 Min PRN Linda Blank MD       • dextrose (GLUTOSE) oral gel 15 g  15 g Oral Q15 Min PRN Linda Blank MD       • digoxin (LANOXIN) tablet 250 mcg  250 mcg Oral Daily Valentin Moya MD   250 mcg at 03/20/18 1119   • enoxaparin (LOVENOX) syringe 70 mg  0.5 mg/kg Subcutaneous Q12H JESUSITA Miller   70 mg at 03/20/18 0927   • furosemide (LASIX) tablet 40 mg  40 mg Oral Daily Trent Arias MD   40 mg at 03/20/18 0928   • glucagon (human recombinant) (GLUCAGEN DIAGNOSTIC) injection 1 mg  1 mg Subcutaneous PRN Linda Blank MD       • HYDROcodone-acetaminophen (NORCO) 5-325 MG per tablet 1 tablet  1 tablet Oral Q4H PRN Valentin Moya MD   1 tablet at 03/20/18 0123   • HYDROmorphone (DILAUDID) injection 0.5 mg  0.5 mg Intravenous Q2H PRN JESUSITA Miller        And   • naloxone (NARCAN) injection 0.4 mg  0.4 mg Intravenous Q5 Min PRN JESUSITA Miller       • insulin aspart (novoLOG) injection 0-14 Units  0-14 Units Subcutaneous 4x Daily AC & at Bedtime Linda Blank MD   12 Units at 03/20/18 1115   • insulin aspart (novoLOG) injection 15 Units  15 Units Subcutaneous TID With Meals Job Cheema MD   15 Units at 03/20/18 1116   • insulin detemir (LEVEMIR) injection 20 Units  20 Units Subcutaneous QAM Job Cheema MD   20 Units at 03/20/18 0936   • insulin detemir (LEVEMIR) injection 45 Units  45 Units Subcutaneous Nightly Job Cheema MD   45 Units at 03/19/18 2046   • lisinopril (PRINIVIL,ZESTRIL) tablet 2.5 mg  2.5 mg Oral Q24H Rachael Olivo MD   2.5 mg at 03/20/18 0928   • loperamide (IMODIUM) capsule 2 mg  2 mg Oral 4x Daily PRN Linda Blank MD   2 mg at 03/20/18 0010   • metoprolol tartrate (LOPRESSOR) tablet 75 mg  75  mg Oral Q12H Rachael Olivo MD   75 mg at 03/20/18 0928   • morphine injection 1 mg  1 mg Intravenous Q4H PRN Valentin Moya MD        And   • naloxone (NARCAN) injection 0.4 mg  0.4 mg Intravenous Q5 Min PRN Valentin Moya MD       • ondansetron (ZOFRAN) tablet 4 mg  4 mg Oral Q6H PRN Valentin Moya MD        Or   • ondansetron ODT (ZOFRAN-ODT) disintegrating tablet 4 mg  4 mg Oral Q6H PRN Valentin Moya MD        Or   • ondansetron (ZOFRAN) injection 4 mg  4 mg Intravenous Q6H PRN Valentin Moya MD       • ondansetron (ZOFRAN) injection 4 mg  4 mg Intravenous Q6H PRN JESUSITA Miller       • pantoprazole (PROTONIX) EC tablet 40 mg  40 mg Oral Q AM Linda Blank MD   40 mg at 03/20/18 0621   • potassium chloride (MICRO-K) CR capsule 40 mEq  40 mEq Oral PRN Antony Mackenzie MD   40 mEq at 03/20/18 0928    Or   • potassium chloride (KLOR-CON) packet 40 mEq  40 mEq Oral PRN Antony Mackenzie MD        Or   • potassium chloride 10 mEq in 100 mL IVPB  10 mEq Intravenous Q1H PRN Antony Mackenzie MD       • potassium chloride (KLOR-CON) packet 40 mEq  40 mEq Oral Once Trent Arias MD       • potassium chloride (MICRO-K) CR capsule 20 mEq  20 mEq Oral Daily Rachael Olivo MD       • potassium chloride (MICRO-K) CR capsule 40 mEq  40 mEq Oral Once Rachael Olivo MD       • sodium chloride 0.9 % flush 1-10 mL  1-10 mL Intravenous PRN JESUSITA Miller       • sodium chloride 0.9 % flush 10 mL  10 mL Intravenous PRN Jimmy Rodrigez MD       • sodium chloride 0.9 % infusion  9 mL/hr Intravenous Continuous Linda Blank MD 9 mL/hr at 03/14/18 0830 9 mL/hr at 03/14/18 0830   • warfarin (COUMADIN) tablet 5 mg  5 mg Oral Daily JESUSITA Pimentel   5 mg at 03/19/18 1742       Assessment/Plan   1. SUHAIL  associated with  cardiac arrest.  No baseline renal function available at this time. Creatinine continues to improve. BP too controlled.  DC ACE inhib if going to continue metoprolol.  Probably safer  to not be on lisinopril and potassium replacement.   2.  SP Vfib arrest , resuscitated. Severe non-ischemic cardiomyopathy ,s/p AICD 3/16/18  3.  Polysubstance abuse, cocaine  4.  Diabetes mellitus type II . Dr. Cheema adjusted insulin.    5.  Atrial fibrillation with controlled rate   6. Metabolic acidosis.  Resolved  7. Diarrhea being evaluated   8.  Mild hypokalemia, potassium 3.5.      Plan:  1. Replete potassium.   2. Dc Lisinopril.  3. Will need BMP at endocrinologist office next week if dc.    4. Does not need follow up with renal. Will sign off.  Please call with questions.           Rachael Olivo MD  03/20/18  12:14 PM

## 2018-03-20 NOTE — THERAPY DISCHARGE NOTE
Acute Care - Speech Language Pathology   Swallow Eval/Discharge [facility]     Patient Name: Shannan Arguelles  : 1965  MRN: [id number]  Today's Date: 3/20/2018               Admit Date: 3/11/2018    Visit Dx:    ICD-10-CM ICD-9-CM   1. Cardiac arrest with ventricular fibrillation I46.9 427.5    I49.01 427.41   2. Atrial fibrillation, unspecified type I48.91 427.31   3. Altered mental status, unspecified altered mental status type R41.82 780.97   4. Lactic acidosis E87.2 276.2   5. Substance abuse F19.10 305.90     Patient Active Problem List   Diagnosis   • Cardiac arrest with ventricular fibrillation   • Poorly controlled diabetes mellitus   • Polysubstance abuse   • Essential hypertension   • Hyperlipidemia   • Atrial fibrillation   • Atrial fibrillation, unspecified type     Past Medical History:   Diagnosis Date   • Diabetes mellitus    • Hypertension    • Pneumonia    • Substance abuse      Past Surgical History:   Procedure Laterality Date   • CARDIAC CATHETERIZATION N/A 3/15/2018    Procedure: Left Heart Cath and right heart cath;  Surgeon: Valentin Moya MD;  Location: [facility] INVASIVE LOCATION;  Service: Cardiovascular   • CARDIAC ELECTROPHYSIOLOGY PROCEDURE N/A 3/16/2018    Procedure: Device Implant-[place]    ICD;  Surgeon: Robert Jauregui MD;  Location: [facility] INVASIVE LOCATION;  Service: Cardiovascular           EDUCATION  The patient has been educated in the following areas:   Dysphagia (Swallowing Impairment)   Discussed s/s aspiration, need to sit upright during and 30-6- mins after meals, and to speak w/ MD re: vfss if s/s aspiration noted.    SLP Recommendation and Plan                                     Plan of Care Reviewed With: patient  Outcome Summary: No s/s with reg diet.  Pt acknowledges h/o GERD w/ inability to lay down immediately after eating/drinking.  Reviewed s/s aspiration and rec for vfss if s/s noted.          SLP GOALS     Row Name 18 1100             Oral  Nutrition/Hydration Goal 1 (SLP)    Oral Nutrition/Hydration Goal 1, SLP No s/s aspiration with regular diet  -        User Key  (r) = Recorded By, (t) = Taken By, (c) = Cosigned By    Initials Name Provider Type    SA Sally Gold MS CCC-SLP Speech and Language Pathologist               Time Calculation:         Time Calculation- SLP     Row Name 03/20/18 1417             Time Calculation- SLP    SLP Start Time 1100  -      SLP Stop Time 1130  -      SLP Time Calculation (min) 30 min  -      SLP Received On 03/20/18  -        User Key  (r) = Recorded By, (t) = Taken By, (c) = Cosigned By    Initials Name Provider Type    SA Sally Gold MS CCC-SLP Speech and Language Pathologist          Therapy Charges for Today     Code Description Service Date Service Provider Modifiers Qty    [id number]  ST TREATMENT SWALLOW 2 3/20/2018 Sally Gold MS CCC-SLP GN 1                    MS JANAK Freire  3/20/2018

## 2018-03-20 NOTE — PLAN OF CARE
Problem: Skin Injury Risk (Adult)  Goal: Identify Related Risk Factors and Signs and Symptoms  Outcome: Ongoing (interventions implemented as appropriate)    Goal: Skin Health and Integrity  Outcome: Ongoing (interventions implemented as appropriate)      Problem: Patient Care Overview  Goal: Plan of Care Review  Outcome: Ongoing (interventions implemented as appropriate)   03/20/18 0411   OTHER   Outcome Summary VSS throughout shift, complained of back pain, gave Norco per MAR, remains in A.Fib with occasional PVC's, possible DC tomorrow, will continue to monitor.      Goal: Individualization and Mutuality  Outcome: Ongoing (interventions implemented as appropriate)    Goal: Interprofessional Rounds/Family Conf  Outcome: Ongoing (interventions implemented as appropriate)      Problem: Pain, Acute (Adult)  Goal: Identify Related Risk Factors and Signs and Symptoms  Outcome: Ongoing (interventions implemented as appropriate)    Goal: Acceptable Pain Control/Comfort Level  Outcome: Ongoing (interventions implemented as appropriate)

## 2018-03-21 LAB — C PEPTIDE SERPL-MCNC: <0.1 NG/ML (ref 1.1–4.4)

## 2018-03-21 NOTE — PROGRESS NOTES
Case Management Discharge Note    Final Note: Pt d/c home 3/20/18 and denied d/c needs.      Destination     No service coordination in this encounter.      Durable Medical Equipment     No service coordination in this encounter.      Dialysis/Infusion     No service coordination in this encounter.      Home Medical Care     No service coordination in this encounter.      Social Care     No service coordination in this encounter.        Other:  (private auto)    Final Discharge Disposition Code: 01 - home or self-care

## 2018-03-22 ENCOUNTER — TELEPHONE (OUTPATIENT)
Dept: CARDIOLOGY | Facility: CLINIC | Age: 53
End: 2018-03-22

## 2018-03-22 NOTE — TELEPHONE ENCOUNTER
Patient calling wanting to know if he can take zyrtec and cyclobenzaprine 10mg once daily    Patient was in the hosp for cardia arrest    [phone]

## 2018-03-23 ENCOUNTER — CONVERSION ENCOUNTER (OUTPATIENT)
Dept: CARDIOLOGY | Facility: CLINIC | Age: 53
End: 2018-03-23

## 2018-03-23 ENCOUNTER — TELEPHONE (OUTPATIENT)
Dept: CARDIOLOGY | Facility: HOSPITAL | Age: 53
End: 2018-03-23

## 2018-03-23 LAB — GAD65 AB SER-ACNC: 392.1 U/ML (ref 0–5)

## 2018-03-23 NOTE — TELEPHONE ENCOUNTER
So he needs to be therapeutic for 4 weeks in a row so we can do DFT's so he will need weekly or more frequently if needed to be sure he remains therapeutic. You can keep me up to date on his numbers if possible.

## 2018-03-26 ENCOUNTER — TELEPHONE (OUTPATIENT)
Dept: CARDIOLOGY | Facility: CLINIC | Age: 53
End: 2018-03-26

## 2018-03-26 NOTE — TELEPHONE ENCOUNTER
He has not been here to have an INR check-need to schedule DFT's once therapeutic for 4 weeks. Per the DC note was suppose to have INR today. He has appt with Shazia 3/28- called, no answer, left message to call us.

## 2018-03-27 NOTE — TELEPHONE ENCOUNTER
INR 1.3 today with his PCP office-JEFF Sarabia in [place].     He took last dose of lovenox this am, reordered to [facility] per pt request -80 mg q12h  Instructed to continue.     He was taking 5mg warfarin daily    Increased to 10mg today, tomorrow and Thursay-INR check on Friday at JESUSITA Burns before noon so we can get results that day    Need therapeutic for 3 weeks for DFT's.     For [facility] info: JESUSITA Burns [phone] office number    Tamy will you call [place] office with [facility] fax number   [phone]

## 2018-03-27 NOTE — TELEPHONE ENCOUNTER
Pt called back this morning. He says he had his INR checked Friday at Dr. Sarabia office and told them to send to us. He says it was 1.3. He just continued his normal dosage since he did not hear anything from us.........Tamy

## 2018-03-28 ENCOUNTER — OFFICE VISIT (OUTPATIENT)
Dept: CARDIOLOGY | Facility: CLINIC | Age: 53
End: 2018-03-28

## 2018-03-28 ENCOUNTER — CLINICAL SUPPORT NO REQUIREMENTS (OUTPATIENT)
Dept: CARDIOLOGY | Facility: CLINIC | Age: 53
End: 2018-03-28

## 2018-03-28 VITALS
OXYGEN SATURATION: 99 % | HEIGHT: 78 IN | WEIGHT: 291 LBS | SYSTOLIC BLOOD PRESSURE: 124 MMHG | HEART RATE: 84 BPM | BODY MASS INDEX: 33.67 KG/M2 | DIASTOLIC BLOOD PRESSURE: 64 MMHG

## 2018-03-28 DIAGNOSIS — I49.01 CARDIAC ARREST WITH VENTRICULAR FIBRILLATION (HCC): ICD-10-CM

## 2018-03-28 DIAGNOSIS — I46.9 CARDIAC ARREST WITH VENTRICULAR FIBRILLATION (HCC): ICD-10-CM

## 2018-03-28 DIAGNOSIS — I48.0 PAROXYSMAL ATRIAL FIBRILLATION (HCC): Primary | ICD-10-CM

## 2018-03-28 DIAGNOSIS — I49.01 VENTRICULAR FIBRILLATION (HCC): Primary | ICD-10-CM

## 2018-03-28 DIAGNOSIS — I42.9 CARDIOMYOPATHY, UNSPECIFIED TYPE (HCC): ICD-10-CM

## 2018-03-28 PROCEDURE — 99214 OFFICE O/P EST MOD 30 MIN: CPT | Performed by: PHYSICIAN ASSISTANT

## 2018-03-28 PROCEDURE — 93000 ELECTROCARDIOGRAM COMPLETE: CPT | Performed by: PHYSICIAN ASSISTANT

## 2018-03-28 PROCEDURE — 93282 PRGRMG EVAL IMPLANTABLE DFB: CPT | Performed by: INTERNAL MEDICINE

## 2018-03-28 NOTE — PROGRESS NOTES
Date of Office Visit: 2018  Encounter Provider: KHANG Torres  Place of Service: [facility] CARDIOLOGY  Patient Name: Shannan Arguelles  :1965    Chief Complaint   Patient presents with   • Cardiomyopathy     1 week hospiral follow up   :     HPI: Shannan Arguelles is a 52 y.o. male  , New t m,who presents today for  Follow-p.  Old records have been obtained and reviewed by me.  He is a patient with no prior cardiac history who was brought to the emergency room on 3/11/2018 by a friend and subsequently had a V. fib arrest.  He was defibrillated 3 times and intubated.  Initially there was no evidence of ST elevation or depression on his ECG, and he did come back with multiple substances in his urine including cocaine, opioids, and THC.  He was also found to be in atrial fibrillation with RVR.  As part of his cardiac workup he was seen by Dr. Moya.  An initial echocardiogram showed a severely reduced EF of 14%, and because of this he was referred for cardiac catheterization.  On 3/15/2018, cardiac catheterization showed mild nonobstructive disease in all of his coronaries.  He ultimately underwent a ARIA, which showed an EF of 26-30% and a small mobile echo density in the apex of the left atrial appendage which was concerning for small thrombus.  There were no significant valvular abnormalities, and his LV cavity was moderately dilated.  He ultimately underwent defibrillator placement on 3/16/2018.  He was started on warfarin and Lovenox to bridge for his atrial fibrillation and possible left atrial thrombus.  He also had evidence of a GI bleed, however due to his illness it was decided to defer his colonoscopy to an outpatient procedure.  By the time he was sent home from the hospital, his hemoglobin and hematocrit stabilized and he had no further evidence of melena or hematochezia.  He was eventually discharged home on 3/19/2018 in stable condition.  He has been getting  his INRs checked at his primary care physician's office, and Gauri Peres at our office has been managing these.  Yesterday he was instructed to increase his Lovenox dose as well as his warfarin dose, and to have his INR rechecked on Friday.  He did have his fibrillator interrogated today.  He had 22 nonsustained tachycardia episodes with rates in the 170s to 190s.  It was felt that these were atrial fibrillation with RVR.   Since she's been home he has been doing well.  He denies any chest pain, shortness of breath, edema, dizziness, or syncope.  His main complaint is musculoskeletal pain from CPR.  Fortunately he states that he is completely done with recreational drugs.  He also states that the pharmacy told him that the 75 mg pill of metoprolol was expensive, and he is asking for a different milligram dosage.  He has been compliant with his medications.  He has noticed a little bit of blood in his stool, however he has hemorrhoids and he states that the amount of blood that he is seeing is no unusual for him with his hemorrhoids.  He denies any melena.    Past Medical History:   Diagnosis Date   • Acute kidney failure    • Acute respiratory failure    • Cardiac arrest with ventricular fibrillation    • Diabetes mellitus    • Hypertension    • Pneumonia    • Substance abuse        Past Surgical History:   Procedure Laterality Date   • CARDIAC CATHETERIZATION N/A 3/15/2018    Procedure: Left Heart Cath and right heart cath;  Surgeon: Valentin Moya MD;  Location: [facility] CATH INVASIVE LOCATION;  Service: Cardiovascular   • CARDIAC ELECTROPHYSIOLOGY PROCEDURE N/A 3/16/2018    Procedure: Device Implant-[place]    ICD;  Surgeon: Robert Jauregui MD;  Location: [facility] CATH INVASIVE LOCATION;  Service: Cardiovascular       Social History     Social History   • Marital status: Single     Spouse name: N/A   • Number of children: N/A   • Years of education: N/A     Occupational History   • Not on file.     Social History Main  Topics   • Smoking status: Never Smoker   • Smokeless tobacco: Never Used   • Alcohol use 1.2 oz/week     2 Cans of beer per week      Comment: 2-3 beers 2 x's month   • Drug use:      Frequency: 3.0 times per week     Types: Cocaine      Comment: used today   • Sexual activity: Defer     Other Topics Concern   • Not on file     Social History Narrative   • No narrative on file       Family History   Problem Relation Age of Onset   • Alcohol abuse Maternal Aunt    • No Known Problems Mother    • Heart disease Father    • Atrial fibrillation Father    • No Known Problems Maternal Grandmother    • No Known Problems Maternal Grandfather    • No Known Problems Paternal Grandmother    • No Known Problems Paternal Grandfather        Review of Systems   Constitution: Negative for chills, fever and malaise/fatigue.   Cardiovascular: Negative for chest pain, dyspnea on exertion, leg swelling, near-syncope, orthopnea, palpitations, paroxysmal nocturnal dyspnea and syncope.   Respiratory: Negative for cough and shortness of breath.    Musculoskeletal: Positive for myalgias. Negative for joint pain and joint swelling.   Gastrointestinal: Positive for hemorrhoids. Negative for abdominal pain, diarrhea, melena, nausea and vomiting.   Genitourinary: Negative for frequency and hematuria.   Neurological: Negative for light-headedness, numbness, paresthesias and seizures.   Allergic/Immunologic: Negative.    All other systems reviewed and are negative.      Allergies   Allergen Reactions   • Penicillins Rash         Current Outpatient Prescriptions:   •  atorvastatin (LIPITOR) 40 MG tablet, Take 40 mg by mouth Daily., Disp: , Rfl:   •  digoxin (LANOXIN) 250 MCG tablet, Take 1 tablet by mouth Daily., Disp: 30 tablet, Rfl: 0  •  enoxaparin (LOVENOX) 80 MG/0.8ML solution syringe, Inject 0.8 mL under the skin Every 12 (Twelve) Hours., Disp: 14 syringe, Rfl: 0  •  furosemide (LASIX) 40 MG tablet, Take 1 tablet by mouth Daily., Disp: 30  "tablet, Rfl: 0  •  insulin glargine (LANTUS) 100 UNIT/ML injection, Inject 29 Units under the skin Every Night., Disp: , Rfl:   •  lisinopril (PRINIVIL,ZESTRIL) 10 MG tablet, Take 10 mg by mouth Daily., Disp: , Rfl:   •  metoprolol tartrate 75 MG tablet, Take 75 mg by mouth Every 12 (Twelve) Hours., Disp: 60 tablet, Rfl: 0  •  pantoprazole (PROTONIX) 40 MG EC tablet, Take 1 tablet by mouth Daily., Disp: 30 tablet, Rfl: 0  •  potassium chloride (MICRO-K) 10 MEQ CR capsule, Take 2 capsules by mouth Daily., Disp: 60 capsule, Rfl: 0  •  warfarin (COUMADIN) 5 MG tablet, Take 1 tablet by mouth Daily., Disp: 30 tablet, Rfl: 0      Objective:     Vitals:    03/28/18 0933 03/28/18 0956   BP: 118/60 124/64   BP Location: Right arm Left arm   Pulse: 84    SpO2: 99%    Weight: 132 kg (291 lb)    Height: 200.7 cm (79\")      Body mass index is 32.78 kg/m².    PHYSICAL EXAM:    Physical Exam   Constitutional: He is oriented to person, place, and time. He appears well-developed and well-nourished. No distress.   HENT:   Head: Normocephalic and atraumatic.   Eyes: Pupils are equal, round, and reactive to light.   Neck: No JVD present. No thyromegaly present.   Cardiovascular: Normal rate, regular rhythm, normal heart sounds and intact distal pulses.    No murmur heard.  Right radial cath site well healed without erythema or echymosis, palpable proximal and distal pulses, good capillary refill   Pulmonary/Chest: Effort normal and breath sounds normal. No respiratory distress. He has no rales.   Abdominal: Soft. Bowel sounds are normal. He exhibits no distension and no ascites. There is no splenomegaly or hepatomegaly. There is no tenderness.   Musculoskeletal: Normal range of motion. He exhibits no edema.   Neurological: He is alert and oriented to person, place, and time.   Skin: Skin is warm and dry. He is not diaphoretic. No erythema.   Left subclavian pacemaker incision well healed without erythema or edema.   Psychiatric: He has " a normal mood and affect. His behavior is normal. Judgment normal.         ECG 12 Lead  Date/Time: 3/28/2018 10:02 AM  Performed by: SIMBA BRANTLEY.  Authorized by: SIMBA BRANTLEY   Comparison: compared with previous ECG from 3/13/2018  Similar to previous ECG  Rhythm: atrial fibrillation  Ectopy: PVCs  BPM: 84  Clinical impression: abnormal ECG  Comments: Indication: Atrial fibrillation, congestive heart failure.              Assessment:       Diagnosis Plan   1. Paroxysmal atrial fibrillation  ECG 12 Lead   2. Cardiac arrest with ventricular fibrillation  Ambulatory Referral to Cardiac Rehab   3. Cardiomyopathy, unspecified type  Ambulatory Referral to Cardiac Rehab     Orders Placed This Encounter   Procedures   • Ambulatory Referral to Cardiac Rehab     Referral Priority:   Routine     Referral Type:   Rehabilitation - Outpatient     Number of Visits Requested:   1   • ECG 12 Lead     This order was created via procedure documentation          Plan:       1.  Atrial Fibrillation and Atrial Flutter  Assessment  • The patient has paroxysmal atrial fibrillation  • This is non-valvular in etiology  • The patient's CHADS2-VASc score is 3  • A [id number]-GEAc score of 2 or more is considered a high risk for a thromboembolic event  • Warfarin prescribed    Plan  • Continue in atrial fibrillation with rate control  • Continue warfarin for antithrombotic therapy, bleeding issues discussed  • Continue beta blocker and digoxin for rate control    Subjective - Objective  • He is in atrial fibrillation today and his rate is controlled.  It looks like according to his pacemaker interrogation he has had a few high rates.  He is anticoagulated with warfarin, and we are working on getting his INR therapeutic.  He is going to be having it rechecked on Friday, until then he is being bridged with Lovenox.  He is having a little bleeding from hemorrhoids, however he denies any melena.  I've asked him to follow up with his primary  care physician and to have her arrange for an outpatient colonoscopy.      Coronary Artery Disease  2.  Heart Failure  Assessment  • NYHA class I - There is no limitation of physical activity. Physical activity does not cause fatigue, palpitations or shortness of breath.  • The patient was newly diagnosed with heart failure within the past 12 months  • Beta blocker prescribed  • Diuretics prescribed  • Angiotensin receptor blocker (ARB) prescribed  • Left ventricular function is severely reduced by qualitative assessment    Plan  • The heart failure care plan was discussed with the patient today including: continuing the current program    Subjective/Objective    • Physical exam findings negative for rales, peripheral edema, elevated JVP, hepatomegaly and ascites.  • The patient has an ICD implant  • Overall he is doing well and seems to be compliant with his medications.  I did discuss the importance of taking his medications regularly with him, as well as avoiding recreational drugs which is most likely what causes cardiomyopathy in the first place.  I think that the entire ordeal of being in the hospital after his cardiac arrest scared him sufficiently to keep him only from drugs and alcohol.  At any rate, he's doing well and we will most likely recheck an echocardiogram in 3-6 months.      An echo to make any changes to his medical regimen today.  He will follow-up with Dr. Moya on 5/1/2018 or sooner if needed.  As always, it has been a pleasure to participate in your patient's care.      Sincerely,         Shazia Kuo PA-C

## 2018-03-29 ENCOUNTER — TELEPHONE (OUTPATIENT)
Dept: CARDIOLOGY | Facility: CLINIC | Age: 53
End: 2018-03-29

## 2018-03-29 RX ORDER — METOPROLOL TARTRATE 75 MG/1
50 TABLET, FILM COATED ORAL EVERY 12 HOURS SCHEDULED
Qty: 90 TABLET | Refills: 12 | Status: SHIPPED | OUTPATIENT
Start: 2018-03-29 | End: 2018-04-20

## 2018-03-30 ENCOUNTER — HOSPITAL ENCOUNTER (OUTPATIENT)
Dept: CARDIOLOGY | Facility: HOSPITAL | Age: 53
Setting detail: RECURRING SERIES
Discharge: HOME | End: 2018-03-30

## 2018-03-30 PROCEDURE — 85610 PROTHROMBIN TIME: CPT

## 2018-03-30 PROCEDURE — 36416 COLLJ CAPILLARY BLOOD SPEC: CPT

## 2018-03-30 NOTE — TELEPHONE ENCOUNTER
OK, he can come to the clinic the next time his INR is due.  Please call him back and let him know the hours, it's a walk in clinic.

## 2018-04-02 ENCOUNTER — HOSPITAL ENCOUNTER (OUTPATIENT)
Dept: CARDIOLOGY | Facility: HOSPITAL | Age: 53
Setting detail: RECURRING SERIES
Discharge: HOME | End: 2018-04-02

## 2018-04-02 PROCEDURE — 85610 PROTHROMBIN TIME: CPT

## 2018-04-02 PROCEDURE — 36416 COLLJ CAPILLARY BLOOD SPEC: CPT

## 2018-04-09 ENCOUNTER — HOSPITAL ENCOUNTER (OUTPATIENT)
Dept: CARDIOLOGY | Facility: HOSPITAL | Age: 53
Setting detail: RECURRING SERIES
Discharge: HOME | End: 2018-04-09

## 2018-04-09 PROCEDURE — 85610 PROTHROMBIN TIME: CPT

## 2018-04-09 PROCEDURE — 36416 COLLJ CAPILLARY BLOOD SPEC: CPT

## 2018-04-09 RX ORDER — WARFARIN SODIUM 5 MG/1
5 TABLET ORAL
Qty: 30 TABLET | Refills: 0 | Status: SHIPPED | OUTPATIENT
Start: 2018-04-09 | End: 2018-05-01 | Stop reason: SDUPTHER

## 2018-04-11 ENCOUNTER — TELEPHONE (OUTPATIENT)
Dept: CARDIAC REHAB | Facility: HOSPITAL | Age: 53
End: 2018-04-11

## 2018-04-11 NOTE — TELEPHONE ENCOUNTER
Patient returned our call today.  One of my co-workers had left a message several weeks ago in response to a referral from [facility].  The patient states he currently has no insurance and is dealing with a lot of bills coming in, so he isn't sure he can afford cardiac rehab.  He does report that he is walking 15-20 minutes a day, seven days a week, and is taking all of his medication as prescribed.  I did let him know about Cardiac Rehab Phase III and the cost of that program; however, he lives in [place], [place], so he would be closer to [place] or [facility].  I provided contact numbers for both of those programs and encouraged him to call to check on what their costs are for Phase III.  I also reviewed the AHA guidelines for exercise with the patient.  His goal is to return to work in [employer] and states a friend has a position lined up for him as soon as he gets the okay to return to work from Dr. Moya.

## 2018-04-16 ENCOUNTER — HOSPITAL ENCOUNTER (OUTPATIENT)
Dept: CARDIOLOGY | Facility: HOSPITAL | Age: 53
Setting detail: RECURRING SERIES
Discharge: HOME | End: 2018-04-16

## 2018-04-16 ENCOUNTER — TELEPHONE (OUTPATIENT)
Dept: CARDIOLOGY | Facility: CLINIC | Age: 53
End: 2018-04-16

## 2018-04-16 PROCEDURE — 85610 PROTHROMBIN TIME: CPT

## 2018-04-16 PROCEDURE — 36416 COLLJ CAPILLARY BLOOD SPEC: CPT

## 2018-04-16 NOTE — TELEPHONE ENCOUNTER
Pt called and said he has some questions about his medications. He is wanting to know if he is to continue all meds and if so he will need refills.........Tamy

## 2018-04-18 RX ORDER — DIGOXIN 250 MCG
250 TABLET ORAL
Qty: 30 TABLET | Refills: 6 | Status: SHIPPED | OUTPATIENT
Start: 2018-04-18 | End: 2018-05-14 | Stop reason: SDUPTHER

## 2018-04-18 RX ORDER — POTASSIUM CHLORIDE 750 MG/1
20 CAPSULE, EXTENDED RELEASE ORAL DAILY
Qty: 60 CAPSULE | Refills: 6 | Status: SHIPPED | OUTPATIENT
Start: 2018-04-18 | End: 2019-10-02

## 2018-04-18 RX ORDER — PANTOPRAZOLE SODIUM 40 MG/1
40 TABLET, DELAYED RELEASE ORAL DAILY
Qty: 30 TABLET | Refills: 6 | Status: ON HOLD | OUTPATIENT
Start: 2018-04-18 | End: 2018-05-17

## 2018-04-20 ENCOUNTER — CLINICAL SUPPORT NO REQUIREMENTS (OUTPATIENT)
Dept: CARDIOLOGY | Facility: CLINIC | Age: 53
End: 2018-04-20

## 2018-04-20 DIAGNOSIS — I49.01 VENTRICULAR FIBRILLATION (HCC): Primary | ICD-10-CM

## 2018-04-20 DIAGNOSIS — I42.9 CARDIOMYOPATHY, UNSPECIFIED TYPE (HCC): ICD-10-CM

## 2018-04-20 DIAGNOSIS — I48.0 PAROXYSMAL ATRIAL FIBRILLATION (HCC): ICD-10-CM

## 2018-04-20 RX ORDER — METOPROLOL TARTRATE 100 MG/1
100 TABLET ORAL 2 TIMES DAILY
Qty: 60 TABLET | Refills: 6 | Status: SHIPPED | OUTPATIENT
Start: 2018-04-20 | End: 2018-06-19

## 2018-04-23 ENCOUNTER — CLINICAL SUPPORT NO REQUIREMENTS (OUTPATIENT)
Dept: CARDIOLOGY | Facility: CLINIC | Age: 53
End: 2018-04-23

## 2018-04-23 ENCOUNTER — HOSPITAL ENCOUNTER (OUTPATIENT)
Dept: CARDIOLOGY | Facility: HOSPITAL | Age: 53
Setting detail: RECURRING SERIES
Discharge: HOME | End: 2018-04-23

## 2018-04-23 ENCOUNTER — TELEPHONE (OUTPATIENT)
Dept: CARDIOLOGY | Facility: CLINIC | Age: 53
End: 2018-04-23

## 2018-04-23 DIAGNOSIS — I49.01 CARDIAC ARREST WITH VENTRICULAR FIBRILLATION (HCC): Primary | ICD-10-CM

## 2018-04-23 DIAGNOSIS — I46.9 CARDIAC ARREST WITH VENTRICULAR FIBRILLATION (HCC): Primary | ICD-10-CM

## 2018-04-23 PROCEDURE — 85610 PROTHROMBIN TIME: CPT

## 2018-04-23 PROCEDURE — 36416 COLLJ CAPILLARY BLOOD SPEC: CPT

## 2018-04-25 ENCOUNTER — TELEPHONE (OUTPATIENT)
Dept: CARDIOLOGY | Facility: CLINIC | Age: 53
End: 2018-04-25

## 2018-04-25 ENCOUNTER — CLINICAL SUPPORT NO REQUIREMENTS (OUTPATIENT)
Dept: CARDIOLOGY | Facility: CLINIC | Age: 53
End: 2018-04-25

## 2018-04-25 DIAGNOSIS — I42.9 CARDIOMYOPATHY, UNSPECIFIED TYPE (HCC): Primary | ICD-10-CM

## 2018-04-25 NOTE — TELEPHONE ENCOUNTER
I called to get dosage on pt's K-flex and had to L/M for him to call me back. It was hand written and is not in the computer yet. I told him I needed dosage from bottle and what pharmacy he wanted me to send to. I need to send in a weeks worth............Tamy

## 2018-04-26 RX ORDER — CEPHALEXIN 500 MG/1
500 CAPSULE ORAL 3 TIMES DAILY
Qty: 21 CAPSULE | Refills: 0 | Status: SHIPPED | OUTPATIENT
Start: 2018-04-26 | End: 2018-05-14

## 2018-05-01 ENCOUNTER — HOSPITAL ENCOUNTER (OUTPATIENT)
Dept: CARDIOLOGY | Facility: HOSPITAL | Age: 53
Setting detail: RECURRING SERIES
Discharge: HOME | End: 2018-05-01

## 2018-05-01 ENCOUNTER — CLINICAL SUPPORT NO REQUIREMENTS (OUTPATIENT)
Dept: CARDIOLOGY | Facility: CLINIC | Age: 53
End: 2018-05-01

## 2018-05-01 DIAGNOSIS — I49.01 CARDIAC ARREST WITH VENTRICULAR FIBRILLATION (HCC): Primary | ICD-10-CM

## 2018-05-01 DIAGNOSIS — I46.9 CARDIAC ARREST WITH VENTRICULAR FIBRILLATION (HCC): Primary | ICD-10-CM

## 2018-05-01 PROCEDURE — 36416 COLLJ CAPILLARY BLOOD SPEC: CPT

## 2018-05-01 PROCEDURE — 85610 PROTHROMBIN TIME: CPT

## 2018-05-01 RX ORDER — WARFARIN SODIUM 5 MG/1
TABLET ORAL
Qty: 144 TABLET | Refills: 0 | Status: SHIPPED | OUTPATIENT
Start: 2018-05-01 | End: 2018-07-25 | Stop reason: SDUPTHER

## 2018-05-01 NOTE — TELEPHONE ENCOUNTER
Pt seen in clinic for incision check. Incision looks unchanged from previous descriptions, except the open area appears drier. Pt is taking his extended course of Keflex. Gauri Peres into evaluate the incision. No changes at the time. We will see pt back on Monday, when JM is in clinic. Pt is to call if there are any changes in the incision or if he develops a fever. CTB

## 2018-05-07 ENCOUNTER — CLINICAL SUPPORT NO REQUIREMENTS (OUTPATIENT)
Dept: CARDIOLOGY | Facility: CLINIC | Age: 53
End: 2018-05-07

## 2018-05-07 DIAGNOSIS — I49.01 VENTRICULAR FIBRILLATION (HCC): Primary | ICD-10-CM

## 2018-05-11 ENCOUNTER — CLINICAL SUPPORT NO REQUIREMENTS (OUTPATIENT)
Dept: CARDIOLOGY | Facility: CLINIC | Age: 53
End: 2018-05-11

## 2018-05-11 DIAGNOSIS — I42.9 CARDIOMYOPATHY, UNSPECIFIED TYPE (HCC): Primary | ICD-10-CM

## 2018-05-11 DIAGNOSIS — I49.01 VENTRICULAR FIBRILLATION (HCC): ICD-10-CM

## 2018-05-14 ENCOUNTER — OFFICE VISIT (OUTPATIENT)
Dept: CARDIOLOGY | Facility: CLINIC | Age: 53
End: 2018-05-14

## 2018-05-14 ENCOUNTER — CLINICAL SUPPORT NO REQUIREMENTS (OUTPATIENT)
Dept: CARDIOLOGY | Facility: CLINIC | Age: 53
End: 2018-05-14

## 2018-05-14 ENCOUNTER — HOSPITAL ENCOUNTER (OUTPATIENT)
Dept: CARDIOLOGY | Facility: HOSPITAL | Age: 53
Setting detail: RECURRING SERIES
Discharge: HOME | End: 2018-05-14

## 2018-05-14 VITALS
HEART RATE: 60 BPM | DIASTOLIC BLOOD PRESSURE: 78 MMHG | WEIGHT: 297.8 LBS | SYSTOLIC BLOOD PRESSURE: 120 MMHG | BODY MASS INDEX: 34.46 KG/M2 | HEIGHT: 78 IN

## 2018-05-14 DIAGNOSIS — I49.01 CARDIAC ARREST WITH VENTRICULAR FIBRILLATION (HCC): Primary | ICD-10-CM

## 2018-05-14 DIAGNOSIS — I42.0 DILATED CARDIOMYOPATHY (HCC): Primary | ICD-10-CM

## 2018-05-14 DIAGNOSIS — I42.0 DILATED CARDIOMYOPATHY (HCC): ICD-10-CM

## 2018-05-14 DIAGNOSIS — I48.0 PAROXYSMAL ATRIAL FIBRILLATION (HCC): ICD-10-CM

## 2018-05-14 DIAGNOSIS — I46.9 CARDIAC ARREST WITH VENTRICULAR FIBRILLATION (HCC): Primary | ICD-10-CM

## 2018-05-14 DIAGNOSIS — I49.01 VENTRICULAR FIBRILLATION (HCC): ICD-10-CM

## 2018-05-14 PROCEDURE — 99214 OFFICE O/P EST MOD 30 MIN: CPT | Performed by: INTERNAL MEDICINE

## 2018-05-14 PROCEDURE — 36416 COLLJ CAPILLARY BLOOD SPEC: CPT

## 2018-05-14 PROCEDURE — 93000 ELECTROCARDIOGRAM COMPLETE: CPT | Performed by: INTERNAL MEDICINE

## 2018-05-14 PROCEDURE — 85610 PROTHROMBIN TIME: CPT

## 2018-05-14 RX ORDER — DIGOXIN 250 MCG
250 TABLET ORAL
Qty: 30 TABLET | Refills: 6 | Status: SHIPPED | OUTPATIENT
Start: 2018-05-14 | End: 2018-06-19

## 2018-05-14 NOTE — PROGRESS NOTES
Date of Office Visit: 2018  Encounter Provider: Valentin Moya MD  Place of Service: [facility] CARDIOLOGY  Patient Name: Shannan Arguelles  :1965  [id number]    Chief Complaint   Patient presents with   • Cardiomyopathy   • Atrial Fibrillation   :     HPI: Shannan Arguelles is a 52 y.o. male  this is a young man who had been drinking and using cocaine had a VF arrest in the end of March I took him directly to the Cath Lab he had some mild nonobstructive coronary disease 10% disease in his circumflex 20-30% and is scattered throughout his RCA in his LAD was normal his EF was severely reduced and calcium in his coronaries.  He also was in A. fib with RVR at that point and we managed him medically we will have him see Dr. Jauregui he placed an ICD prophylactically he's done very well since then he has not had any syncope no palpitations no chest pain no shortness of breath.  He's been clean is not using drugs or drinking.  He has an opening on his ICD that the EP service is aware of and has been managing.  He's been therapeutic on his INR for 3 weeks in a row    Past Medical History:   Diagnosis Date   • Acute kidney failure    • Acute respiratory failure    • Atrial fibrillation    • Cardiac arrest with ventricular fibrillation    • Cardiomyopathy    • Diabetes mellitus    • Hypertension    • Pneumonia    • Substance abuse        Past Surgical History:   Procedure Laterality Date   • CARDIAC CATHETERIZATION N/A 3/15/2018    Procedure: Left Heart Cath and right heart cath;  Surgeon: Valentin Moya MD;  Location: [facility] INVASIVE LOCATION;  Service: Cardiovascular   • CARDIAC ELECTROPHYSIOLOGY PROCEDURE N/A 3/16/2018    Procedure: Device Implant-[place]    ICD;  Surgeon: Robert Jauregui MD;  Location: [facility] INVASIVE LOCATION;  Service: Cardiovascular       Social History     Social History   • Marital status: Single     Spouse name: N/A   • Number of children: N/A   • Years  of education: N/A     Occupational History   • Not on file.     Social History Main Topics   • Smoking status: Never Smoker   • Smokeless tobacco: Never Used   • Alcohol use 1.2 oz/week     2 Cans of beer per week      Comment: 2-3 beers 2 x's month   • Drug use:      Frequency: 3.0 times per week     Types: Cocaine      Comment: used today   • Sexual activity: Defer     Other Topics Concern   • Not on file     Social History Narrative   • No narrative on file       Family History   Problem Relation Age of Onset   • Alcohol abuse Maternal Aunt    • No Known Problems Mother    • Heart disease Father    • Atrial fibrillation Father    • No Known Problems Maternal Grandmother    • No Known Problems Maternal Grandfather    • No Known Problems Paternal Grandmother    • No Known Problems Paternal Grandfather        Review of Systems   Constitution: Negative for decreased appetite, fever, malaise/fatigue and weight loss.   HENT: Negative for nosebleeds.    Eyes: Negative for double vision.   Cardiovascular: Negative for chest pain, claudication, cyanosis, dyspnea on exertion, irregular heartbeat, leg swelling, near-syncope, orthopnea, palpitations, paroxysmal nocturnal dyspnea and syncope.   Respiratory: Negative for cough, hemoptysis and shortness of breath.    Hematologic/Lymphatic: Negative for bleeding problem.   Skin: Negative for rash.   Musculoskeletal: Negative for falls and myalgias.   Gastrointestinal: Negative for hematochezia, jaundice, melena, nausea and vomiting.   Genitourinary: Negative for hematuria.   Neurological: Negative for dizziness and seizures.   Psychiatric/Behavioral: Negative for altered mental status and memory loss.       Allergies   Allergen Reactions   • Penicillins Rash         Current Outpatient Prescriptions:   •  atorvastatin (LIPITOR) 40 MG tablet, Take 40 mg by mouth Daily., Disp: , Rfl:   •  digoxin (LANOXIN) 250 MCG tablet, Take 1 tablet by mouth Daily., Disp: 30 tablet, Rfl: 6  •   "furosemide (LASIX) 40 MG tablet, Take 1 tablet by mouth Daily., Disp: 30 tablet, Rfl: 0  •  insulin glargine (LANTUS) 100 UNIT/ML injection, Inject 29 Units under the skin Every Night., Disp: , Rfl:   •  lisinopril (PRINIVIL,ZESTRIL) 10 MG tablet, Take 10 mg by mouth Daily., Disp: , Rfl:   •  metoprolol tartrate (LOPRESSOR) 100 MG tablet, Take 1 tablet by mouth 2 (Two) Times a Day., Disp: 60 tablet, Rfl: 6  •  pantoprazole (PROTONIX) 40 MG EC tablet, Take 1 tablet by mouth Daily., Disp: 30 tablet, Rfl: 6  •  potassium chloride (MICRO-K) 10 MEQ CR capsule, Take 2 capsules by mouth Daily., Disp: 60 capsule, Rfl: 6  •  warfarin (COUMADIN) 5 MG tablet, Take 2 tablets Mon, wed, fri and 1 1/2 tablets all other days, Disp: 144 tablet, Rfl: 0      Objective:     Vitals:    05/14/18 0913   BP: 120/78   Pulse: 60   Weight: 135 kg (297 lb 12.8 oz)   Height: 200.7 cm (79\")     Body mass index is 33.55 kg/m².    Physical Exam   Constitutional: He is oriented to person, place, and time. He appears well-developed and well-nourished.   HENT:   Head: Normocephalic.   Eyes: No scleral icterus.   Neck: No JVD present. No thyromegaly present.   Cardiovascular: Normal rate, regular rhythm and normal heart sounds.  Exam reveals no gallop and no friction rub.    No murmur heard.  Pulmonary/Chest: Effort normal and breath sounds normal. He has no wheezes. He has no rales.       Abdominal: Soft. There is no hepatosplenomegaly. There is no tenderness.   Musculoskeletal: Normal range of motion. He exhibits no edema.   Lymphadenopathy:     He has no cervical adenopathy.   Neurological: He is alert and oriented to person, place, and time.   Skin: Skin is warm and dry. No rash noted.   Psychiatric: He has a normal mood and affect.         ECG 12 Lead  Date/Time: 5/14/2018 9:53 AM  Performed by: KEYA NAVA  Authorized by: KEYA NAVA   Comparison: compared with previous ECG   Similar to previous ECG  Rhythm: atrial " fibrillation  Conduction: non-specific intraventricular conduction delay  Clinical impression: abnormal ECG  Comments: ns ST-T wave abnormality               Assessment:       Diagnosis Plan   1. Cardiac arrest with ventricular fibrillation     2. Paroxysmal atrial fibrillation     3. Dilated cardiomyopathy            Plan:       Several issues with this gentleman.  #1 nonischemic cardiomyopathy pretty severe on good medical therapy but we had him on metoprolol tartrate which is not ideal because he needed rate control we were having difficulty controlling his A. fib and lead to have him on Coreg or long-acting metoprolol.  But I would like to do with him is probably give him a shot and came back into sinus rhythm and then stopping his digoxin and switching him from tartrate to either carvedilol or long-acting metoprolol and then reechoing him and seeing what his LV function this.  Dr. Jauregui wants to think about this.  And would have him come back and see me in 6 weeks we are going to check his pro time today and if it's therapeutic I will be more than 4 weeks since he has been on warfarin.  He had a ARIA and there was a small left atrial appendage thrombus on it 6 weeks ago    Atrial Fibrillation and Atrial Flutter  Assessment  • The patient has paroxysmal atrial fibrillation  • This is non-valvular in etiology  • The patient's CHADS2-VASc score is 1  • A [id number] score of 1 is considered an intermediate risk for a thromboembolic event  • Warfarin prescribed    Plan  • Attempt to maintain sinus rhythm  • Continue warfarin for antithrombotic therapy, bleeding issues discussed  • Continue beta blocker and digoxin for rate control      As always, it has been a pleasure to participate in your patient's care.      Sincerely,       Valentin Moya MD    Addendum: Evidently there is an issue with insurance for this gentleman.  He is critically ill we then best a lot of time and effort to save his life.  We need to  test his defibrillator and we be to try and get him into a normal rhythm when he did do it soon.  Transferred his care at this critical juncture is not in his best interest and may even end up harming him.  Bring him in for defibrillator checking and defibrillated him at that point try and get him in the sinus.  Not allowing us to do this is not in his best interest or good care for him

## 2018-05-15 ENCOUNTER — TELEPHONE (OUTPATIENT)
Dept: CARDIOLOGY | Facility: CLINIC | Age: 53
End: 2018-05-15

## 2018-05-15 NOTE — TELEPHONE ENCOUNTER
Dr. Moya,   Can you please document why we are not able to cancel the procedure at [facility] due to the patients condition? This is the patient that I discussed with you about moving his procedure to [facility].    Thanks, Francesca

## 2018-05-16 NOTE — PERIOPERATIVE NURSING NOTE
CALLED AND SPOKE WITH PT MOTHER AND CONFIRMED PROCEDURE ARRIVAL TIME AND NEED TO BE NPO AFTER MN. MAY TAKE AM MEDS EXCEPT DIURETIC AND DIABETIC MEDS. MOTHER TO ACCOMPANY PT TO HOSPITAL IN THE AM.

## 2018-05-17 ENCOUNTER — HOSPITAL ENCOUNTER (OUTPATIENT)
Facility: HOSPITAL | Age: 53
Setting detail: HOSPITAL OUTPATIENT SURGERY
Discharge: HOME | End: 2018-05-17
Attending: INTERNAL MEDICINE | Admitting: INTERNAL MEDICINE

## 2018-05-17 VITALS
HEART RATE: 72 BPM | HEIGHT: 78 IN | OXYGEN SATURATION: 98 % | RESPIRATION RATE: 16 BRPM | TEMPERATURE: 98.5 F | BODY MASS INDEX: 33.9 KG/M2 | SYSTOLIC BLOOD PRESSURE: 111 MMHG | WEIGHT: 293 LBS | DIASTOLIC BLOOD PRESSURE: 95 MMHG

## 2018-05-17 DIAGNOSIS — I46.9 CARDIAC ARREST WITH VENTRICULAR FIBRILLATION (HCC): Primary | ICD-10-CM

## 2018-05-17 DIAGNOSIS — I42.0 DILATED CARDIOMYOPATHY (HCC): ICD-10-CM

## 2018-05-17 DIAGNOSIS — I48.19 PERSISTENT ATRIAL FIBRILLATION (HCC): ICD-10-CM

## 2018-05-17 DIAGNOSIS — I49.01 CARDIAC ARREST WITH VENTRICULAR FIBRILLATION (HCC): Primary | ICD-10-CM

## 2018-05-17 LAB
ANION GAP SERPL CALCULATED.3IONS-SCNC: 9.7 MMOL/L
BUN BLD-MCNC: 21 MG/DL (ref 6–20)
BUN/CREAT SERPL: 21.9 (ref 7–25)
CALCIUM SPEC-SCNC: 9 MG/DL (ref 8.6–10.5)
CHLORIDE SERPL-SCNC: 102 MMOL/L (ref 98–107)
CO2 SERPL-SCNC: 27.3 MMOL/L (ref 22–29)
CREAT BLD-MCNC: 0.96 MG/DL (ref 0.76–1.27)
GFR SERPL CREATININE-BSD FRML MDRD: 83 ML/MIN/1.73
GLUCOSE BLD-MCNC: 216 MG/DL (ref 65–99)
GLUCOSE BLDC GLUCOMTR-MCNC: 196 MG/DL (ref 70–130)
INR PPP: 2 (ref 0.8–1.2)
INR PPP: 2 (ref 0.9–1.1)
POTASSIUM BLD-SCNC: 4.2 MMOL/L (ref 3.5–5.2)
PROTHROMBIN TIME: 22.9 SECONDS
PROTHROMBIN TIME: 22.9 SECONDS (ref 12.8–15.2)
SODIUM BLD-SCNC: 139 MMOL/L (ref 136–145)

## 2018-05-17 PROCEDURE — 92960 CARDIOVERSION ELECTRIC EXT: CPT | Performed by: INTERNAL MEDICINE

## 2018-05-17 PROCEDURE — 93010 ELECTROCARDIOGRAM REPORT: CPT | Performed by: INTERNAL MEDICINE

## 2018-05-17 PROCEDURE — 82962 GLUCOSE BLOOD TEST: CPT

## 2018-05-17 PROCEDURE — 93005 ELECTROCARDIOGRAM TRACING: CPT | Performed by: INTERNAL MEDICINE

## 2018-05-17 PROCEDURE — 93642 EP EVL 1/2CHMB TRNSVNS CVDFB: CPT | Performed by: INTERNAL MEDICINE

## 2018-05-17 PROCEDURE — 93005 ELECTROCARDIOGRAM TRACING: CPT | Performed by: NURSE PRACTITIONER

## 2018-05-17 PROCEDURE — 85610 PROTHROMBIN TIME: CPT

## 2018-05-17 PROCEDURE — 99152 MOD SED SAME PHYS/QHP 5/>YRS: CPT

## 2018-05-17 PROCEDURE — 80048 BASIC METABOLIC PNL TOTAL CA: CPT | Performed by: INTERNAL MEDICINE

## 2018-05-17 RX ORDER — SODIUM CHLORIDE 0.9 % (FLUSH) 0.9 %
1-10 SYRINGE (ML) INJECTION AS NEEDED
Status: DISCONTINUED | OUTPATIENT
Start: 2018-05-17 | End: 2018-05-17 | Stop reason: HOSPADM

## 2018-05-17 RX ORDER — SODIUM CHLORIDE 9 MG/ML
250 INJECTION, SOLUTION INTRAVENOUS ONCE AS NEEDED
Status: DISCONTINUED | OUTPATIENT
Start: 2018-05-17 | End: 2018-05-17 | Stop reason: HOSPADM

## 2018-05-17 RX ORDER — SODIUM CHLORIDE 9 MG/ML
75 INJECTION, SOLUTION INTRAVENOUS CONTINUOUS
Status: DISCONTINUED | OUTPATIENT
Start: 2018-05-17 | End: 2018-05-17 | Stop reason: HOSPADM

## 2018-05-17 RX ORDER — LIDOCAINE HYDROCHLORIDE 10 MG/ML
0.1 INJECTION, SOLUTION EPIDURAL; INFILTRATION; INTRACAUDAL; PERINEURAL ONCE AS NEEDED
Status: DISCONTINUED | OUTPATIENT
Start: 2018-05-17 | End: 2018-05-17 | Stop reason: HOSPADM

## 2018-05-17 RX ORDER — CETIRIZINE HYDROCHLORIDE 10 MG/1
10 TABLET ORAL AS NEEDED
COMMUNITY
End: 2020-12-22

## 2018-05-17 RX ADMIN — SODIUM CHLORIDE 75 ML/HR: 9 INJECTION, SOLUTION INTRAVENOUS at 10:40

## 2018-05-17 NOTE — DISCHARGE INSTRUCTIONS
TAKE COUMADIN 12.5 MG BY MOUTH TONIGHT AND HAVE INR CHECKED TOMORROW      SEDATION DISCHARGE INSTRUCTIONS.    IMPORTANT: The following information will help you return to your best level of health.  Sedation.  You have had a procedure that called for some medicine to reduce anxiety and pain. This medicine (or medicines) is called  sedation. After receiving the medicine, you may be sleepy, but able to breathe on your own. The effects of the medicine may last for several hours.    Follow these instructions after sedation:   Go right home. Rest quietly at home today, then you can be up and about.   Do not drink alcohol, drive or operate machinery for 24 hours.   Do not do anything where light-headedness or clumsiness would be dangerous.   Do not make important decisions or sign any legal papers for the next 24 hours.   Make sure A RESPONSIBLE PERSON stays with you the rest of today and overnight for your protection and safety.   Start your diet with fluids and light foods (jello, soup, juice, toast). Then, slowly progress to your usual diet if you are not sick to your stomach.    Call your doctor if you have:   a gray or blue skin color.   excess sleepiness.   repeated vomiting.   trouble breathing.   any new problems or concerns.

## 2018-05-18 ENCOUNTER — HOSPITAL ENCOUNTER (OUTPATIENT)
Dept: CARDIOLOGY | Facility: HOSPITAL | Age: 53
Setting detail: RECURRING SERIES
Discharge: HOME | End: 2018-05-18

## 2018-05-18 ENCOUNTER — TELEPHONE (OUTPATIENT)
Dept: CARDIOLOGY | Facility: HOSPITAL | Age: 53
End: 2018-05-18

## 2018-05-18 PROCEDURE — 36416 COLLJ CAPILLARY BLOOD SPEC: CPT

## 2018-05-18 PROCEDURE — 85610 PROTHROMBIN TIME: CPT

## 2018-05-22 ENCOUNTER — HOSPITAL ENCOUNTER (OUTPATIENT)
Dept: CARDIOLOGY | Facility: HOSPITAL | Age: 53
Setting detail: RECURRING SERIES
Discharge: HOME | End: 2018-05-22

## 2018-05-22 PROCEDURE — 36416 COLLJ CAPILLARY BLOOD SPEC: CPT

## 2018-05-22 PROCEDURE — 85610 PROTHROMBIN TIME: CPT

## 2018-05-22 NOTE — H&P (VIEW-ONLY)
Date of Office Visit: 2018  Encounter Provider: Valentin Moya MD  Place of Service: [facility] CARDIOLOGY  Patient Name: Shannan Arguelles  :1965  [id number]    Chief Complaint   Patient presents with   • Cardiomyopathy   • Atrial Fibrillation   :     HPI: Shannan Arguelles is a 52 y.o. male  this is a young man who had been drinking and using cocaine had a VF arrest in the end of March I took him directly to the Cath Lab he had some mild nonobstructive coronary disease 10% disease in his circumflex 20-30% and is scattered throughout his RCA in his LAD was normal his EF was severely reduced and calcium in his coronaries.  He also was in A. fib with RVR at that point and we managed him medically we will have him see Dr. Jauregui he placed an ICD prophylactically he's done very well since then he has not had any syncope no palpitations no chest pain no shortness of breath.  He's been clean is not using drugs or drinking.  He has an opening on his ICD that the EP service is aware of and has been managing.  He's been therapeutic on his INR for 3 weeks in a row    Past Medical History:   Diagnosis Date   • Acute kidney failure    • Acute respiratory failure    • Atrial fibrillation    • Cardiac arrest with ventricular fibrillation    • Cardiomyopathy    • Diabetes mellitus    • Hypertension    • Pneumonia    • Substance abuse        Past Surgical History:   Procedure Laterality Date   • CARDIAC CATHETERIZATION N/A 3/15/2018    Procedure: Left Heart Cath and right heart cath;  Surgeon: Valentin Moya MD;  Location: [facility] INVASIVE LOCATION;  Service: Cardiovascular   • CARDIAC ELECTROPHYSIOLOGY PROCEDURE N/A 3/16/2018    Procedure: Device Implant-[place]    ICD;  Surgeon: Robert Jauregui MD;  Location: [facility] INVASIVE LOCATION;  Service: Cardiovascular       Social History     Social History   • Marital status: Single     Spouse name: N/A   • Number of children: N/A   • Years  of education: N/A     Occupational History   • Not on file.     Social History Main Topics   • Smoking status: Never Smoker   • Smokeless tobacco: Never Used   • Alcohol use 1.2 oz/week     2 Cans of beer per week      Comment: 2-3 beers 2 x's month   • Drug use:      Frequency: 3.0 times per week     Types: Cocaine      Comment: used today   • Sexual activity: Defer     Other Topics Concern   • Not on file     Social History Narrative   • No narrative on file       Family History   Problem Relation Age of Onset   • Alcohol abuse Maternal Aunt    • No Known Problems Mother    • Heart disease Father    • Atrial fibrillation Father    • No Known Problems Maternal Grandmother    • No Known Problems Maternal Grandfather    • No Known Problems Paternal Grandmother    • No Known Problems Paternal Grandfather        Review of Systems   Constitution: Negative for decreased appetite, fever, malaise/fatigue and weight loss.   HENT: Negative for nosebleeds.    Eyes: Negative for double vision.   Cardiovascular: Negative for chest pain, claudication, cyanosis, dyspnea on exertion, irregular heartbeat, leg swelling, near-syncope, orthopnea, palpitations, paroxysmal nocturnal dyspnea and syncope.   Respiratory: Negative for cough, hemoptysis and shortness of breath.    Hematologic/Lymphatic: Negative for bleeding problem.   Skin: Negative for rash.   Musculoskeletal: Negative for falls and myalgias.   Gastrointestinal: Negative for hematochezia, jaundice, melena, nausea and vomiting.   Genitourinary: Negative for hematuria.   Neurological: Negative for dizziness and seizures.   Psychiatric/Behavioral: Negative for altered mental status and memory loss.       Allergies   Allergen Reactions   • Penicillins Rash         Current Outpatient Prescriptions:   •  atorvastatin (LIPITOR) 40 MG tablet, Take 40 mg by mouth Daily., Disp: , Rfl:   •  digoxin (LANOXIN) 250 MCG tablet, Take 1 tablet by mouth Daily., Disp: 30 tablet, Rfl: 6  •   "furosemide (LASIX) 40 MG tablet, Take 1 tablet by mouth Daily., Disp: 30 tablet, Rfl: 0  •  insulin glargine (LANTUS) 100 UNIT/ML injection, Inject 29 Units under the skin Every Night., Disp: , Rfl:   •  lisinopril (PRINIVIL,ZESTRIL) 10 MG tablet, Take 10 mg by mouth Daily., Disp: , Rfl:   •  metoprolol tartrate (LOPRESSOR) 100 MG tablet, Take 1 tablet by mouth 2 (Two) Times a Day., Disp: 60 tablet, Rfl: 6  •  pantoprazole (PROTONIX) 40 MG EC tablet, Take 1 tablet by mouth Daily., Disp: 30 tablet, Rfl: 6  •  potassium chloride (MICRO-K) 10 MEQ CR capsule, Take 2 capsules by mouth Daily., Disp: 60 capsule, Rfl: 6  •  warfarin (COUMADIN) 5 MG tablet, Take 2 tablets Mon, wed, fri and 1 1/2 tablets all other days, Disp: 144 tablet, Rfl: 0      Objective:     Vitals:    05/14/18 0913   BP: 120/78   Pulse: 60   Weight: 135 kg (297 lb 12.8 oz)   Height: 200.7 cm (79\")     Body mass index is 33.55 kg/m².    Physical Exam   Constitutional: He is oriented to person, place, and time. He appears well-developed and well-nourished.   HENT:   Head: Normocephalic.   Eyes: No scleral icterus.   Neck: No JVD present. No thyromegaly present.   Cardiovascular: Normal rate, regular rhythm and normal heart sounds.  Exam reveals no gallop and no friction rub.    No murmur heard.  Pulmonary/Chest: Effort normal and breath sounds normal. He has no wheezes. He has no rales.       Abdominal: Soft. There is no hepatosplenomegaly. There is no tenderness.   Musculoskeletal: Normal range of motion. He exhibits no edema.   Lymphadenopathy:     He has no cervical adenopathy.   Neurological: He is alert and oriented to person, place, and time.   Skin: Skin is warm and dry. No rash noted.   Psychiatric: He has a normal mood and affect.         ECG 12 Lead  Date/Time: 5/14/2018 9:53 AM  Performed by: KEYA NAVA  Authorized by: KEYA NAVA   Comparison: compared with previous ECG   Similar to previous ECG  Rhythm: atrial " fibrillation  Conduction: non-specific intraventricular conduction delay  Clinical impression: abnormal ECG  Comments: ns ST-T wave abnormality               Assessment:       Diagnosis Plan   1. Cardiac arrest with ventricular fibrillation     2. Paroxysmal atrial fibrillation     3. Dilated cardiomyopathy            Plan:       Several issues with this gentleman.  #1 nonischemic cardiomyopathy pretty severe on good medical therapy but we had him on metoprolol tartrate which is not ideal because he needed rate control we were having difficulty controlling his A. fib and lead to have him on Coreg or long-acting metoprolol.  But I would like to do with him is probably give him a shot and came back into sinus rhythm and then stopping his digoxin and switching him from tartrate to either carvedilol or long-acting metoprolol and then reechoing him and seeing what his LV function this.  Dr. Jauregui wants to think about this.  And would have him come back and see me in 6 weeks we are going to check his pro time today and if it's therapeutic I will be more than 4 weeks since he has been on warfarin.  He had a ARIA and there was a small left atrial appendage thrombus on it 6 weeks ago    Atrial Fibrillation and Atrial Flutter  Assessment  • The patient has paroxysmal atrial fibrillation  • This is non-valvular in etiology  • The patient's CHADS2-VASc score is 1  • A [id number] score of 1 is considered an intermediate risk for a thromboembolic event  • Warfarin prescribed    Plan  • Attempt to maintain sinus rhythm  • Continue warfarin for antithrombotic therapy, bleeding issues discussed  • Continue beta blocker and digoxin for rate control      As always, it has been a pleasure to participate in your patient's care.      Sincerely,       Valentin Moya MD    Addendum: Evidently there is an issue with insurance for this gentleman.  He is critically ill we then best a lot of time and effort to save his life.  We need to  test his defibrillator and we be to try and get him into a normal rhythm when he did do it soon.  Transferred his care at this critical juncture is not in his best interest and may even end up harming him.  Bring him in for defibrillator checking and defibrillated him at that point try and get him in the sinus.  Not allowing us to do this is not in his best interest or good care for him

## 2018-05-25 ENCOUNTER — TELEPHONE (OUTPATIENT)
Dept: CARDIOLOGY | Facility: CLINIC | Age: 53
End: 2018-05-25

## 2018-05-25 NOTE — TELEPHONE ENCOUNTER
Mr. Arguelles called requesting to know what he need to do to get medical records to the [facility]? Informed pt he must come into office and sign release of information form and fax information for company he request to have information sent to. Pt states he will come to office today or Tuesday. Thanks, Beatris

## 2018-05-29 ENCOUNTER — HOSPITAL ENCOUNTER (OUTPATIENT)
Dept: CARDIOLOGY | Facility: HOSPITAL | Age: 53
Setting detail: RECURRING SERIES
Discharge: HOME | End: 2018-05-29

## 2018-05-29 PROCEDURE — 85610 PROTHROMBIN TIME: CPT

## 2018-05-29 PROCEDURE — 36416 COLLJ CAPILLARY BLOOD SPEC: CPT

## 2018-06-18 ENCOUNTER — CLINICAL SUPPORT NO REQUIREMENTS (OUTPATIENT)
Dept: CARDIOLOGY | Facility: CLINIC | Age: 53
End: 2018-06-18

## 2018-06-18 DIAGNOSIS — I49.01 VENTRICULAR FIBRILLATION (HCC): Primary | ICD-10-CM

## 2018-06-18 PROCEDURE — 93282 PRGRMG EVAL IMPLANTABLE DFB: CPT | Performed by: INTERNAL MEDICINE

## 2018-06-18 RX ORDER — FUROSEMIDE 40 MG/1
40 TABLET ORAL DAILY
Qty: 90 TABLET | Refills: 2 | Status: SHIPPED | OUTPATIENT
Start: 2018-06-18 | End: 2019-10-02

## 2018-06-19 ENCOUNTER — OFFICE VISIT (OUTPATIENT)
Dept: CARDIOLOGY | Facility: CLINIC | Age: 53
End: 2018-06-19

## 2018-06-19 VITALS
WEIGHT: 300.4 LBS | HEIGHT: 78 IN | SYSTOLIC BLOOD PRESSURE: 120 MMHG | DIASTOLIC BLOOD PRESSURE: 72 MMHG | HEART RATE: 67 BPM | BODY MASS INDEX: 34.76 KG/M2

## 2018-06-19 DIAGNOSIS — E11.65 POORLY CONTROLLED DIABETES MELLITUS (HCC): ICD-10-CM

## 2018-06-19 DIAGNOSIS — I49.01 CARDIAC ARREST WITH VENTRICULAR FIBRILLATION (HCC): Primary | ICD-10-CM

## 2018-06-19 DIAGNOSIS — I42.0 DILATED CARDIOMYOPATHY (HCC): ICD-10-CM

## 2018-06-19 DIAGNOSIS — I48.19 PERSISTENT ATRIAL FIBRILLATION (HCC): ICD-10-CM

## 2018-06-19 DIAGNOSIS — I46.9 CARDIAC ARREST WITH VENTRICULAR FIBRILLATION (HCC): Primary | ICD-10-CM

## 2018-06-19 PROCEDURE — 93000 ELECTROCARDIOGRAM COMPLETE: CPT | Performed by: INTERNAL MEDICINE

## 2018-06-19 PROCEDURE — 99212 OFFICE O/P EST SF 10 MIN: CPT | Performed by: INTERNAL MEDICINE

## 2018-06-19 RX ORDER — CARVEDILOL 25 MG/1
25 TABLET ORAL 2 TIMES DAILY
Qty: 180 TABLET | Refills: 3 | Status: SHIPPED | OUTPATIENT
Start: 2018-06-19 | End: 2019-07-18 | Stop reason: SDUPTHER

## 2018-06-19 NOTE — PROGRESS NOTES
Date of Office Visit: 2018  Encounter Provider: Valenitn Moya MD  Place of Service: [facility] CARDIOLOGY  Patient Name: Shannan Arguelles  :1965  [id number]    Chief Complaint   Patient presents with   • Atrial Fibrillation   :     HPI: Shannan Arguelles is a 52 y.o. male  this is a young man who had been drinking and using cocaine had a VF arrest in the end of March I took him directly to the Cath Lab he had some mild nonobstructive coronary disease 10% disease in his circumflex 20-30% and is scattered throughout his RCA in his LAD was normal his EF was severely reduced and calcium in his coronaries.  He also was in A. fib with RVR at that point and we managed him medically we will have him see Dr. Jauregui he placed an ICD prophylactically.  We have had him get cardioverted about a month ago when they were checking his ICD and he has not noticed any change, but he is back in AFib. He feels good in general overall. He has not been using any substances that are bad for him. No heart failure symptoms. No PND, orthopnea, or edema.        Past Medical History:   Diagnosis Date   • Acute respiratory failure    • Arrhythmia    • Atrial fibrillation    • Cardiac arrest with ventricular fibrillation    • Cardiomyopathy    • Diabetes mellitus    • Hyperlipidemia    • Hypertension    • Pneumonia    • Sleep apnea    • Substance abuse        Past Surgical History:   Procedure Laterality Date   • CARDIAC CATHETERIZATION N/A 3/15/2018    Procedure: Left Heart Cath and right heart cath;  Surgeon: Valentin Moya MD;  Location: [facility] INVASIVE LOCATION;  Service: Cardiovascular   • CARDIAC ELECTROPHYSIOLOGY PROCEDURE N/A 3/16/2018    Procedure: Device Implant-[place]    ICD;  Surgeon: Robert Jauregui MD;  Location: [facility] INVASIVE LOCATION;  Service: Cardiovascular   • CARDIAC ELECTROPHYSIOLOGY PROCEDURE N/A 2018    Procedure: NIPS  Threshold test of his ICD-[place]  Needs to be  scheduled once he has had therapeutic INR's for 3-4 weeks in a row.;  Surgeon: Robert Jauregui MD;  Location:  [facility] CATH INVASIVE LOCATION;  Service: Cardiology   • CARDIAC ELECTROPHYSIOLOGY PROCEDURE N/A 5/17/2018    Procedure: Cardioversion;  Surgeon: Robert Jauregui MD;  Location:  [facility] CATH INVASIVE LOCATION;  Service: Cardiology   • COLONOSCOPY     • INSERT / REPLACE / REMOVE PACEMAKER      ICD [place] PLACED 3/16/18   • KNEE ARTHROSCOPY Bilateral        Social History     Social History   • Marital status: Single     Spouse name: N/A   • Number of children: N/A   • Years of education: N/A     Occupational History   • Not on file.     Social History Main Topics   • Smoking status: Never Smoker   • Smokeless tobacco: Never Used   • Alcohol use 1.2 oz/week     2 Cans of beer per week      Comment: NO DRINKING SINCE 3/2018, HX DRINKING  ETOH   • Drug use: Yes     Types: Cocaine      Comment: LAST USED IN MARCH 2018   • Sexual activity: Defer     Other Topics Concern   • Not on file     Social History Narrative   • No narrative on file       Family History   Problem Relation Age of Onset   • Alcohol abuse Maternal Aunt    • No Known Problems Mother    • Heart disease Father    • Atrial fibrillation Father    • No Known Problems Maternal Grandmother    • No Known Problems Maternal Grandfather    • No Known Problems Paternal Grandmother    • No Known Problems Paternal Grandfather        Review of Systems   Constitution: Negative for decreased appetite, fever, malaise/fatigue and weight loss.   HENT: Negative for nosebleeds.    Eyes: Negative for double vision.   Cardiovascular: Negative for chest pain, claudication, cyanosis, dyspnea on exertion, irregular heartbeat, leg swelling, near-syncope, orthopnea, palpitations, paroxysmal nocturnal dyspnea and syncope.   Respiratory: Negative for cough, hemoptysis and shortness of breath.    Hematologic/Lymphatic: Negative for bleeding problem.   Skin: Negative for rash.  "  Musculoskeletal: Negative for falls and myalgias.   Gastrointestinal: Negative for hematochezia, jaundice, melena, nausea and vomiting.   Genitourinary: Negative for hematuria.   Neurological: Negative for dizziness and seizures.   Psychiatric/Behavioral: Negative for altered mental status and memory loss.       Allergies   Allergen Reactions   • Penicillins Rash         Current Outpatient Prescriptions:   •  atorvastatin (LIPITOR) 40 MG tablet, Take 40 mg by mouth Daily., Disp: , Rfl:   •  cetirizine (zyrTEC) 10 MG tablet, Take 10 mg by mouth Daily., Disp: , Rfl:   •  furosemide (LASIX) 40 MG tablet, Take 1 tablet by mouth Daily., Disp: 90 tablet, Rfl: 2  •  insulin glargine (LANTUS) 100 UNIT/ML injection, Inject 29 Units under the skin Every Night., Disp: , Rfl:   •  lisinopril (PRINIVIL,ZESTRIL) 10 MG tablet, Take 10 mg by mouth Daily., Disp: , Rfl:   •  potassium chloride (MICRO-K) 10 MEQ CR capsule, Take 2 capsules by mouth Daily., Disp: 60 capsule, Rfl: 6  •  warfarin (COUMADIN) 5 MG tablet, Take 2 tablets Mon, wed, fri and 1 1/2 tablets all other days (Patient taking differently: Take 5 mg by mouth Every Night. Take 2 tablets Mon, wed, fri and 1 1/2 tablets all other days), Disp: 144 tablet, Rfl: 0  •  carvedilol (COREG) 25 MG tablet, Take 1 tablet by mouth 2 (Two) Times a Day., Disp: 180 tablet, Rfl: 3      Objective:     Vitals:    06/19/18 1339   BP: 120/72   Pulse: 67   Weight: (!) 136 kg (300 lb 6.4 oz)   Height: 198.1 cm (78\")     Body mass index is 34.71 kg/m².    Physical Exam   Constitutional: He is oriented to person, place, and time. He appears well-developed and well-nourished.   HENT:   Head: Normocephalic.   Eyes: No scleral icterus.   Neck: No JVD present. No thyromegaly present.   Cardiovascular: Normal rate, regular rhythm and normal heart sounds.  Exam reveals no gallop and no friction rub.    No murmur heard.  Pulmonary/Chest: Effort normal and breath sounds normal. He has no wheezes. He " has no rales.       Abdominal: Soft. There is no hepatosplenomegaly. There is no tenderness.   Musculoskeletal: Normal range of motion. He exhibits no edema.   Lymphadenopathy:     He has no cervical adenopathy.   Neurological: He is alert and oriented to person, place, and time.   Skin: Skin is warm and dry. No rash noted.   Psychiatric: He has a normal mood and affect.         ECG 12 Lead  Date/Time: 6/19/2018 2:08 PM  Performed by: KEYA NAVA  Authorized by: KEYA NAVA   Comparison: compared with previous ECG   Similar to previous ECG  Rhythm: atrial fibrillation  Clinical impression: abnormal ECG  Comments: ns ST-T wave abnormality                 Assessment:       Diagnosis Plan   1. Cardiac arrest with ventricular fibrillation     2. Persistent atrial fibrillation     3. Poorly controlled diabetes mellitus     4. Dilated cardiomyopathy            Plan:       He has a severe dilated nonischemic cardiomyopathy on good medical therapy although ominous switch him off of tartrate to carvedilol most in a stop his date since his rate seems to be well-controlled I'm and have him if he can continue to get his care with side like to get an echo on him and see where he has sores LV function goes to continue his anticoagulation his wound over his pacer site is healed up    Atrial Fibrillation and Atrial Flutter  Assessment  • The patient has paroxysmal atrial fibrillation  • This is non-valvular in etiology  • The patient's CHADS2-VASc score is 1  • A [id number] score of 1 is considered an intermediate risk for a thromboembolic event  • Warfarin prescribed    Plan  • Attempt to maintain sinus rhythm  • Continue warfarin for antithrombotic therapy, bleeding issues discussed  • Continue beta blocker for rate control    Heart Failure  Assessment  • NYHA class I - There is no limitation of physical activity. Physical activity does not cause fatigue, palpitations or shortness of breath.  • Beta blocker  prescribed  • Diuretics prescribed  • Angiotensin receptor blocker (ARB) prescribed  • Left ventricular function is severely reduced by qualitative assessment    Plan  • The patient has received heart failure education on the following topics: symptom management and weight monitoring  • The heart failure care plan was discussed with the patient today including: continuing the current program and lifestyle modifications    Subjective/Objective    • Physical exam findings negative for rales and elevated JVP.  • The patient has an ICD implant      As always, it has been a pleasure to participate in your patient's care.      Sincerely,       Valentin Moya MD

## 2018-06-26 ENCOUNTER — HOSPITAL ENCOUNTER (OUTPATIENT)
Dept: CARDIOLOGY | Facility: HOSPITAL | Age: 53
Setting detail: RECURRING SERIES
Discharge: HOME | End: 2018-06-26

## 2018-06-26 PROCEDURE — 36416 COLLJ CAPILLARY BLOOD SPEC: CPT

## 2018-06-26 PROCEDURE — 85610 PROTHROMBIN TIME: CPT

## 2018-07-13 ENCOUNTER — TELEPHONE (OUTPATIENT)
Dept: CARDIOLOGY | Facility: CLINIC | Age: 53
End: 2018-07-13

## 2018-07-13 NOTE — TELEPHONE ENCOUNTER
Patient calling to check on status of you referring him to a cardiologist at [facility] due to Insurance.    Please advise or call patient at [phone].

## 2018-07-18 ENCOUNTER — HOSPITAL ENCOUNTER (OUTPATIENT)
Dept: CARDIOLOGY | Facility: HOSPITAL | Age: 53
Setting detail: RECURRING SERIES
Discharge: HOME | End: 2018-07-18

## 2018-07-18 PROCEDURE — 85610 PROTHROMBIN TIME: CPT

## 2018-07-18 PROCEDURE — 36416 COLLJ CAPILLARY BLOOD SPEC: CPT

## 2018-07-25 RX ORDER — WARFARIN SODIUM 5 MG/1
5 TABLET ORAL NIGHTLY
Qty: 180 TABLET | Refills: 0 | Status: SHIPPED | OUTPATIENT
Start: 2018-07-25 | End: 2018-07-26 | Stop reason: SDUPTHER

## 2018-07-26 RX ORDER — WARFARIN SODIUM 5 MG/1
TABLET ORAL
Qty: 180 TABLET | Refills: 0 | Status: SHIPPED | OUTPATIENT
Start: 2018-07-26 | End: 2019-07-18 | Stop reason: SDUPTHER

## 2018-08-06 DIAGNOSIS — I42.8 CARDIOMYOPATHY, NONISCHEMIC (HCC): Primary | ICD-10-CM

## 2018-09-04 ENCOUNTER — CONVERSION ENCOUNTER (OUTPATIENT)
Dept: CARDIOLOGY | Facility: CLINIC | Age: 53
End: 2018-09-04

## 2018-09-26 ENCOUNTER — HOSPITAL ENCOUNTER (OUTPATIENT)
Dept: LAB | Facility: HOSPITAL | Age: 53
Setting detail: SPECIMEN
Discharge: HOME | End: 2018-09-26
Attending: INTERNAL MEDICINE | Admitting: INTERNAL MEDICINE

## 2018-10-03 ENCOUNTER — CONVERSION ENCOUNTER (OUTPATIENT)
Dept: CARDIOLOGY | Facility: CLINIC | Age: 53
End: 2018-10-03

## 2018-12-04 ENCOUNTER — CONVERSION ENCOUNTER (OUTPATIENT)
Dept: CARDIOLOGY | Facility: CLINIC | Age: 53
End: 2018-12-04

## 2018-12-17 ENCOUNTER — CONVERSION ENCOUNTER (OUTPATIENT)
Dept: CARDIOLOGY | Facility: CLINIC | Age: 53
End: 2018-12-17

## 2019-01-11 ENCOUNTER — CONVERSION ENCOUNTER (OUTPATIENT)
Dept: CARDIOLOGY | Facility: CLINIC | Age: 54
End: 2019-01-11

## 2019-02-14 ENCOUNTER — CONVERSION ENCOUNTER (OUTPATIENT)
Dept: CARDIOLOGY | Facility: CLINIC | Age: 54
End: 2019-02-14

## 2019-03-27 ENCOUNTER — HOSPITAL ENCOUNTER (OUTPATIENT)
Dept: LAB | Facility: HOSPITAL | Age: 54
Setting detail: SPECIMEN
Discharge: HOME | End: 2019-03-27
Attending: INTERNAL MEDICINE | Admitting: INTERNAL MEDICINE

## 2019-03-27 LAB
ALBUMIN SERPL-MCNC: 3.9 G/DL (ref 3.5–4.8)
ALBUMIN/GLOB SERPL: 1.3 {RATIO} (ref 1–1.7)
ALP SERPL-CCNC: 81 IU/L (ref 32–91)
ALT SERPL-CCNC: 21 IU/L (ref 17–63)
ANION GAP SERPL CALC-SCNC: 11.8 MMOL/L (ref 10–20)
AST SERPL-CCNC: 22 IU/L (ref 15–41)
BILIRUB SERPL-MCNC: 0.6 MG/DL (ref 0.3–1.2)
BUN SERPL-MCNC: 14 MG/DL (ref 8–20)
BUN/CREAT SERPL: 14 (ref 6.2–20.3)
CALCIUM SERPL-MCNC: 9 MG/DL (ref 8.9–10.3)
CHLORIDE SERPL-SCNC: 104 MMOL/L (ref 101–111)
CHOLEST SERPL-MCNC: 117 MG/DL
CHOLEST/HDLC SERPL: 2.7 {RATIO}
CONV CO2: 27 MMOL/L (ref 22–32)
CONV LDL CHOLESTEROL DIRECT: 62 MG/DL (ref 0–100)
CONV MICROALBUM.,U,RANDOM: 3 MG/L
CONV TOTAL PROTEIN: 6.9 G/DL (ref 6.1–7.9)
CREAT 24H UR-MCNC: 252.2 MG/DL
CREAT UR-MCNC: 1 MG/DL (ref 0.7–1.2)
GLOBULIN UR ELPH-MCNC: 3 G/DL (ref 2.5–3.8)
GLUCOSE SERPL-MCNC: 65 MG/DL (ref 65–99)
HDLC SERPL-MCNC: 44 MG/DL
LDLC/HDLC SERPL: 1.4 {RATIO}
LIPID INTERPRETATION: NORMAL
MICROALBUMIN/CREAT UR: 1.2 UG/MG
POTASSIUM SERPL-SCNC: 3.8 MMOL/L (ref 3.6–5.1)
SODIUM SERPL-SCNC: 139 MMOL/L (ref 136–144)
TRIGL SERPL-MCNC: 45 MG/DL
VLDLC SERPL CALC-MCNC: 11.6 MG/DL

## 2019-04-02 ENCOUNTER — CONVERSION ENCOUNTER (OUTPATIENT)
Dept: CARDIOLOGY | Facility: CLINIC | Age: 54
End: 2019-04-02

## 2019-04-03 ENCOUNTER — CONVERSION ENCOUNTER (OUTPATIENT)
Dept: CARDIOLOGY | Facility: CLINIC | Age: 54
End: 2019-04-03

## 2019-06-03 ENCOUNTER — CONVERSION ENCOUNTER (OUTPATIENT)
Dept: CARDIOLOGY | Facility: CLINIC | Age: 54
End: 2019-06-03

## 2019-06-04 VITALS
WEIGHT: 313 LBS | DIASTOLIC BLOOD PRESSURE: 82 MMHG | WEIGHT: 311 LBS | WEIGHT: 315 LBS | HEIGHT: 78 IN | BODY MASS INDEX: 36.26 KG/M2 | HEART RATE: 76 BPM | OXYGEN SATURATION: 97 % | BODY MASS INDEX: 36.52 KG/M2 | DIASTOLIC BLOOD PRESSURE: 80 MMHG | DIASTOLIC BLOOD PRESSURE: 91 MMHG | HEART RATE: 68 BPM | DIASTOLIC BLOOD PRESSURE: 68 MMHG | DIASTOLIC BLOOD PRESSURE: 76 MMHG | WEIGHT: 313.75 LBS | HEART RATE: 51 BPM | OXYGEN SATURATION: 97 % | SYSTOLIC BLOOD PRESSURE: 126 MMHG | WEIGHT: 299 LBS | HEART RATE: 70 BPM | HEART RATE: 63 BPM | WEIGHT: 315 LBS | OXYGEN SATURATION: 97 % | BODY MASS INDEX: 34.59 KG/M2 | DIASTOLIC BLOOD PRESSURE: 82 MMHG | DIASTOLIC BLOOD PRESSURE: 82 MMHG | DIASTOLIC BLOOD PRESSURE: 78 MMHG | DIASTOLIC BLOOD PRESSURE: 80 MMHG | BODY MASS INDEX: 36.52 KG/M2 | BODY MASS INDEX: 35.98 KG/M2 | BODY MASS INDEX: 37.33 KG/M2 | SYSTOLIC BLOOD PRESSURE: 118 MMHG | BODY MASS INDEX: 37.09 KG/M2 | SYSTOLIC BLOOD PRESSURE: 110 MMHG | SYSTOLIC BLOOD PRESSURE: 111 MMHG | SYSTOLIC BLOOD PRESSURE: 136 MMHG | SYSTOLIC BLOOD PRESSURE: 130 MMHG | WEIGHT: 315 LBS | BODY MASS INDEX: 37.09 KG/M2 | WEIGHT: 315 LBS | SYSTOLIC BLOOD PRESSURE: 154 MMHG | SYSTOLIC BLOOD PRESSURE: 126 MMHG | DIASTOLIC BLOOD PRESSURE: 72 MMHG | WEIGHT: 315 LBS | HEART RATE: 64 BPM | WEIGHT: 315 LBS | BODY MASS INDEX: 36.45 KG/M2 | SYSTOLIC BLOOD PRESSURE: 110 MMHG | RESPIRATION RATE: 17 BRPM | OXYGEN SATURATION: 98 % | HEART RATE: 68 BPM | HEART RATE: 86 BPM | HEART RATE: 77 BPM | HEIGHT: 78 IN | SYSTOLIC BLOOD PRESSURE: 104 MMHG | HEIGHT: 78 IN

## 2019-06-04 VITALS
DIASTOLIC BLOOD PRESSURE: 87 MMHG | BODY MASS INDEX: 37.07 KG/M2 | SYSTOLIC BLOOD PRESSURE: 106 MMHG | HEART RATE: 69 BPM | WEIGHT: 315 LBS

## 2019-06-06 NOTE — PROGRESS NOTES
Chief Complaint Acute visit  LE Edema    Recently patient has been having left lower extremity swelling mostly dependent and would get better with keeping 8 elevated.  He also has some  discomfort in the left foot area.    Since I have last seen, the patient has been without any chest discomfort ,shortness of breath, palpitations, dizziness or syncope.  Denies having any headache ,abdominal pain ,nausea, vomiting , diarrhea constipation, loss of weight or loss of appetite.    Denies having any excessive bruising ,hematuria or blood in the stool.    Review of all systems negative except as indicated  HPI ]]]]]]]]]]]]]]]]]]]   impression  ==========  -   Status postcardiac arrest with ventricular fibrillation probably related to cocaine and cardiomyopathy March of 2018    -status post single-chamber ICD ([employer] ) March 2018 by Dr. Jauregui at [facility] .  VVI at 40 per minute.  Status post ICD shock x3 March 2019 due to atrial fibrillation with a rapid ventricular response.    -Nonischemic cardiomyopathy probably secondary to cocaine use  Cardiac catheterization 03/15/2018 -nonobstructive disease - Dr. Moya at [facility]    -status post ICD 03/16/2018    - chronic atrial fibrillation on Coumadin.    -hypertension dyslipidemia diabetes peripheral neuropathy    -status post arthroscopic knee surgery    -nonsmoker    -Family history is positive for coronary artery disease    -allergic to penicillin.  =========  Plan  =============  Mild lower extremity edema probably secondary to venous insufficiency.   patient is not having any angina pectoris or congestive heart failure.  Chronic atrial fibrillation and is on Coumadin.  Patient to have PT INR today and adjust dose.  INR today is  2.4  Patient to have PT INR on a monthly basis.    Recent ICD shocks x3 due to atrial fibrillation with RVR.  Rate is doing better since he has been on Cardizem.  Medications were reviewed and  updated.  Followup in the office in 6 months  With ICD interrogation.  Followup with primary care regarding left lower extremity edema.  Patient is already on Coumadin and INR is therapeutic.  Anticoagulation status was reviewed.  Further plan will depend on patient's progress.  ]]]]]]]]]]]]]]]]]]        Vital Signs:    Patient Profile:    53 Years Old Male  CC:         LE Edema~and pain  Height:     78 inches  Weight:     320.75 pounds  (Measured Weight:  320.75 lbs.  oz.)  BMI:        37.06  Pulse rate: 69 / minute    BP sittin / 87  (left arm)  Cuff size:  regular   Vitals Entered By: Rivka Nava CMA (Marii  3, 2019 1:13 PM)    Medications:  Medications were reviewed with the patient during this visit.    Allergies:   PENICILLIN V POTASSIUM (PENICILLIN V POTASSIUM TABS) (Critical)    Allergies were reviewed with the patient during this visit.    Current Allergies (reviewed today):  PENICILLIN V POTASSIUM (PENICILLIN V POTASSIUM TABS) (Critical)    Current Medications (including medications started today):   MATZIM  MG ORAL TABLET EXTENDED RELEASE 24 HOUR (DILTIAZEM HCL COATED BEADS) take one daily  FISH OIL CAPSULE (OMEGA-3 FATTY ACIDS CAPS) 2 daily  CETIRIZINE HCL 10 MG ORAL TABLET (CETIRIZINE HCL) p.o. qd  KLOR-CON 10 10 MEQ ORAL TABLET EXTENDED RELEASE (POTASSIUM CHLORIDE) Take 2 tablets by mouth daily  ATORVASTATIN CALCIUM 40 MG ORAL TABLET (ATORVASTATIN CALCIUM) Take 1 tablet by mouth daily  LASIX 40 MG ORAL TABLET (FUROSEMIDE) p.o. qd  LOSARTAN POTASSIUM 25 MG ORAL TABLET (LOSARTAN POTASSIUM) take one tablet daily  COREG 25 MG ORAL TABLET (CARVEDILOL) Take one (1) tablet by mouth twice a day  WARFARIN SODIUM 5 MG TABLET (WARFARIN SODIUM) TAKE 2 TABLETS 4 DAYS PER WEEK AND 1.5 TABLETS 3 DAYS PER WEEK OR AS DIRECTED  FREESTYLE FREEDOM LITE W/DEVICE KIT (BLOOD GLUCOSE MONITORING SUPPL) Use to test blood sugar 4-5 times a day.  FREESTYLE LANCETS (LANCETS) Use to check blood sugar 4-5 times a  day  FREESTYLE LITE TEST IN VITRO STRIP (GLUCOSE BLOOD) Use to check blood sugar 4-5 times a day DX E10.65  BD PEN NEEDLE SHORT U/F 31G X 8 MM (INSULIN PEN NEEDLE) USE WITH LANTUS AND NOVOLOG 4 TO 5 TIMES DAILY, BEFORE MEALS AND AT BEDTIME  HUMALOG 100 UNITS/ML KWIKPEN (INSULIN LISPRO) INJECT 1 UNIT:10 G CARB WITH MEALS 3X/DAY, AND 1 UNIT:15 G CARB WITH SNACKS, MAX OF 70 UNITS/DAY.  BASAGLAR 100 UNIT/ML KWIKPEN (INSULIN GLARGINE) INJECT 30 UNITS SUBCUTENEOUSLY IN THE EVENING      Past Medical History:     Reviewed history from 01/26/2016 and no changes required:        Diabetes Type 1   1/ 2004        Periph Neuropathy         Obesity         Hyperlipidemia   4/2009        Hypertension  10/2011    Past Surgical History:     Reviewed history from 03/23/2018 and no changes required:        knee scopes 1982        Defibrilator 3/16/18        Heart Cath 3/15/18    Family History Summary:      Reviewed history Last on 04/03/2019 and no changes required:06/03/2019  Father - Has Family History of Heart Disease - Entered On: 9/4/2018  MGM - Has FH Diabetes - Entered On: 5/19/2016  Mother - Has FH Other Cancer - Entered On: 5/19/2016  Mother - Has FH High Cholesterol - Entered On: 5/19/2016  Mother - Has FH Hypertension - Entered On: 5/19/2016    General Comments - FH:  FH Diabetes- Grandmother   FH Hypertension- Mother, Father     FH Heart Disease- Mother family    Social History:     Reviewed history from 09/04/2018 and no changes required:        Patient has never smoked.        Passive Smoke: N        Alcohol Use: Y        Drug Use: N        HIV/High Risk: N                Single        Occupation:         Risk Factors:     Smoked Tobacco Use:  Never smoker  Smokeless Tobacco Use:  Never  Passive smoke exposure:  no  Drug use:  no  HIV high-risk behavior:  no  Caffeine use:  0 drinks per day  Alcohol use:  yes     Type:  socially  Exercise:  no  Seatbelt use:  100 %  Sun Exposure:  rarely    Family  History Risk Factors:     Family History of MI in females < 65 years old:  no     Family History of MI in males < 55 years old:  yes        Review of Systems   General: No fatigue or tiredness, No change in weight   Eyes: No redness  Cardiovascular: No chest pain, no palpitations  Respiratory: No shortness of breath  Gastrointestinal: No nausea or vomiting, bleeding  Genitourinary: no hematuria or dysuria  Musculoskeletal: No arthralgia or myalgia, + left leg swelling and painfull  Skin: No rash  Neurologic: No numbness, tingling, syncope  Hematologic/Lymphatic: No abnormal bleeding      Physical Exam    General:      The patient is alert, oriented and in no distress.    Vital signs as noted above.    Head and neck revealed no carotid bruits or jugular venous distension.  No thyromegaly or lymphadenopathy is present.    Lungs clear.  No wheezing.  Breath sounds are normal bilaterally.    Heart normal first and second heart sounds.  No murmur or pericardial rub is present.  No gallop is present.    Abdomen soft and nontender.  No organomegaly is present.    Extremities revealed good peripheral pulses  .  1+ edema    Skin warm and dry.ICD site looks normal.    Musculoskeletal system is grossly normal.    CNS grossly normal.      Blood Pressure:  Today's BP: 106/87 mm Hg    Labwork:   Most Recent Lab Results:   LDL: 62 mg/dL 03/27/2019  HbA1c: : 8.5 % 03/27/2019        Impression & Recommendations:    Problem # 1:  Edema leg (ICD-782.3) (AAI90-Q15.0)  Assessment: New    Orders:  91777-Ipx Vst-Est Level IV (CPT-41227)      Problem # 2:  LONG-TERM (CURRENT) USE OF ANTICOAGULANTS (ICD-V58.61) (XKH30-K71.01)  Assessment: Improved    Orders:  86616-Hea Vst-Est Level IV (CPT-24344)      Problem # 3:  Atrial fibrillation, chronic (ICD-427.31) (VOF01-R67.2)  Assessment: Unchanged    His updated medication list for this problem includes:     Coreg 25 Mg Oral Tablet (Carvedilol) ..... Take one (1) tablet by mouth twice a day      Warfarin Sodium 5 Mg Tablet (Warfarin sodium) ..... Take 2 tablets 4 days per week and 1.5 tablets 3 days per week or as directed    Orders:  61432-Hyk Vst-Est Level IV (CPT-31280)      Problem # 4:  Hypertension (ICD-401.9) (ZSC56-F30)  Assessment: Improved    His updated medication list for this problem includes:     Matzim La 180 Mg Oral Tablet Extended Release 24 Hour (Diltiazem hcl coated beads) ..... Take one daily     Lasix 40 Mg Oral Tablet (Furosemide) ..... P.o. qd     Losartan Potassium 25 Mg Oral Tablet (Losartan potassium) ..... Take one tablet daily     Coreg 25 Mg Oral Tablet (Carvedilol) ..... Take one (1) tablet by mouth twice a day    Orders:  75229-Nqy Vst-Est Level IV (CPT-29494)      Problem # 5:  Nonischemic cardiomyopathy (ICD-425.4) (UJM25-Y50.8)  Assessment: Unchanged    Orders:  49197-Fek Vst-Est Level IV (CPT-19632)      Problem # 6:  Hyperlipidemia (ICD-272.4) (OSW25-Y34.5)  Assessment: Improved    His updated medication list for this problem includes:     Atorvastatin Calcium 40 Mg Oral Tablet (Atorvastatin calcium) ..... Take 1 tablet by mouth daily    Orders:  11994-Ohw Vst-Est Level IV (CPT-87531)        Patient Instructions:  1)   followup in the office at a regularly scheduled appointment                Medication Administration    Orders Added:  1)  21093-Flr Vst-Est Level IV [CPT-62317]  ]      Electronically signed by Iliana Rodrigez MD on 06/03/2019 at 1:29 PM  ________________________________________________________________________       Disclaimer: Converted Note message may not contain all data elements that existed in the legacy source system. Please see "[employer] System for the original note details.

## 2019-06-07 NOTE — PROCEDURES
Vitals Entered By: Marti Baird RN (Marii  3, 2019 1:01 PM)    Allergies:   PENICILLIN V POTASSIUM (PENICILLIN V POTASSIUM TABS) (Critical)        Anticoagulation Management History:       The patient is on coumadin and comes in today for a routine follow up visit.  The patient is on Coumadin for chronic atrial fibrillation.  Anticipated length of treatment is unknown.  His last INR was 2.9 and today's INR is 2.4.      Anticoagulation Management Assessment/Plan:       The patient's current anticoagulation dose is Warfarin sodium 5 mg tablet: TAKE 2 TABLETS 4 DAYS PER WEEK AND 1.5 TABLETS 3 DAYS PER WEEK OR AS DIRECTED.  The target INR is 2.0-3.0.      Prior Anticoagulation Instructions:  Continue current dosage and recheck on 6/11/2019 at 3:30 pm    Current Anticoagulation Instructions:  Continiue 7.5mg Sunday Tuesday Thursday,  10mg all other days.    Next appt:  Tuesday July 9. 2019 at 1:45    Weight  320.75  Pulse  69  BP   106 / 87        Electronically signed by Marti Baird RN on 06/03/2019 at 1:08 PM  ________________________________________________________________________       Disclaimer: Converted Note message may not contain all data elements that existed in the legacy source system. Please see [employer] System for the original note details.

## 2019-06-17 RX ORDER — DILTIAZEM HYDROCHLORIDE EXTENDED-RELEASE TABLETS 180 MG/1
180 TABLET, EXTENDED RELEASE ORAL DAILY
Qty: 90 TABLET | Refills: 3 | Status: SHIPPED | OUTPATIENT
Start: 2019-06-17 | End: 2020-11-06 | Stop reason: SDUPTHER

## 2019-06-17 RX ORDER — DILTIAZEM HYDROCHLORIDE EXTENDED-RELEASE TABLETS 180 MG/1
TABLET, EXTENDED RELEASE ORAL
COMMUNITY
Start: 2015-01-28 | End: 2019-06-17 | Stop reason: SDUPTHER

## 2019-07-05 ENCOUNTER — CLINICAL SUPPORT NO REQUIREMENTS (OUTPATIENT)
Dept: CARDIOLOGY | Facility: CLINIC | Age: 54
End: 2019-07-05

## 2019-07-05 DIAGNOSIS — I25.5 ISCHEMIC CARDIOMYOPATHY: Primary | ICD-10-CM

## 2019-07-05 PROCEDURE — 93295 DEV INTERROG REMOTE 1/2/MLT: CPT | Performed by: INTERNAL MEDICINE

## 2019-07-05 PROCEDURE — 93296 REM INTERROG EVL PM/IDS: CPT | Performed by: INTERNAL MEDICINE

## 2019-07-17 ENCOUNTER — TELEPHONE (OUTPATIENT)
Dept: CARDIOLOGY | Facility: CLINIC | Age: 54
End: 2019-07-17

## 2019-07-18 RX ORDER — WARFARIN SODIUM 5 MG/1
TABLET ORAL
Qty: 180 TABLET | Refills: 0 | Status: SHIPPED | OUTPATIENT
Start: 2019-07-18 | End: 2019-10-15 | Stop reason: SDUPTHER

## 2019-07-18 RX ORDER — CARVEDILOL 25 MG/1
25 TABLET ORAL 2 TIMES DAILY
Qty: 180 TABLET | Refills: 1 | Status: SHIPPED | OUTPATIENT
Start: 2019-07-18 | End: 2020-01-08

## 2019-08-22 ENCOUNTER — ANTICOAGULATION VISIT (OUTPATIENT)
Dept: CARDIOLOGY | Facility: CLINIC | Age: 54
End: 2019-08-22

## 2019-08-22 VITALS
WEIGHT: 315 LBS | DIASTOLIC BLOOD PRESSURE: 94 MMHG | BODY MASS INDEX: 36.69 KG/M2 | SYSTOLIC BLOOD PRESSURE: 155 MMHG | HEART RATE: 92 BPM

## 2019-08-22 DIAGNOSIS — I49.01 CARDIAC ARREST WITH VENTRICULAR FIBRILLATION (HCC): ICD-10-CM

## 2019-08-22 DIAGNOSIS — I46.9 CARDIAC ARREST WITH VENTRICULAR FIBRILLATION (HCC): ICD-10-CM

## 2019-08-22 DIAGNOSIS — Z79.01 LONG TERM (CURRENT) USE OF ANTICOAGULANTS: ICD-10-CM

## 2019-08-22 DIAGNOSIS — I48.19 PERSISTENT ATRIAL FIBRILLATION (HCC): ICD-10-CM

## 2019-08-22 LAB — INR PPP: 2.7 (ref 2–3)

## 2019-08-22 PROCEDURE — 85610 PROTHROMBIN TIME: CPT | Performed by: INTERNAL MEDICINE

## 2019-08-22 PROCEDURE — 36416 COLLJ CAPILLARY BLOOD SPEC: CPT | Performed by: INTERNAL MEDICINE

## 2019-09-19 DIAGNOSIS — E10.65 TYPE 1 DIABETES MELLITUS WITH HYPERGLYCEMIA (HCC): ICD-10-CM

## 2019-09-19 DIAGNOSIS — I10 ESSENTIAL HYPERTENSION: Primary | ICD-10-CM

## 2019-09-19 PROBLEM — IMO0002 EXCESSIVE ANTICOAGULATION: Status: ACTIVE | Noted: 2018-09-04

## 2019-09-19 PROBLEM — Z23 ENCOUNTER FOR IMMUNIZATION: Status: ACTIVE | Noted: 2018-10-03

## 2019-09-19 PROBLEM — Z83.3 FAMILY HISTORY OF DIABETES MELLITUS: Status: ACTIVE | Noted: 2019-09-19

## 2019-09-19 PROBLEM — Z95.810 IMPLANTABLE CARDIOVERTER-DEFIBRILLATOR (ICD) IN SITU: Status: ACTIVE | Noted: 2018-09-04

## 2019-09-25 ENCOUNTER — LAB (OUTPATIENT)
Dept: LAB | Facility: HOSPITAL | Age: 54
End: 2019-09-25

## 2019-09-25 DIAGNOSIS — I10 ESSENTIAL HYPERTENSION: ICD-10-CM

## 2019-09-25 DIAGNOSIS — E10.65 TYPE 1 DIABETES MELLITUS WITH HYPERGLYCEMIA (HCC): ICD-10-CM

## 2019-09-25 LAB
ALBUMIN SERPL-MCNC: 3.9 G/DL (ref 3.5–4.8)
ALBUMIN/GLOB SERPL: 1.3 G/DL (ref 1–1.7)
ALP SERPL-CCNC: 93 U/L (ref 32–91)
ALT SERPL W P-5'-P-CCNC: 32 U/L (ref 17–63)
ANION GAP SERPL CALCULATED.3IONS-SCNC: 9.5 MMOL/L (ref 5–15)
AST SERPL-CCNC: 28 U/L (ref 15–41)
BILIRUB SERPL-MCNC: 0.9 MG/DL (ref 0.3–1.2)
BUN BLD-MCNC: 19 MG/DL (ref 8–20)
BUN/CREAT SERPL: 19 (ref 6.2–20.3)
CALCIUM SPEC-SCNC: 8.8 MG/DL (ref 8.9–10.3)
CHLORIDE SERPL-SCNC: 105 MMOL/L (ref 101–111)
CO2 SERPL-SCNC: 29 MMOL/L (ref 22–32)
CREAT BLD-MCNC: 1 MG/DL (ref 0.7–1.2)
GFR SERPL CREATININE-BSD FRML MDRD: 78 ML/MIN/1.73
GLOBULIN UR ELPH-MCNC: 2.9 GM/DL (ref 2.5–3.8)
GLUCOSE BLD-MCNC: 107 MG/DL (ref 65–99)
HBA1C MFR BLD: 8.7 % (ref 3.5–5.6)
POTASSIUM BLD-SCNC: 3.5 MMOL/L (ref 3.6–5.1)
PROT SERPL-MCNC: 6.8 G/DL (ref 6.1–7.9)
SODIUM BLD-SCNC: 140 MMOL/L (ref 136–144)

## 2019-09-25 PROCEDURE — 36415 COLL VENOUS BLD VENIPUNCTURE: CPT

## 2019-09-25 PROCEDURE — 80053 COMPREHEN METABOLIC PANEL: CPT

## 2019-09-25 PROCEDURE — 83036 HEMOGLOBIN GLYCOSYLATED A1C: CPT

## 2019-10-02 ENCOUNTER — OFFICE VISIT (OUTPATIENT)
Dept: ENDOCRINOLOGY | Facility: CLINIC | Age: 54
End: 2019-10-02

## 2019-10-02 VITALS
WEIGHT: 315 LBS | BODY MASS INDEX: 36.45 KG/M2 | HEART RATE: 65 BPM | DIASTOLIC BLOOD PRESSURE: 88 MMHG | SYSTOLIC BLOOD PRESSURE: 134 MMHG | HEIGHT: 78 IN | OXYGEN SATURATION: 97 %

## 2019-10-02 DIAGNOSIS — E78.5 HYPERLIPIDEMIA, UNSPECIFIED HYPERLIPIDEMIA TYPE: ICD-10-CM

## 2019-10-02 DIAGNOSIS — E55.9 VITAMIN D DEFICIENCY: ICD-10-CM

## 2019-10-02 DIAGNOSIS — E10.49 TYPE 1 DIABETES MELLITUS WITH OTHER DIABETIC NEUROLOGICAL COMPLICATION (HCC): Primary | ICD-10-CM

## 2019-10-02 LAB — GLUCOSE BLDC GLUCOMTR-MCNC: 98 MG/DL (ref 70–130)

## 2019-10-02 PROCEDURE — 82962 GLUCOSE BLOOD TEST: CPT | Performed by: INTERNAL MEDICINE

## 2019-10-02 PROCEDURE — 99214 OFFICE O/P EST MOD 30 MIN: CPT | Performed by: INTERNAL MEDICINE

## 2019-10-02 RX ORDER — CHLORAL HYDRATE 500 MG
CAPSULE ORAL
COMMUNITY
End: 2020-12-28

## 2019-10-02 RX ORDER — POTASSIUM CHLORIDE 750 MG/1
CAPSULE, EXTENDED RELEASE ORAL
Qty: 60 CAPSULE | Refills: 6
Start: 2019-10-02 | End: 2020-12-22

## 2019-10-02 RX ORDER — INSULIN LISPRO 100 [IU]/ML
INJECTION, SOLUTION INTRAVENOUS; SUBCUTANEOUS
Refills: 1 | COMMUNITY
Start: 2019-09-08 | End: 2020-03-16

## 2019-10-02 RX ORDER — LOSARTAN POTASSIUM 25 MG/1
25 TABLET ORAL DAILY
COMMUNITY
End: 2020-04-15 | Stop reason: ALTCHOICE

## 2019-10-02 NOTE — PATIENT INSTRUCTIONS
See eye doctor in December.  Increase KCl to 2 a day.  Increase exercise as planned.  Increase Basaglar to 32 units in the afternoon.  Call if blood sugars are running under 100 or over 200.  F/u in 6 months, with fasting labs prior.

## 2019-10-06 NOTE — PROGRESS NOTES
[facility]        Patient Care Team:  Lluvia Sarabia APRN as PCP - Josie Winter MD as Consulting Physician (Endocrinology)    Chief Complaint:    Chief Complaint   Patient presents with   • Diabetes     type 1         Subjective   Here for diabetes f/u  Blood sugars higher in am  Taking KCl one daily. Decreased on his own  Exercise program: walking @ work  Due for eye exam in December    Interval History:     Patient Complaints: none  Patient Denies:  hypoglycemia  History taken from: patient    Review of Systems:   Review of Systems   Eyes: Negative for blurred vision.   Gastrointestinal: Negative for nausea.   Endocrine: Negative for polyuria.   Neurological: Negative for headache.     Gained 1 lb since last visit    Objective     Vital Signs      Vitals:    10/02/19 1428   BP: 134/88   Pulse: 65   SpO2: 97%         Physical Exam:     General Appearance:    Alert, cooperative, in no acute distress. Obese   Head:    Normocephalic, without obvious abnormality, atraumatic   Eyes:            Lids and lashes normal, conjunctivae and sclerae normal, no   icterus, no pallor, corneas clear, PERRLA   Throat:   No oral lesions,  oral mucosa moist   Neck:   No adenopathy, supple,  no thyromegaly, no   carotid bruit   Lungs:     Clear     Heart:    Regular rhythm and normal rate   Chest Wall:    No abnormalities observed   Abdomen:     Normal bowel sounds, soft, obese                 Extremities:   Moves all extremities well, 1+ edema, plantar calluses               Pulses:   Pulses palpable and equal bilaterally   Skin:   Stasis dermatits   Neurologic:  DTR absent, able to feel the 10g monofilament          Results Review:    I have reviewed the patient's new clinical results, labs & imaging.    Medication Review:   Prior to Admission medications    Medication Sig Start Date End Date Taking? Authorizing Provider   atorvastatin (LIPITOR) 40 MG tablet Take 40 mg by mouth Daily.   Yes  Geovanni Ramos MD   carvedilol (COREG) 25 MG tablet Take 1 tablet by mouth 2 (Two) Times a Day. 7/18/19  Yes Iliana Rodrigez MD   cetirizine (zyrTEC) 10 MG tablet Take 10 mg by mouth Daily.   Yes Geovanni Ramos MD   diltiazem LA (MATZIM LA) 180 MG 24 hr tablet Take 1 tablet by mouth Daily. 6/17/19  Yes Iliana Rodrigez MD   glucose blood test strip 1 each by Other route As Needed (freestyle lite test strips... use to check blood sugar three to four times daily dx:e10.65). Use as instructed   Yes Geovanni Ramos MD   HUMALOG KWIKPEN 100 UNIT/ML solution pen-injector INJECT 1 UNIT:10 G CARB WITH MEALS 3X/DAY, AND 1 UNIT:15 G CARB WITH SNACKS, MAX OF 70 UNITS/DAY. 9/8/19  Yes Geovanni Ramos MD   Insulin Glargine (BASAGLAR KWIKPEN SC) 32 units @ 3 p   Yes Geovanni Ramos MD   losartan (COZAAR) 25 MG tablet Take 25 mg by mouth Daily.   Yes Geovanni Ramos MD   Omega-3 Fatty Acids (FISH OIL) 1000 MG capsule capsule Take  by mouth Daily With Breakfast.   Yes Geovanni Ramos MD   potassium chloride (MICRO-K) 10 MEQ CR capsule One tab 2x/d 10/2/19  Yes Josie Montesinos MD   warfarin (COUMADIN) 5 MG tablet Take 1 1/2 tablets on Sunday, Tuesday and Thursday and 2 tablets all other days, or as directed  Patient taking differently: take two tablets four days week and 1.5 tablets three days per week 7/18/19  Yes Iliana Rodrigez MD       Lab Results (most recent)     Procedure Component Value Units Date/Time    POC Glucose Fingerstick [[id number]] Collected:  10/02/19 1426    Specimen:  Blood Updated:  10/02/19 1428     Glucose 98 mg/dL      Comment: ate@830 am this morning, 6 hours ago, insulin@ 130pm today               Lab Results   Component Value Date    HGBA1C 8.7 (H) 09/25/2019    HGBA1C 8.20 (H) 03/17/2018      Lab Results   Component Value Date    GLUCOSE 107 (H) 09/25/2019    BUN 19 09/25/2019    CREATININE 1.00 09/25/2019    EGFRIFNONA 78 09/25/2019    BCR 19.0  09/25/2019    K 3.5 (L) 09/25/2019    CO2 29.0 09/25/2019    CALCIUM 8.8 (L) 09/25/2019    ALBUMIN 3.90 09/25/2019    LABIL2 1.3 03/27/2019    AST 28 09/25/2019    ALT 32 09/25/2019    CHOL 117 03/27/2019    LDL 62 03/27/2019    HDL 44 03/27/2019    TRIG 45 03/27/2019     Lab Results   Component Value Date    TSH 2.950 03/17/2018    FREET4 1.64 03/15/2018       Assessment/Plan     Shannan was seen today for diabetes.    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  Increase KCl to 2 a day.  Increase exercise as planned.  Increase Basaglar to 32 units in the afternoon.  Call if blood sugars are running under 100 or over 200.          Josie Montesinos MD  10/06/19  7:40 PM

## 2019-10-08 ENCOUNTER — CLINICAL SUPPORT NO REQUIREMENTS (OUTPATIENT)
Dept: CARDIOLOGY | Facility: CLINIC | Age: 54
End: 2019-10-08

## 2019-10-08 DIAGNOSIS — I25.5 ISCHEMIC CARDIOMYOPATHY: Primary | ICD-10-CM

## 2019-10-08 DIAGNOSIS — Z95.810 AICD (AUTOMATIC CARDIOVERTER/DEFIBRILLATOR) PRESENT: ICD-10-CM

## 2019-10-15 ENCOUNTER — OFFICE VISIT (OUTPATIENT)
Dept: CARDIOLOGY | Facility: CLINIC | Age: 54
End: 2019-10-15

## 2019-10-15 ENCOUNTER — ANTICOAGULATION VISIT (OUTPATIENT)
Dept: CARDIOLOGY | Facility: CLINIC | Age: 54
End: 2019-10-15

## 2019-10-15 ENCOUNTER — CLINICAL SUPPORT NO REQUIREMENTS (OUTPATIENT)
Dept: CARDIOLOGY | Facility: CLINIC | Age: 54
End: 2019-10-15

## 2019-10-15 VITALS
WEIGHT: 315 LBS | BODY MASS INDEX: 37.33 KG/M2 | DIASTOLIC BLOOD PRESSURE: 91 MMHG | HEART RATE: 86 BPM | SYSTOLIC BLOOD PRESSURE: 137 MMHG

## 2019-10-15 VITALS
WEIGHT: 315 LBS | HEIGHT: 78 IN | DIASTOLIC BLOOD PRESSURE: 91 MMHG | BODY MASS INDEX: 36.45 KG/M2 | SYSTOLIC BLOOD PRESSURE: 137 MMHG | HEART RATE: 86 BPM

## 2019-10-15 DIAGNOSIS — I48.20 CHRONIC ATRIAL FIBRILLATION (HCC): ICD-10-CM

## 2019-10-15 DIAGNOSIS — I25.5 ISCHEMIC CARDIOMYOPATHY: ICD-10-CM

## 2019-10-15 DIAGNOSIS — I49.01 CARDIAC ARREST WITH VENTRICULAR FIBRILLATION (HCC): ICD-10-CM

## 2019-10-15 DIAGNOSIS — Z95.810 IMPLANTABLE CARDIOVERTER-DEFIBRILLATOR (ICD) IN SITU: ICD-10-CM

## 2019-10-15 DIAGNOSIS — Z79.01 LONG TERM (CURRENT) USE OF ANTICOAGULANTS: ICD-10-CM

## 2019-10-15 DIAGNOSIS — E78.2 MIXED HYPERLIPIDEMIA: ICD-10-CM

## 2019-10-15 DIAGNOSIS — I49.01 CARDIAC ARREST WITH VENTRICULAR FIBRILLATION (HCC): Primary | ICD-10-CM

## 2019-10-15 DIAGNOSIS — I46.9 CARDIAC ARREST WITH VENTRICULAR FIBRILLATION (HCC): ICD-10-CM

## 2019-10-15 DIAGNOSIS — I10 ESSENTIAL HYPERTENSION: Primary | ICD-10-CM

## 2019-10-15 DIAGNOSIS — I46.9 CARDIAC ARREST WITH VENTRICULAR FIBRILLATION (HCC): Primary | ICD-10-CM

## 2019-10-15 DIAGNOSIS — I48.0 PAROXYSMAL ATRIAL FIBRILLATION (HCC): ICD-10-CM

## 2019-10-15 LAB — INR PPP: 1.7 (ref 0.9–1.1)

## 2019-10-15 PROCEDURE — 93282 PRGRMG EVAL IMPLANTABLE DFB: CPT | Performed by: INTERNAL MEDICINE

## 2019-10-15 PROCEDURE — 99214 OFFICE O/P EST MOD 30 MIN: CPT | Performed by: INTERNAL MEDICINE

## 2019-10-15 PROCEDURE — 85610 PROTHROMBIN TIME: CPT | Performed by: INTERNAL MEDICINE

## 2019-10-15 PROCEDURE — 93000 ELECTROCARDIOGRAM COMPLETE: CPT | Performed by: INTERNAL MEDICINE

## 2019-10-15 PROCEDURE — 36416 COLLJ CAPILLARY BLOOD SPEC: CPT | Performed by: INTERNAL MEDICINE

## 2019-10-15 RX ORDER — WARFARIN SODIUM 5 MG/1
TABLET ORAL
Qty: 180 TABLET | Refills: 0 | Status: SHIPPED | OUTPATIENT
Start: 2019-10-15 | End: 2020-01-13

## 2019-10-15 NOTE — PROGRESS NOTES
Encounter Date:10/15/2019  Last seen 6/3/2019      Patient ID: Shannan Arguelles is a 54 y.o. male.    Chief Complaint:  Nonischemic cardiomyopathy  ICD  Chronic atrial fibrillation  Hypertension  Dyslipidemia  Anticoagulation management    History of Present Illness  Since I have last seen, the patient has been without any chest discomfort ,shortness of breath, palpitations, dizziness or syncope.  Denies having any headache ,abdominal pain ,nausea, vomiting , diarrhea constipation, loss of weight or loss of appetite.  Denies having any excessive bruising ,hematuria or blood in the stool.    Denies having any ICD shocks.     Review of all systems negative except as indicated    Assessment and Plan         ]]]]]]]]]]]]]]]]]]]   impression  ==========  -   Status postcardiac arrest with ventricular fibrillation probably related to cocaine and cardiomyopathy March of 2018    -status post single-chamber ICD ([employer] ) March 2018 by Dr. Jauregui at [facility] .  VVI at 40 per minute.  Status post ICD shock x3 March 2019 due to atrial fibrillation with a rapid ventricular response.    -Nonischemic cardiomyopathy probably secondary to cocaine use  Cardiac catheterization 03/15/2018 -nonobstructive disease - Dr. Moya at [facility]    -status post ICD 03/16/2018    - chronic atrial fibrillation on Coumadin.    -hypertension dyslipidemia diabetes peripheral neuropathy    -status post arthroscopic knee surgery    -nonsmoker    -Family history is positive for coronary artery disease    -allergic to penicillin.  =========  Plan  =============  EKG showed atrial fibrillation with moderate ventricular response.  Anticoagulation status was reviewed.  INR today 1.7.  Continue Coumadin with adjusted dosage.  Interrogation of the ICD reveal excellent pacing parameters however fast ventricular response was noted.  Increase Cardizem CD to 240 mg a day and continue Coreg 25 mg p.o. twice daily.  Medications  were reviewed and updated.  Followup in the office in 6 months  With ICD interrogation.  Patient to take Lasix 40 mg a day and potassium 10 mg a day for lower extremity edema which could be partly due to Cardizem.  Further plan will depend on patient's progress.  ]]]]]]]]]]]]]]]]]]         Diagnosis Plan   1. Essential hypertension  ECG 12 Lead   2. Mixed hyperlipidemia  ECG 12 Lead   3. Ischemic cardiomyopathy     4. Chronic atrial fibrillation  ECG 12 Lead   5. Implantable cardioverter-defibrillator (ICD) in situ  ECG 12 Lead   6. Long term (current) use of anticoagulants [Z79.01]  ECG 12 Lead   LAB RESULTS (LAST 7 DAYS)    CBC        BMP        CMP         BNP        TROPONIN        CoAg  Results from last 7 days   Lab Units 10/15/19  1520   INR  1.70*       Creatinine Clearance  Estimated Creatinine Clearance: 137.8 mL/min (by C-G formula based on SCr of 1 mg/dL).    ABG        Radiology  No radiology results for the last day                The following portions of the patient's history were reviewed and updated as appropriate: allergies, current medications, past family history, past medical history, past social history, past surgical history and problem list.    Review of Systems   Constitution: Negative for malaise/fatigue.   Cardiovascular: Positive for leg swelling. Negative for chest pain, palpitations and syncope.   Respiratory: Negative for shortness of breath.    Skin: Negative for rash.   Gastrointestinal: Negative for nausea and vomiting.   Neurological: Negative for dizziness, light-headedness and numbness.         Current Outpatient Medications:   •  atorvastatin (LIPITOR) 40 MG tablet, Take 40 mg by mouth Daily., Disp: , Rfl:   •  carvedilol (COREG) 25 MG tablet, Take 1 tablet by mouth 2 (Two) Times a Day., Disp: 180 tablet, Rfl: 1  •  cetirizine (zyrTEC) 10 MG tablet, Take 10 mg by mouth Daily., Disp: , Rfl:   •  diltiazem LA (MATZIM LA) 180 MG 24 hr tablet, Take 1 tablet by mouth Daily., Disp:  90 tablet, Rfl: 3  •  glucose blood test strip, 1 each by Other route As Needed (freestyle lite test strips... use to check blood sugar three to four times daily dx:e10.65). Use as instructed, Disp: , Rfl:   •  HUMALOG KWIKPEN 100 UNIT/ML solution pen-injector, INJECT 1 UNIT:10 G CARB WITH MEALS 3X/DAY, AND 1 UNIT:15 G CARB WITH SNACKS, MAX OF 70 UNITS/DAY., Disp: , Rfl: 1  •  Insulin Glargine (BASAGLAR KWIKPEN SC), 32 units @ 3 p, Disp: , Rfl:   •  losartan (COZAAR) 25 MG tablet, Take 25 mg by mouth Daily., Disp: , Rfl:   •  Omega-3 Fatty Acids (FISH OIL) 1000 MG capsule capsule, Take  by mouth Daily With Breakfast., Disp: , Rfl:   •  potassium chloride (MICRO-K) 10 MEQ CR capsule, One tab 2x/d, Disp: 60 capsule, Rfl: 6  •  warfarin (COUMADIN) 5 MG tablet, TAKE 1 AND A HALF TABS BY MOUTH SUN TUES THURS AND 2 TABLETS ALL OTHER DAYS OR AS DIRECTED, Disp: 180 tablet, Rfl: 0    Allergies   Allergen Reactions   • Penicillin V Potassium Unknown (See Comments)   • Penicillins Rash       Family History   Problem Relation Age of Onset   • Alcohol abuse Maternal Aunt    • No Known Problems Mother    • Heart disease Father    • Atrial fibrillation Father    • No Known Problems Maternal Grandmother    • No Known Problems Maternal Grandfather    • No Known Problems Paternal Grandmother    • No Known Problems Paternal Grandfather        Past Surgical History:   Procedure Laterality Date   • CARDIAC CATHETERIZATION N/A 3/15/2018    Procedure: Left Heart Cath and right heart cath;  Surgeon: Valentin Moya MD;  Location: [facility] INVASIVE LOCATION;  Service: Cardiovascular   • CARDIAC ELECTROPHYSIOLOGY PROCEDURE N/A 3/16/2018    Procedure: Device Implant-[place]    ICD;  Surgeon: Robert Jauregui MD;  Location: [facility] INVASIVE LOCATION;  Service: Cardiovascular   • CARDIAC ELECTROPHYSIOLOGY PROCEDURE N/A 5/17/2018    Procedure: NIPS  Threshold test of his ICD-[place]  Needs to be scheduled once he has had therapeutic INR's for  3-4 weeks in a row.;  Surgeon: Robert Jauregui MD;  Location: [facility] CATH INVASIVE LOCATION;  Service: Cardiology   • CARDIAC ELECTROPHYSIOLOGY PROCEDURE N/A 2018    Procedure: Cardioversion;  Surgeon: Robert Jauregui MD;  Location: [facility] CATH INVASIVE LOCATION;  Service: Cardiology   • COLONOSCOPY     • INSERT / REPLACE / REMOVE PACEMAKER      ICD [place] PLACED 3/16/18   • KNEE ARTHROSCOPY Bilateral        Past Medical History:   Diagnosis Date   • Acute respiratory failure (CMS/HCC)    • Arrhythmia    • Atrial fibrillation (CMS/HCC)    • Cardiac arrest with ventricular fibrillation (CMS/HCC)    • Cardiomyopathy (CMS/HCC)    • Diabetes mellitus (CMS/HCC)    • Diabetes mellitus type I (CMS/HCC)    • Hyperlipidemia    • Hypertension    • Peripheral neuropathy    • Pneumonia    • Sleep apnea    • Substance abuse (CMS/HCC)        Family History   Problem Relation Age of Onset   • Alcohol abuse Maternal Aunt    • No Known Problems Mother    • Heart disease Father    • Atrial fibrillation Father    • No Known Problems Maternal Grandmother    • No Known Problems Maternal Grandfather    • No Known Problems Paternal Grandmother    • No Known Problems Paternal Grandfather        Social History     Socioeconomic History   • Marital status: Single     Spouse name: Not on file   • Number of children: Not on file   • Years of education: Not on file   • Highest education level: Not on file   Tobacco Use   • Smoking status: Former Smoker     Years: 4.00     Types: Cigarettes     Last attempt to quit:      Years since quittin.8   • Smokeless tobacco: Never Used   Substance and Sexual Activity   • Alcohol use: Yes     Alcohol/week: 1.2 oz     Types: 2 Cans of beer per week     Comment: NO DRINKING SINCE 3/2018, HX DRINKING  ETOH   • Drug use: Yes     Types: Cocaine     Comment: LAST USED IN 2018   • Sexual activity: Defer           ECG 12 Lead  Date/Time: 10/15/2019 4:57 PM  Performed by: Iliana Rodrigez  "MD  Authorized by: Iliana Rodrigez MD   Comparison: compared with previous ECG   Comments: Atrial fibrillation with moderate ventricular response 89/min nonspecific ST-T wave changes axis normal intervals no significant change from 3/15/2019              Objective:       Physical Exam    /91   Pulse 86   Ht 198.1 cm (78\")   Wt (!) 147 kg (323 lb)   BMI 37.33 kg/m²   The patient is alert, oriented and in no distress.    Vital signs as noted above.    Head and neck revealed no carotid bruits or jugular venous distension.  No thyromegaly or lymphadenopathy is present.    Lungs clear.  No wheezing.  Breath sounds are normal bilaterally.    Heart normal first and second heart sounds.  No murmur..  No pericardial rub is present.  No gallop is present.    Abdomen soft and nontender.  No organomegaly is present.    Extremities revealed good peripheral pulses without any pedal edema.    Skin warm and dry.  ICD site looks normal.    Musculoskeletal system is grossly normal.    CNS grossly normal.        "

## 2019-11-25 RX ORDER — INSULIN GLARGINE 100 [IU]/ML
INJECTION, SOLUTION SUBCUTANEOUS
Qty: 15 ML | Refills: 3 | Status: SHIPPED | OUTPATIENT
Start: 2019-11-25 | End: 2020-03-30

## 2020-01-08 RX ORDER — CARVEDILOL 25 MG/1
TABLET ORAL
Qty: 180 TABLET | Refills: 1 | Status: SHIPPED | OUTPATIENT
Start: 2020-01-08 | End: 2020-05-22 | Stop reason: SDUPTHER

## 2020-01-13 RX ORDER — WARFARIN SODIUM 5 MG/1
TABLET ORAL
Qty: 180 TABLET | Refills: 0 | Status: SHIPPED | OUTPATIENT
Start: 2020-01-13 | End: 2020-05-04

## 2020-01-22 ENCOUNTER — CLINICAL SUPPORT NO REQUIREMENTS (OUTPATIENT)
Dept: CARDIOLOGY | Facility: CLINIC | Age: 55
End: 2020-01-22

## 2020-01-22 ENCOUNTER — TELEPHONE (OUTPATIENT)
Dept: CARDIOLOGY | Facility: CLINIC | Age: 55
End: 2020-01-22

## 2020-01-22 DIAGNOSIS — I25.5 ISCHEMIC CARDIOMYOPATHY: Primary | ICD-10-CM

## 2020-01-22 DIAGNOSIS — Z95.810 PRESENCE OF AUTOMATIC CARDIOVERTER/DEFIBRILLATOR (AICD): ICD-10-CM

## 2020-01-22 PROCEDURE — 93295 DEV INTERROG REMOTE 1/2/MLT: CPT | Performed by: INTERNAL MEDICINE

## 2020-01-22 PROCEDURE — 93296 REM INTERROG EVL PM/IDS: CPT | Performed by: INTERNAL MEDICINE

## 2020-01-22 NOTE — TELEPHONE ENCOUNTER
msg left for pt at mobile number - needs inr checked;  Call office for appt or lab order. last drawn 10/15/19

## 2020-01-27 ENCOUNTER — TELEPHONE (OUTPATIENT)
Dept: CARDIOLOGY | Facility: CLINIC | Age: 55
End: 2020-01-27

## 2020-01-27 ENCOUNTER — CLINICAL SUPPORT NO REQUIREMENTS (OUTPATIENT)
Dept: CARDIOLOGY | Facility: CLINIC | Age: 55
End: 2020-01-27

## 2020-01-27 DIAGNOSIS — Z95.810 PRESENCE OF AUTOMATIC CARDIOVERTER/DEFIBRILLATOR (AICD): ICD-10-CM

## 2020-01-27 DIAGNOSIS — I25.5 ISCHEMIC CARDIOMYOPATHY: Primary | ICD-10-CM

## 2020-01-28 ENCOUNTER — ANTICOAGULATION VISIT (OUTPATIENT)
Dept: CARDIOLOGY | Facility: CLINIC | Age: 55
End: 2020-01-28

## 2020-01-28 ENCOUNTER — CLINICAL SUPPORT NO REQUIREMENTS (OUTPATIENT)
Dept: CARDIOLOGY | Facility: CLINIC | Age: 55
End: 2020-01-28

## 2020-01-28 ENCOUNTER — OFFICE VISIT (OUTPATIENT)
Dept: CARDIOLOGY | Facility: CLINIC | Age: 55
End: 2020-01-28

## 2020-01-28 VITALS
HEART RATE: 80 BPM | BODY MASS INDEX: 34.19 KG/M2 | DIASTOLIC BLOOD PRESSURE: 80 MMHG | SYSTOLIC BLOOD PRESSURE: 130 MMHG | WEIGHT: 295.5 LBS | HEIGHT: 78 IN

## 2020-01-28 VITALS
BODY MASS INDEX: 34.15 KG/M2 | SYSTOLIC BLOOD PRESSURE: 130 MMHG | DIASTOLIC BLOOD PRESSURE: 80 MMHG | WEIGHT: 295.5 LBS | HEART RATE: 80 BPM

## 2020-01-28 DIAGNOSIS — I42.0 DILATED CARDIOMYOPATHY (HCC): ICD-10-CM

## 2020-01-28 DIAGNOSIS — I46.9 CARDIAC ARREST WITH VENTRICULAR FIBRILLATION (HCC): ICD-10-CM

## 2020-01-28 DIAGNOSIS — I48.20 CHRONIC ATRIAL FIBRILLATION (HCC): ICD-10-CM

## 2020-01-28 DIAGNOSIS — I49.01 CARDIAC ARREST WITH VENTRICULAR FIBRILLATION (HCC): Primary | ICD-10-CM

## 2020-01-28 DIAGNOSIS — I49.01 CARDIAC ARREST WITH VENTRICULAR FIBRILLATION (HCC): ICD-10-CM

## 2020-01-28 DIAGNOSIS — Z79.01 LONG TERM (CURRENT) USE OF ANTICOAGULANTS: ICD-10-CM

## 2020-01-28 DIAGNOSIS — I10 ESSENTIAL HYPERTENSION: ICD-10-CM

## 2020-01-28 DIAGNOSIS — Z95.810 IMPLANTABLE CARDIOVERTER-DEFIBRILLATOR (ICD) IN SITU: ICD-10-CM

## 2020-01-28 DIAGNOSIS — I25.5 ISCHEMIC CARDIOMYOPATHY: ICD-10-CM

## 2020-01-28 DIAGNOSIS — Z95.810 PRESENCE OF BIVENTRICULAR IMPLANTABLE CARDIOVERTER-DEFIBRILLATOR (ICD): ICD-10-CM

## 2020-01-28 DIAGNOSIS — R00.2 PALPITATIONS: Primary | ICD-10-CM

## 2020-01-28 DIAGNOSIS — I46.9 CARDIAC ARREST WITH VENTRICULAR FIBRILLATION (HCC): Primary | ICD-10-CM

## 2020-01-28 DIAGNOSIS — E78.2 MIXED HYPERLIPIDEMIA: ICD-10-CM

## 2020-01-28 LAB — INR PPP: 4 (ref 0.9–1.1)

## 2020-01-28 PROCEDURE — 85610 PROTHROMBIN TIME: CPT | Performed by: INTERNAL MEDICINE

## 2020-01-28 PROCEDURE — 36416 COLLJ CAPILLARY BLOOD SPEC: CPT | Performed by: INTERNAL MEDICINE

## 2020-01-28 PROCEDURE — 99214 OFFICE O/P EST MOD 30 MIN: CPT | Performed by: INTERNAL MEDICINE

## 2020-01-28 RX ORDER — DILTIAZEM HYDROCHLORIDE 180 MG/1
180 CAPSULE, EXTENDED RELEASE ORAL DAILY
COMMUNITY
Start: 2019-12-18 | End: 2020-12-22 | Stop reason: SDUPTHER

## 2020-01-28 RX ORDER — PANTOPRAZOLE SODIUM 40 MG/1
40 TABLET, DELAYED RELEASE ORAL DAILY
COMMUNITY
Start: 2019-12-18 | End: 2021-03-04

## 2020-01-28 RX ORDER — FUROSEMIDE 40 MG/1
40 TABLET ORAL DAILY
COMMUNITY
Start: 2020-01-10 | End: 2020-11-06 | Stop reason: SDUPTHER

## 2020-01-28 NOTE — PROGRESS NOTES
Encounter Date:01/28/2020  10/15/2019      Patient ID: Shannan Arguelles is a 54 y.o. male.    Chief Complaint:  ICD shock-new problem  Cardiomyopathy  Status post ICD  Chronic atrial fibrillation  Anticoagulation management      History of Present Illness  Patient had an episode of ICD shock while he was having intimate relationship.  Patient did not have any chest discomfort dizziness or lightheadedness.  He felt flashing in the eyes.    Since I have last seen, the patient has been without any chest discomfort ,shortness of breath, palpitations, dizziness or syncope.  Denies having any headache ,abdominal pain ,nausea, vomiting , diarrhea constipation, loss of weight or loss of appetite.  Denies having any excessive bruising ,hematuria or blood in the stool.    Review of all systems negative except as indicated    Assessment and Plan       ]]]]]]]]]]]]]]]]]]]   impression  ==========  -   Status postcardiac arrest with ventricular fibrillation probably related to cocaine and cardiomyopathy March of 2018     -status post single-chamber ICD ([employer] ) March 2018 by Dr. Jauregui at [facility] .  VVI at 40 per minute.  Status post ICD shock x3 March 2019 due to atrial fibrillation with a rapid ventricular response.  Status post ICD shock due to atrial fibrillation with rapid ventricular response 1/24/2020     -Nonischemic cardiomyopathy probably secondary to cocaine use  Cardiac catheterization 03/15/2018 -nonobstructive disease - Dr. Moya at [facility]     -status post ICD 03/16/2018     - chronic atrial fibrillation on Coumadin.     -hypertension dyslipidemia diabetes peripheral neuropathy     -status post arthroscopic knee surgery     -nonsmoker     -Family history is positive for coronary artery disease     -allergic to penicillin.  =========  Plan  =============  Patient recently had ICD shock.  Interrogation of the ICD reveal excellent pacing parameters.  Patient appears to have  had atrial fibrillation with rapid ventricle response while he was having intimate relationship.  Anticoagulation status was reviewed.  INR today 4.1  Adjust Coumadin dosage.  Continue Cardizem CD to 240 mg a day and continue Coreg 25 mg p.o. twice daily.  Patient to have comprehensive metabolic panel magnesium level TSH and CBC.  Follow-up in the office in 2 weeks  May need additional rate controlling medications possibly Lanoxin if patient has any further episodes.  Medications were reviewed and updated.  Further plan will depend on patient's progress.  ]]]]]]]]]]]]]]]]]]            Diagnosis Plan   1. Palpitations  CBC & Differential    Comprehensive Metabolic Panel    Magnesium    TSH   2. Presence of biventricular implantable cardioverter-defibrillator (ICD)  CBC & Differential    Comprehensive Metabolic Panel    Magnesium    TSH   3. Essential hypertension     4. Mixed hyperlipidemia     5. Chronic atrial fibrillation     6. Cardiac arrest with ventricular fibrillation (CMS/HCC)     7. Implantable cardioverter-defibrillator (ICD) in situ     8. Dilated cardiomyopathy (CMS/HCC)     LAB RESULTS (LAST 7 DAYS)    CBC        BMP        CMP         BNP        TROPONIN        CoAg  Results from last 7 days   Lab Units 01/28/20  1425   INR  4.00*       Creatinine Clearance  CrCl cannot be calculated (Patient's most recent lab result is older than the maximum 30 days allowed.).    ABG        Radiology  No radiology results for the last day                The following portions of the patient's history were reviewed and updated as appropriate: allergies, current medications, past family history, past medical history, past social history, past surgical history and problem list.    Review of Systems   Constitution: Negative for malaise/fatigue.   Cardiovascular: Positive for leg swelling (ankles). Negative for chest pain, palpitations and syncope.   Respiratory: Negative for shortness of breath.    Skin: Negative for  rash.   Gastrointestinal: Negative for nausea and vomiting.   Neurological: Negative for dizziness, light-headedness and numbness.         Current Outpatient Medications:   •  atorvastatin (LIPITOR) 40 MG tablet, Take 40 mg by mouth Daily., Disp: , Rfl:   •  carvedilol (COREG) 25 MG tablet, TAKE 1 TABLET BY MOUTH TWICE A DAY, Disp: 180 tablet, Rfl: 1  •  cetirizine (zyrTEC) 10 MG tablet, Take 10 mg by mouth Daily., Disp: , Rfl:   •  dilTIAZem (TIAZAC) 180 MG 24 hr capsule, Take 180 mg by mouth Daily., Disp: , Rfl:   •  diltiazem LA (MATZIM LA) 180 MG 24 hr tablet, Take 1 tablet by mouth Daily., Disp: 90 tablet, Rfl: 3  •  furosemide (LASIX) 40 MG tablet, , Disp: , Rfl:   •  glucose blood test strip, 1 each by Other route As Needed (freestyle lite test strips... use to check blood sugar three to four times daily dx:e10.65). Use as instructed, Disp: , Rfl:   •  HUMALOG KWIKPEN 100 UNIT/ML solution pen-injector, INJECT 1 UNIT:10 G CARB WITH MEALS 3X/DAY, AND 1 UNIT:15 G CARB WITH SNACKS, MAX OF 70 UNITS/DAY., Disp: , Rfl: 1  •  Insulin Glargine (BASAGLAR KWIKPEN SC), 32 units @ 3 p, Disp: , Rfl:   •  Insulin Glargine (BASAGLAR KWIKPEN) 100 UNIT/ML injection pen, Inject 32 units @ 3 pm dx:e10.65, Disp: 15 mL, Rfl: 3  •  losartan (COZAAR) 25 MG tablet, Take 25 mg by mouth Daily., Disp: , Rfl:   •  Omega-3 Fatty Acids (FISH OIL) 1000 MG capsule capsule, Take  by mouth Daily With Breakfast., Disp: , Rfl:   •  pantoprazole (PROTONIX) 40 MG EC tablet, Take 40 mg by mouth Daily., Disp: , Rfl:   •  potassium chloride (MICRO-K) 10 MEQ CR capsule, One tab 2x/d, Disp: 60 capsule, Rfl: 6  •  warfarin (COUMADIN) 5 MG tablet, TAKE 1 AND A HALF TABS BY MOUTH SUN TUES THURS AND 2 TABLETS ALL OTHER DAYS OR AS DIRECTED, Disp: 180 tablet, Rfl: 0    Allergies   Allergen Reactions   • Penicillin V Potassium Unknown (See Comments)   • Penicillins Rash       Family History   Problem Relation Age of Onset   • Alcohol abuse Maternal Aunt    •  No Known Problems Mother    • Heart disease Father    • Atrial fibrillation Father    • No Known Problems Maternal Grandmother    • No Known Problems Maternal Grandfather    • No Known Problems Paternal Grandmother    • No Known Problems Paternal Grandfather        Past Surgical History:   Procedure Laterality Date   • CARDIAC CATHETERIZATION N/A 3/15/2018    Procedure: Left Heart Cath and right heart cath;  Surgeon: Valentin Moya MD;  Location:  INVASIVE LOCATION;  Service: Cardiovascular   • CARDIAC ELECTROPHYSIOLOGY PROCEDURE N/A 3/16/2018    Procedure: Device Implant-[place]    ICD;  Surgeon: Robert Jauregui MD;  Location: [facility] CATH INVASIVE LOCATION;  Service: Cardiovascular   • CARDIAC ELECTROPHYSIOLOGY PROCEDURE N/A 5/17/2018    Procedure: NIPS  Threshold test of his ICD-[place]  Needs to be scheduled once he has had therapeutic INR's for 3-4 weeks in a row.;  Surgeon: Robert Jauregui MD;  Location: [facility] CATH INVASIVE LOCATION;  Service: Cardiology   • CARDIAC ELECTROPHYSIOLOGY PROCEDURE N/A 5/17/2018    Procedure: Cardioversion;  Surgeon: Robert Jauregui MD;  Location:  INVASIVE LOCATION;  Service: Cardiology   • COLONOSCOPY     • INSERT / REPLACE / REMOVE PACEMAKER      ICD [place] PLACED 3/16/18   • KNEE ARTHROSCOPY Bilateral        Past Medical History:   Diagnosis Date   • Acute respiratory failure (CMS/HCC)    • Arrhythmia    • Atrial fibrillation (CMS/HCC)    • Cardiac arrest with ventricular fibrillation (CMS/HCC)    • Cardiomyopathy (CMS/HCC)    • Diabetes mellitus (CMS/HCC)    • Diabetes mellitus type I (CMS/HCC)    • Hyperlipidemia    • Hypertension    • Peripheral neuropathy    • Pneumonia    • Sleep apnea    • Substance abuse (CMS/HCC)        Family History   Problem Relation Age of Onset   • Alcohol abuse Maternal Aunt    • No Known Problems Mother    • Heart disease Father    • Atrial fibrillation Father    • No Known Problems Maternal Grandmother    • No Known Problems  "Maternal Grandfather    • No Known Problems Paternal Grandmother    • No Known Problems Paternal Grandfather        Social History     Socioeconomic History   • Marital status: Single     Spouse name: Not on file   • Number of children: Not on file   • Years of education: Not on file   • Highest education level: Not on file   Tobacco Use   • Smoking status: Former Smoker     Years: 4.00     Types: Cigarettes     Last attempt to quit:      Years since quittin.0   • Smokeless tobacco: Never Used   Substance and Sexual Activity   • Alcohol use: Yes     Alcohol/week: 2.0 standard drinks     Types: 2 Cans of beer per week     Comment: NO DRINKING SINCE 3/2018, HX DRINKING  ETOH   • Drug use: Yes     Types: Cocaine     Comment: LAST USED IN 2018   • Sexual activity: Defer         Procedures      Objective:       Physical Exam    /80   Pulse 80   Ht 198.1 cm (78\")   Wt 134 kg (295 lb 8 oz)   BMI 34.15 kg/m²   The patient is alert, oriented and in no distress.    Vital signs as noted above.    Head and neck revealed no carotid bruits or jugular venous distension.  No thyromegaly or lymphadenopathy is present.    Lungs clear.  No wheezing.  Breath sounds are normal bilaterally.    Heart normal first and second heart sounds.  No murmur..  No pericardial rub is present.  No gallop is present.    Abdomen soft and nontender.  No organomegaly is present.    Extremities revealed good peripheral pulses without any pedal edema.    Skin warm and dry.  ICD site looks normal.    Musculoskeletal system is grossly normal.    CNS grossly normal.        "

## 2020-02-17 ENCOUNTER — LAB (OUTPATIENT)
Dept: LAB | Facility: HOSPITAL | Age: 55
End: 2020-02-17

## 2020-02-17 DIAGNOSIS — R00.2 PALPITATIONS: ICD-10-CM

## 2020-02-17 DIAGNOSIS — Z95.810 PRESENCE OF BIVENTRICULAR IMPLANTABLE CARDIOVERTER-DEFIBRILLATOR (ICD): ICD-10-CM

## 2020-02-17 PROCEDURE — 85025 COMPLETE CBC W/AUTO DIFF WBC: CPT

## 2020-02-17 PROCEDURE — 36415 COLL VENOUS BLD VENIPUNCTURE: CPT

## 2020-02-18 LAB
BASOPHILS # BLD AUTO: 0.06 10*3/MM3 (ref 0–0.2)
BASOPHILS NFR BLD AUTO: 0.8 % (ref 0–1.5)
DEPRECATED RDW RBC AUTO: 44.9 FL (ref 37–54)
EOSINOPHIL # BLD AUTO: 0.26 10*3/MM3 (ref 0–0.4)
EOSINOPHIL NFR BLD AUTO: 3.5 % (ref 0.3–6.2)
ERYTHROCYTE [DISTWIDTH] IN BLOOD BY AUTOMATED COUNT: 12.7 % (ref 12.3–15.4)
HCT VFR BLD AUTO: 41.2 % (ref 37.5–51)
HGB BLD-MCNC: 13.7 G/DL (ref 13–17.7)
IMM GRANULOCYTES # BLD AUTO: 0.01 10*3/MM3 (ref 0–0.05)
IMM GRANULOCYTES NFR BLD AUTO: 0.1 % (ref 0–0.5)
LYMPHOCYTES # BLD AUTO: 2.22 10*3/MM3 (ref 0.7–3.1)
LYMPHOCYTES NFR BLD AUTO: 29.5 % (ref 19.6–45.3)
MCH RBC QN AUTO: 32.4 PG (ref 26.6–33)
MCHC RBC AUTO-ENTMCNC: 33.3 G/DL (ref 31.5–35.7)
MCV RBC AUTO: 97.4 FL (ref 79–97)
MONOCYTES # BLD AUTO: 0.85 10*3/MM3 (ref 0.1–0.9)
MONOCYTES NFR BLD AUTO: 11.3 % (ref 5–12)
NEUTROPHILS # BLD AUTO: 4.12 10*3/MM3 (ref 1.7–7)
NEUTROPHILS NFR BLD AUTO: 54.8 % (ref 42.7–76)
NRBC BLD AUTO-RTO: 0 /100 WBC (ref 0–0.2)
PLATELET # BLD AUTO: 243 10*3/MM3 (ref 140–450)
PMV BLD AUTO: 9.9 FL (ref 6–12)
RBC # BLD AUTO: 4.23 10*6/MM3 (ref 4.14–5.8)
WBC NRBC COR # BLD: 7.52 10*3/MM3 (ref 3.4–10.8)

## 2020-03-02 ENCOUNTER — TELEPHONE (OUTPATIENT)
Dept: CARDIOLOGY | Facility: CLINIC | Age: 55
End: 2020-03-02

## 2020-03-12 ENCOUNTER — TELEPHONE (OUTPATIENT)
Dept: CARDIOLOGY | Facility: CLINIC | Age: 55
End: 2020-03-12

## 2020-03-16 DIAGNOSIS — E10.49 TYPE 1 DIABETES MELLITUS WITH OTHER DIABETIC NEUROLOGICAL COMPLICATION (HCC): Primary | ICD-10-CM

## 2020-03-16 RX ORDER — INSULIN LISPRO 100 [IU]/ML
INJECTION, SOLUTION INTRAVENOUS; SUBCUTANEOUS
Qty: 75 ML | Refills: 2 | Status: SHIPPED | OUTPATIENT
Start: 2020-03-16 | End: 2021-03-18

## 2020-03-18 ENCOUNTER — ANTICOAGULATION VISIT (OUTPATIENT)
Dept: CARDIOLOGY | Facility: CLINIC | Age: 55
End: 2020-03-18

## 2020-03-18 VITALS — BODY MASS INDEX: 33.74 KG/M2 | WEIGHT: 292 LBS | SYSTOLIC BLOOD PRESSURE: 150 MMHG | DIASTOLIC BLOOD PRESSURE: 90 MMHG

## 2020-03-18 DIAGNOSIS — I48.20 CHRONIC ATRIAL FIBRILLATION (HCC): ICD-10-CM

## 2020-03-18 DIAGNOSIS — I46.9 CARDIAC ARREST WITH VENTRICULAR FIBRILLATION (HCC): ICD-10-CM

## 2020-03-18 DIAGNOSIS — I49.01 CARDIAC ARREST WITH VENTRICULAR FIBRILLATION (HCC): ICD-10-CM

## 2020-03-18 DIAGNOSIS — Z79.01 LONG TERM (CURRENT) USE OF ANTICOAGULANTS: ICD-10-CM

## 2020-03-18 LAB — INR PPP: 2.2 (ref 0.9–1.1)

## 2020-03-18 PROCEDURE — 36416 COLLJ CAPILLARY BLOOD SPEC: CPT | Performed by: INTERNAL MEDICINE

## 2020-03-18 PROCEDURE — 85610 PROTHROMBIN TIME: CPT | Performed by: INTERNAL MEDICINE

## 2020-03-25 DIAGNOSIS — E10.65 TYPE 1 DIABETES MELLITUS WITH HYPERGLYCEMIA (HCC): Primary | ICD-10-CM

## 2020-03-25 RX ORDER — BLOOD-GLUCOSE METER
KIT MISCELLANEOUS
Qty: 150 EACH | Refills: 4 | Status: SHIPPED | OUTPATIENT
Start: 2020-03-25 | End: 2021-03-04

## 2020-03-30 RX ORDER — INSULIN GLARGINE 100 [IU]/ML
INJECTION, SOLUTION SUBCUTANEOUS
Qty: 30 ML | Refills: 1 | Status: SHIPPED | OUTPATIENT
Start: 2020-03-30 | End: 2020-08-24

## 2020-04-08 ENCOUNTER — LAB (OUTPATIENT)
Dept: LAB | Facility: HOSPITAL | Age: 55
End: 2020-04-08

## 2020-04-08 DIAGNOSIS — E78.5 HYPERLIPIDEMIA, UNSPECIFIED HYPERLIPIDEMIA TYPE: ICD-10-CM

## 2020-04-08 DIAGNOSIS — E10.49 TYPE 1 DIABETES MELLITUS WITH OTHER DIABETIC NEUROLOGICAL COMPLICATION (HCC): ICD-10-CM

## 2020-04-08 DIAGNOSIS — R94.6 ABNORMAL THYROID FUNCTION TEST: ICD-10-CM

## 2020-04-08 DIAGNOSIS — E55.9 VITAMIN D DEFICIENCY: ICD-10-CM

## 2020-04-08 LAB
25(OH)D3 SERPL-MCNC: 24.4 NG/ML (ref 30–100)
ALBUMIN SERPL-MCNC: 4.4 G/DL (ref 3.5–5.2)
ALBUMIN UR-MCNC: <1.2 MG/DL
ALBUMIN/GLOB SERPL: 1.9 G/DL
ALP SERPL-CCNC: 97 U/L (ref 39–117)
ALT SERPL W P-5'-P-CCNC: 26 U/L (ref 1–41)
ANION GAP SERPL CALCULATED.3IONS-SCNC: 11.5 MMOL/L (ref 5–15)
AST SERPL-CCNC: 22 U/L (ref 1–40)
BILIRUB SERPL-MCNC: 0.7 MG/DL (ref 0.2–1.2)
BUN BLD-MCNC: 19 MG/DL (ref 6–20)
BUN/CREAT SERPL: 20.2 (ref 7–25)
CALCIUM SPEC-SCNC: 9 MG/DL (ref 8.6–10.5)
CHLORIDE SERPL-SCNC: 97 MMOL/L (ref 98–107)
CHOLEST SERPL-MCNC: 113 MG/DL (ref 0–200)
CO2 SERPL-SCNC: 28.5 MMOL/L (ref 22–29)
CREAT BLD-MCNC: 0.94 MG/DL (ref 0.76–1.27)
CREAT UR-MCNC: 243.9 MG/DL
GFR SERPL CREATININE-BSD FRML MDRD: 84 ML/MIN/1.73
GLOBULIN UR ELPH-MCNC: 2.3 GM/DL
GLUCOSE BLD-MCNC: 155 MG/DL (ref 65–99)
HBA1C MFR BLD: 9.5 % (ref 3.5–5.6)
HDLC SERPL-MCNC: 57 MG/DL (ref 40–60)
LDLC SERPL CALC-MCNC: 47 MG/DL (ref 0–100)
LDLC/HDLC SERPL: 0.83 {RATIO}
MICROALBUMIN/CREAT UR: NORMAL MG/G{CREAT}
POTASSIUM BLD-SCNC: 3.7 MMOL/L (ref 3.5–5.2)
PROT SERPL-MCNC: 6.7 G/DL (ref 6–8.5)
SODIUM BLD-SCNC: 137 MMOL/L (ref 136–145)
TRIGL SERPL-MCNC: 44 MG/DL (ref 0–150)
TSH SERPL DL<=0.05 MIU/L-ACNC: 0.01 UIU/ML (ref 0.27–4.2)
VLDLC SERPL-MCNC: 8.8 MG/DL (ref 5–40)

## 2020-04-08 PROCEDURE — 83036 HEMOGLOBIN GLYCOSYLATED A1C: CPT

## 2020-04-08 PROCEDURE — 84443 ASSAY THYROID STIM HORMONE: CPT

## 2020-04-08 PROCEDURE — 36415 COLL VENOUS BLD VENIPUNCTURE: CPT

## 2020-04-08 PROCEDURE — 82043 UR ALBUMIN QUANTITATIVE: CPT

## 2020-04-08 PROCEDURE — 80061 LIPID PANEL: CPT

## 2020-04-08 PROCEDURE — 80053 COMPREHEN METABOLIC PANEL: CPT

## 2020-04-08 PROCEDURE — 82306 VITAMIN D 25 HYDROXY: CPT

## 2020-04-08 PROCEDURE — 84481 FREE ASSAY (FT-3): CPT

## 2020-04-08 PROCEDURE — 82570 ASSAY OF URINE CREATININE: CPT

## 2020-04-08 PROCEDURE — 84439 ASSAY OF FREE THYROXINE: CPT | Performed by: INTERNAL MEDICINE

## 2020-04-10 DIAGNOSIS — R94.6 ABNORMAL THYROID FUNCTION TEST: Primary | ICD-10-CM

## 2020-04-10 LAB
T3FREE SERPL-MCNC: 7.13 PG/ML (ref 2–4.4)
T4 FREE SERPL-MCNC: 2.75 NG/DL (ref 0.93–1.7)

## 2020-04-15 ENCOUNTER — TELEMEDICINE (OUTPATIENT)
Dept: ENDOCRINOLOGY | Facility: CLINIC | Age: 55
End: 2020-04-15

## 2020-04-15 VITALS — HEART RATE: 92 BPM | HEIGHT: 78 IN | TEMPERATURE: 98 F | BODY MASS INDEX: 34.13 KG/M2 | WEIGHT: 295 LBS

## 2020-04-15 DIAGNOSIS — Z79.899 LONG TERM USE OF DRUG: ICD-10-CM

## 2020-04-15 DIAGNOSIS — E05.90 HYPERTHYROIDISM: ICD-10-CM

## 2020-04-15 DIAGNOSIS — E55.9 VITAMIN D DEFICIENCY: ICD-10-CM

## 2020-04-15 DIAGNOSIS — E10.49 TYPE 1 DIABETES MELLITUS WITH OTHER DIABETIC NEUROLOGICAL COMPLICATION (HCC): Primary | ICD-10-CM

## 2020-04-15 DIAGNOSIS — E78.2 MIXED HYPERLIPIDEMIA: ICD-10-CM

## 2020-04-15 PROCEDURE — 99213 OFFICE O/P EST LOW 20 MIN: CPT | Performed by: INTERNAL MEDICINE

## 2020-04-15 RX ORDER — METHIMAZOLE 10 MG/1
10 TABLET ORAL 2 TIMES DAILY
Qty: 60 TABLET | Refills: 1 | Status: SHIPPED | OUTPATIENT
Start: 2020-04-15 | End: 2020-05-08

## 2020-04-15 RX ORDER — ERGOCALCIFEROL 1.25 MG/1
50000 CAPSULE ORAL WEEKLY
Qty: 12 CAPSULE | Refills: 3 | Status: SHIPPED | OUTPATIENT
Start: 2020-04-15 | End: 2021-04-21

## 2020-04-15 RX ORDER — VALSARTAN 40 MG/1
40 TABLET ORAL DAILY
COMMUNITY
End: 2020-11-06 | Stop reason: SDUPTHER

## 2020-04-15 NOTE — PROGRESS NOTES
[facility]        Patient Care Team:  Lluvia Sarabia APRN as PCP - General  Josie Montesinos MD as Consulting Physician (Endocrinology)  Iliana Rodrigez MD as Consulting Physician (Cardiology)    Chief Complaint:    Chief Complaint   Patient presents with   • Diabetes     TYPE 1-  (FASTING)         Subjective   Here for diabetes f/u  This is a telemedicine visit in view of the covid 19 pandemic, using phone only due to problems with connection.  Blood sugars: in the high 100's  Exercise program: not much  Not taking vit D  Due for eye exam    Interval History:     Patient Complaints: grieving: father  last fall. Then mother  2 months later  Patient Denies: palpitations or tremors  History taken from: patient    Review of Systems:   Review of Systems   Eyes: Negative for blurred vision.   Respiratory: Negative for shortness of breath.    Cardiovascular: Negative for palpitations and leg swelling.   Gastrointestinal: Negative for nausea.   Endocrine: Negative for polyuria.   Neurological: Negative for headache.     Lost  23 lb since last visit    Objective     Vital Signs  Temp:  [98 °F (36.7 °C)] 98 °F (36.7 °C)  Heart Rate:  [92] 92    Physical Exam: not done. eVisit                                                              Results Review:    I have reviewed the patient's new clinical results, labs & imaging.    Medication Review:   Prior to Admission medications    Medication Sig Start Date End Date Taking? Authorizing Provider   atorvastatin (LIPITOR) 40 MG tablet Take 40 mg by mouth Daily.   Yes Geovanni Ramos MD   carvedilol (COREG) 25 MG tablet TAKE 1 TABLET BY MOUTH TWICE A DAY 20  Yes Iliana Rodrigez MD   cetirizine (zyrTEC) 10 MG tablet Take 10 mg by mouth As Needed.   Yes Geovanni Ramos MD   diltiazem LA (MATZIM LA) 180 MG 24 hr tablet Take 1 tablet by mouth Daily. 19  Yes Iliana Rodrigez MD   FREESTYLE LITE test strip USE TO CHECK  BLOOD SUGAR 4-5 TIMES A DAY DX E10.65 3/25/20  Yes Josie Montesinos MD   furosemide (LASIX) 40 MG tablet Take 40 mg by mouth Daily. 1/10/20  Yes Geovanni Ramos MD   HUMALOG KWIKPEN 100 UNIT/ML solution pen-injector INJECT 1 UNIT:10 G CARB WITH MEALS 3X/DAY, AND 1 UNIT:15 G CARB WITH SNACKS, MAX OF 70 UNITS/DAY 3/16/20  Yes Josie Montesinos MD   Insulin Glargine (BASAGLAR KWIKPEN) 100 UNIT/ML injection pen Inject 32 units @ 3 pm dx:e10.65 3/30/20  Yes Josie Montesinos MD   Omega-3 Fatty Acids (FISH OIL) 1000 MG capsule capsule Take  by mouth Daily With Breakfast.   Yes Geovanni Ramos MD   potassium chloride (MICRO-K) 10 MEQ CR capsule One tab 2x/d 10/2/19  Yes Josie Montesinos MD   valsartan (DIOVAN) 40 MG tablet Take 40 mg by mouth Daily.   Yes Geovanni Ramos MD   warfarin (COUMADIN) 5 MG tablet TAKE 1 AND A HALF TABS BY MOUTH SUN TUES THURS AND 2 TABLETS ALL OTHER DAYS OR AS DIRECTED 1/13/20  Yes Iliana Rodrigez MD   losartan (COZAAR) 25 MG tablet Take 25 mg by mouth Daily.  4/15/20 Yes Geovanni Ramos MD   dilTIAZem (TIAZAC) 180 MG 24 hr capsule Take 180 mg by mouth Daily. 12/18/19   Geovanni Ramos MD   methIMAzole (TAPAZOLE) 10 MG tablet Take 1 tablet by mouth 2 (Two) Times a Day. 4/15/20 4/15/21  Josie Montesinos MD   pantoprazole (PROTONIX) 40 MG EC tablet Take 40 mg by mouth Daily. 12/18/19   Geovanni Ramos MD   vitamin D (ERGOCALCIFEROL) 1.25 MG (31877 UT) capsule capsule Take 1 capsule by mouth 1 (One) Time Per Week. 4/15/20   Josie Montesinos MD   Insulin Glargine (BASAGLAR KWIKPEN SC) 32 units @ 3 p  4/15/20  Geovanni Ramos MD       Lab Results (most recent)     None            Lab Results   Component Value Date    HGBA1C 9.5 (H) 04/08/2020    HGBA1C 8.7 (H) 09/25/2019    HGBA1C 8.20 (H) 03/17/2018      Lab Results   Component Value Date    GLUCOSE 155 (H) 04/08/2020    BUN 19 04/08/2020    CREATININE 0.94 04/08/2020    EGFRIFNONA 84 04/08/2020     BCR 20.2 04/08/2020    K 3.7 04/08/2020    CO2 28.5 04/08/2020    CALCIUM 9.0 04/08/2020    ALBUMIN 4.40 04/08/2020    LABIL2 1.3 03/27/2019    AST 22 04/08/2020    ALT 26 04/08/2020    CHOL 113 04/08/2020    LDL 47 04/08/2020    HDL 57 04/08/2020    TRIG 44 04/08/2020     Lab Results   Component Value Date    TSH 0.007 (L) 04/08/2020    FREET4 2.75 (H) 04/08/2020    SIFG33ET 24.4 (L) 04/08/2020       Assessment/Plan     Shannan was seen today for diabetes.    Diagnoses and all orders for this visit:    Type 1 diabetes mellitus with other diabetic neurological complication (CMS/[facility])    Hyperthyroidism  -     TSH; Future  -     T4, Free; Future    Mixed hyperlipidemia    Vitamin D deficiency    Long term use of drug  -     CBC & Differential; Future  -     Comprehensive Metabolic Panel; Future    Other orders  -     methIMAzole (TAPAZOLE) 10 MG tablet; Take 1 tablet by mouth 2 (Two) Times a Day.  -     vitamin D (ERGOCALCIFEROL) 1.25 MG (93266 UT) capsule capsule; Take 1 capsule by mouth 1 (One) Time Per Week.        Send blood sugars.  See eye doctor.  Start vit D 50,000 one weekly.  Start methimazole 10 mg one 2 x / d.  Have labs rechecked in 1 month.  Call if blood sugars are running under 100 or over 200.        Josie Montesinos MD  04/15/20  18:15

## 2020-04-15 NOTE — PATIENT INSTRUCTIONS
Send blood sugars.  See eye doctor.  Start vit D 50,000 one weekly.  Start methimazole 10 mg one 2 x / d.  Have labs rechecked in 1 month.  Call if blood sugars are running under 100 or over 200.  F/u in 6 months, with labs prior.

## 2020-05-04 RX ORDER — WARFARIN SODIUM 5 MG/1
TABLET ORAL
Qty: 180 TABLET | Refills: 0 | Status: SHIPPED | OUTPATIENT
Start: 2020-05-04 | End: 2020-08-03

## 2020-05-05 ENCOUNTER — CLINICAL SUPPORT NO REQUIREMENTS (OUTPATIENT)
Dept: CARDIOLOGY | Facility: CLINIC | Age: 55
End: 2020-05-05

## 2020-05-05 DIAGNOSIS — I25.5 ISCHEMIC CARDIOMYOPATHY: Primary | ICD-10-CM

## 2020-05-05 DIAGNOSIS — Z95.810 PRESENCE OF AUTOMATIC CARDIOVERTER/DEFIBRILLATOR (AICD): ICD-10-CM

## 2020-05-08 RX ORDER — METHIMAZOLE 10 MG/1
TABLET ORAL
Qty: 60 TABLET | Refills: 1 | Status: SHIPPED | OUTPATIENT
Start: 2020-05-08 | End: 2020-06-12

## 2020-05-12 ENCOUNTER — LAB (OUTPATIENT)
Dept: LAB | Facility: HOSPITAL | Age: 55
End: 2020-05-12

## 2020-05-12 DIAGNOSIS — Z79.899 LONG TERM USE OF DRUG: ICD-10-CM

## 2020-05-12 DIAGNOSIS — E05.90 HYPERTHYROIDISM: ICD-10-CM

## 2020-05-12 LAB
ALBUMIN SERPL-MCNC: 3.7 G/DL (ref 3.5–5.2)
ALBUMIN/GLOB SERPL: 1.4 G/DL
ALP SERPL-CCNC: 93 U/L (ref 39–117)
ALT SERPL W P-5'-P-CCNC: 20 U/L (ref 1–41)
ANION GAP SERPL CALCULATED.3IONS-SCNC: 10.7 MMOL/L (ref 5–15)
AST SERPL-CCNC: 14 U/L (ref 1–40)
BASOPHILS # BLD AUTO: 0.05 10*3/MM3 (ref 0–0.2)
BASOPHILS NFR BLD AUTO: 0.8 % (ref 0–1.5)
BILIRUB SERPL-MCNC: 0.3 MG/DL (ref 0.2–1.2)
BUN BLD-MCNC: 16 MG/DL (ref 6–20)
BUN/CREAT SERPL: 17.4 (ref 7–25)
CALCIUM SPEC-SCNC: 9 MG/DL (ref 8.6–10.5)
CHLORIDE SERPL-SCNC: 102 MMOL/L (ref 98–107)
CO2 SERPL-SCNC: 28.3 MMOL/L (ref 22–29)
CREAT BLD-MCNC: 0.92 MG/DL (ref 0.76–1.27)
DEPRECATED RDW RBC AUTO: 47 FL (ref 37–54)
EOSINOPHIL # BLD AUTO: 0.28 10*3/MM3 (ref 0–0.4)
EOSINOPHIL NFR BLD AUTO: 4.2 % (ref 0.3–6.2)
ERYTHROCYTE [DISTWIDTH] IN BLOOD BY AUTOMATED COUNT: 13 % (ref 12.3–15.4)
GFR SERPL CREATININE-BSD FRML MDRD: 86 ML/MIN/1.73
GLOBULIN UR ELPH-MCNC: 2.7 GM/DL
GLUCOSE BLD-MCNC: 172 MG/DL (ref 65–99)
HCT VFR BLD AUTO: 38.8 % (ref 37.5–51)
HGB BLD-MCNC: 12.9 G/DL (ref 13–17.7)
IMM GRANULOCYTES # BLD AUTO: 0.02 10*3/MM3 (ref 0–0.05)
IMM GRANULOCYTES NFR BLD AUTO: 0.3 % (ref 0–0.5)
LYMPHOCYTES # BLD AUTO: 2.27 10*3/MM3 (ref 0.7–3.1)
LYMPHOCYTES NFR BLD AUTO: 34.2 % (ref 19.6–45.3)
MCH RBC QN AUTO: 33 PG (ref 26.6–33)
MCHC RBC AUTO-ENTMCNC: 33.2 G/DL (ref 31.5–35.7)
MCV RBC AUTO: 99.2 FL (ref 79–97)
MONOCYTES # BLD AUTO: 0.57 10*3/MM3 (ref 0.1–0.9)
MONOCYTES NFR BLD AUTO: 8.6 % (ref 5–12)
NEUTROPHILS # BLD AUTO: 3.45 10*3/MM3 (ref 1.7–7)
NEUTROPHILS NFR BLD AUTO: 51.9 % (ref 42.7–76)
NRBC BLD AUTO-RTO: 0 /100 WBC (ref 0–0.2)
PLATELET # BLD AUTO: 235 10*3/MM3 (ref 140–450)
PMV BLD AUTO: 10 FL (ref 6–12)
POTASSIUM BLD-SCNC: 3.6 MMOL/L (ref 3.5–5.2)
PROT SERPL-MCNC: 6.4 G/DL (ref 6–8.5)
RBC # BLD AUTO: 3.91 10*6/MM3 (ref 4.14–5.8)
SODIUM BLD-SCNC: 141 MMOL/L (ref 136–145)
T4 FREE SERPL-MCNC: 1.78 NG/DL (ref 0.93–1.7)
TSH SERPL DL<=0.05 MIU/L-ACNC: 0.01 UIU/ML (ref 0.27–4.2)
WBC NRBC COR # BLD: 6.64 10*3/MM3 (ref 3.4–10.8)

## 2020-05-12 PROCEDURE — 80053 COMPREHEN METABOLIC PANEL: CPT

## 2020-05-12 PROCEDURE — 84439 ASSAY OF FREE THYROXINE: CPT

## 2020-05-12 PROCEDURE — 85025 COMPLETE CBC W/AUTO DIFF WBC: CPT

## 2020-05-12 PROCEDURE — 84443 ASSAY THYROID STIM HORMONE: CPT

## 2020-05-12 PROCEDURE — 36415 COLL VENOUS BLD VENIPUNCTURE: CPT

## 2020-05-22 RX ORDER — CARVEDILOL 25 MG/1
25 TABLET ORAL 2 TIMES DAILY
Qty: 180 TABLET | Refills: 3 | Status: SHIPPED | OUTPATIENT
Start: 2020-05-22 | End: 2020-11-06 | Stop reason: SDUPTHER

## 2020-06-03 ENCOUNTER — TELEPHONE (OUTPATIENT)
Dept: CARDIOLOGY | Facility: CLINIC | Age: 55
End: 2020-06-03

## 2020-06-12 RX ORDER — METHIMAZOLE 10 MG/1
TABLET ORAL
Qty: 60 TABLET | Refills: 1 | Status: SHIPPED | OUTPATIENT
Start: 2020-06-12 | End: 2020-08-31

## 2020-06-18 ENCOUNTER — ANTICOAGULATION VISIT (OUTPATIENT)
Dept: CARDIOLOGY | Facility: CLINIC | Age: 55
End: 2020-06-18

## 2020-06-18 VITALS — WEIGHT: 296 LBS | SYSTOLIC BLOOD PRESSURE: 130 MMHG | DIASTOLIC BLOOD PRESSURE: 90 MMHG | BODY MASS INDEX: 34.21 KG/M2

## 2020-06-18 DIAGNOSIS — I48.20 CHRONIC ATRIAL FIBRILLATION (HCC): ICD-10-CM

## 2020-06-18 DIAGNOSIS — I46.9 CARDIAC ARREST WITH VENTRICULAR FIBRILLATION (HCC): ICD-10-CM

## 2020-06-18 DIAGNOSIS — I49.01 CARDIAC ARREST WITH VENTRICULAR FIBRILLATION (HCC): ICD-10-CM

## 2020-06-18 DIAGNOSIS — Z79.01 LONG TERM (CURRENT) USE OF ANTICOAGULANTS: ICD-10-CM

## 2020-06-18 LAB — INR PPP: 2.6 (ref 0.9–1.1)

## 2020-06-18 PROCEDURE — 85610 PROTHROMBIN TIME: CPT | Performed by: INTERNAL MEDICINE

## 2020-06-18 PROCEDURE — 36416 COLLJ CAPILLARY BLOOD SPEC: CPT | Performed by: INTERNAL MEDICINE

## 2020-07-23 ENCOUNTER — ANTICOAGULATION VISIT (OUTPATIENT)
Dept: CARDIOLOGY | Facility: CLINIC | Age: 55
End: 2020-07-23

## 2020-07-23 VITALS
HEART RATE: 101 BPM | SYSTOLIC BLOOD PRESSURE: 141 MMHG | DIASTOLIC BLOOD PRESSURE: 92 MMHG | BODY MASS INDEX: 33.63 KG/M2 | WEIGHT: 291 LBS

## 2020-07-23 DIAGNOSIS — I46.9 CARDIAC ARREST WITH VENTRICULAR FIBRILLATION (HCC): ICD-10-CM

## 2020-07-23 DIAGNOSIS — I48.20 CHRONIC ATRIAL FIBRILLATION (HCC): ICD-10-CM

## 2020-07-23 DIAGNOSIS — Z79.01 LONG TERM (CURRENT) USE OF ANTICOAGULANTS: ICD-10-CM

## 2020-07-23 DIAGNOSIS — I49.01 CARDIAC ARREST WITH VENTRICULAR FIBRILLATION (HCC): ICD-10-CM

## 2020-07-23 LAB — INR PPP: 2.1 (ref 0.9–1.1)

## 2020-07-23 PROCEDURE — 36416 COLLJ CAPILLARY BLOOD SPEC: CPT | Performed by: INTERNAL MEDICINE

## 2020-07-23 PROCEDURE — 85610 PROTHROMBIN TIME: CPT | Performed by: INTERNAL MEDICINE

## 2020-08-03 RX ORDER — WARFARIN SODIUM 5 MG/1
TABLET ORAL
Qty: 60 TABLET | Refills: 2 | Status: SHIPPED | OUTPATIENT
Start: 2020-08-03 | End: 2020-11-06 | Stop reason: SDUPTHER

## 2020-08-11 ENCOUNTER — CLINICAL SUPPORT NO REQUIREMENTS (OUTPATIENT)
Dept: CARDIOLOGY | Facility: CLINIC | Age: 55
End: 2020-08-11

## 2020-08-11 DIAGNOSIS — I49.01 CARDIAC ARREST WITH VENTRICULAR FIBRILLATION (HCC): Primary | ICD-10-CM

## 2020-08-11 DIAGNOSIS — Z95.810 PRESENCE OF BIVENTRICULAR IMPLANTABLE CARDIOVERTER-DEFIBRILLATOR (ICD): ICD-10-CM

## 2020-08-11 DIAGNOSIS — I25.5 ISCHEMIC CARDIOMYOPATHY: ICD-10-CM

## 2020-08-11 DIAGNOSIS — I46.9 CARDIAC ARREST WITH VENTRICULAR FIBRILLATION (HCC): Primary | ICD-10-CM

## 2020-08-11 PROCEDURE — 93296 REM INTERROG EVL PM/IDS: CPT | Performed by: INTERNAL MEDICINE

## 2020-08-11 PROCEDURE — 93295 DEV INTERROG REMOTE 1/2/MLT: CPT | Performed by: INTERNAL MEDICINE

## 2020-08-24 RX ORDER — INSULIN GLARGINE 100 [IU]/ML
INJECTION, SOLUTION SUBCUTANEOUS
Qty: 15 ML | Refills: 4 | Status: SHIPPED | OUTPATIENT
Start: 2020-08-24 | End: 2020-12-27

## 2020-08-31 RX ORDER — METHIMAZOLE 10 MG/1
TABLET ORAL
Qty: 60 TABLET | Refills: 3 | Status: SHIPPED | OUTPATIENT
Start: 2020-08-31 | End: 2020-12-27

## 2020-10-16 ENCOUNTER — TELEPHONE (OUTPATIENT)
Dept: CARDIOLOGY | Facility: CLINIC | Age: 55
End: 2020-10-16

## 2020-11-04 ENCOUNTER — TELEPHONE (OUTPATIENT)
Dept: CARDIOLOGY | Facility: CLINIC | Age: 55
End: 2020-11-04

## 2020-11-06 ENCOUNTER — ANTICOAGULATION VISIT (OUTPATIENT)
Dept: CARDIOLOGY | Facility: CLINIC | Age: 55
End: 2020-11-06

## 2020-11-06 ENCOUNTER — TELEPHONE (OUTPATIENT)
Dept: CARDIOLOGY | Facility: CLINIC | Age: 55
End: 2020-11-06

## 2020-11-06 VITALS — SYSTOLIC BLOOD PRESSURE: 130 MMHG | HEART RATE: 95 BPM | DIASTOLIC BLOOD PRESSURE: 90 MMHG | TEMPERATURE: 97.5 F

## 2020-11-06 DIAGNOSIS — Z79.01 LONG TERM (CURRENT) USE OF ANTICOAGULANTS: ICD-10-CM

## 2020-11-06 DIAGNOSIS — I48.20 CHRONIC ATRIAL FIBRILLATION (HCC): ICD-10-CM

## 2020-11-06 DIAGNOSIS — I48.20 CHRONIC ATRIAL FIBRILLATION (HCC): Primary | ICD-10-CM

## 2020-11-06 DIAGNOSIS — I49.01 CARDIAC ARREST WITH VENTRICULAR FIBRILLATION (HCC): ICD-10-CM

## 2020-11-06 DIAGNOSIS — I46.9 CARDIAC ARREST WITH VENTRICULAR FIBRILLATION (HCC): ICD-10-CM

## 2020-11-06 LAB — INR PPP: 2.1 (ref 0.9–1.1)

## 2020-11-06 PROCEDURE — 36416 COLLJ CAPILLARY BLOOD SPEC: CPT | Performed by: INTERNAL MEDICINE

## 2020-11-06 PROCEDURE — 85610 PROTHROMBIN TIME: CPT | Performed by: INTERNAL MEDICINE

## 2020-11-06 RX ORDER — CARVEDILOL 25 MG/1
25 TABLET ORAL 2 TIMES DAILY
Qty: 60 TABLET | Refills: 0 | Status: SHIPPED | OUTPATIENT
Start: 2020-11-06 | End: 2021-06-28 | Stop reason: SDUPTHER

## 2020-11-06 RX ORDER — WARFARIN SODIUM 5 MG/1
TABLET ORAL
Qty: 60 TABLET | Refills: 1 | Status: SHIPPED | OUTPATIENT
Start: 2020-11-06 | End: 2021-03-04

## 2020-11-06 RX ORDER — VALSARTAN 40 MG/1
40 TABLET ORAL DAILY
Qty: 30 TABLET | Refills: 0 | Status: SHIPPED | OUTPATIENT
Start: 2020-11-06 | End: 2021-01-04

## 2020-11-06 RX ORDER — FUROSEMIDE 40 MG/1
40 TABLET ORAL DAILY
Qty: 30 TABLET | Refills: 0 | Status: SHIPPED | OUTPATIENT
Start: 2020-11-06 | End: 2020-11-30

## 2020-11-06 RX ORDER — DILTIAZEM HYDROCHLORIDE EXTENDED-RELEASE TABLETS 180 MG/1
180 TABLET, EXTENDED RELEASE ORAL DAILY
Qty: 30 TABLET | Refills: 0 | Status: SHIPPED | OUTPATIENT
Start: 2020-11-06 | End: 2021-06-23

## 2020-11-06 NOTE — TELEPHONE ENCOUNTER
Patient came into office today for PT INR, spoke to Marcy MAIN, patient having severe swelling in legs, has not been seen since 1/28/2020, no showed last 2 appts in office and does not have upcoming appt scheduled.  Needing refills on all of medications. Would you like to see patient in office? Please advise, and sign off on refills.

## 2020-11-06 NOTE — TELEPHONE ENCOUNTER
Pt in with INR today.     Request Warfarin refill     Patient currently taking 10mg MWFSa and 7.5mg Sun Tue and Thurs     Please sign encounter to send RX    Rx is up to date and ready for signature

## 2020-11-30 RX ORDER — FUROSEMIDE 40 MG/1
TABLET ORAL
Qty: 30 TABLET | Refills: 5 | Status: SHIPPED | OUTPATIENT
Start: 2020-11-30 | End: 2021-03-01

## 2020-12-17 ENCOUNTER — HOSPITAL ENCOUNTER (EMERGENCY)
Facility: HOSPITAL | Age: 55
Discharge: HOME | End: 2020-12-17
Admitting: EMERGENCY MEDICINE

## 2020-12-17 ENCOUNTER — APPOINTMENT (OUTPATIENT)
Dept: CARDIOLOGY | Facility: HOSPITAL | Age: 55
End: 2020-12-17

## 2020-12-17 VITALS
DIASTOLIC BLOOD PRESSURE: 76 MMHG | OXYGEN SATURATION: 98 % | SYSTOLIC BLOOD PRESSURE: 118 MMHG | RESPIRATION RATE: 18 BRPM | TEMPERATURE: 98.6 F | HEIGHT: 78 IN | WEIGHT: 288.14 LBS | HEART RATE: 70 BPM | BODY MASS INDEX: 33.34 KG/M2

## 2020-12-17 DIAGNOSIS — M79.662 PAIN AND SWELLING OF LEFT LOWER LEG: Primary | ICD-10-CM

## 2020-12-17 DIAGNOSIS — M79.89 PAIN AND SWELLING OF LEFT LOWER LEG: Primary | ICD-10-CM

## 2020-12-17 LAB

## 2020-12-17 PROCEDURE — 99283 EMERGENCY DEPT VISIT LOW MDM: CPT

## 2020-12-17 PROCEDURE — 85610 PROTHROMBIN TIME: CPT | Performed by: PHYSICIAN ASSISTANT

## 2020-12-17 PROCEDURE — 93970 EXTREMITY STUDY: CPT

## 2020-12-17 NOTE — ED PROVIDER NOTES
Subjective   Chief Complaint: Leg swelling, pain    Patient is a 55-year-old  male with history of A. fib on warfarin diabetes, hypertension presents the ER with chief complaint of right leg pain for 1 day.  Patient states that he works at [employer] and is on his feet during his entire shift.  Patient states that after working his legs are normally swollen, swelling seems to be alleviated with raising his legs once he gets home.  Patient states normally his left ankle swells more than his right, this is not new.  Patient states that he started to have some pain last night that is new.  He describes as a pressure sharp pain in his right ankle does not radiate that he rates 0/10 at rest, 7/10 when he is walking or bearing weight.  Patient states he does take warfarin for his A. fib.  Patient denies any chest pain shortness of breath headache or dizziness.  He denies any abdominal pain, nausea vomiting or diarrhea.    Location: Right ankle    Quality: Throbbing    Duration: Today    Timing: Constant    Severity: Mild to moderate    Associated Symptoms: Swelling    PCP: Lluvia Sarabia      History provided by:  Patient      Review of Systems   Constitutional: Negative for chills and fever.   HENT: Negative for sore throat and trouble swallowing.    Respiratory: Negative for shortness of breath and wheezing.    Cardiovascular: Positive for leg swelling. Negative for chest pain and palpitations.   Gastrointestinal: Negative for abdominal pain, diarrhea, nausea and vomiting.   Genitourinary: Negative for dysuria.   Musculoskeletal: Positive for arthralgias. Negative for myalgias.        Left ankle pain   Skin: Negative for color change and rash.   Neurological: Negative for headaches.   Psychiatric/Behavioral: Negative for behavioral problems.   All other systems reviewed and are negative.      Past Medical History:   Diagnosis Date   • Acute respiratory failure (CMS/HCC)    • Arrhythmia    • Atrial  fibrillation (CMS/[facility])    • Cardiac arrest with ventricular fibrillation (CMS/HCC)    • Cardiomyopathy (CMS/[facility])    • Diabetes mellitus (CMS/[facility])    • Diabetes mellitus type I (CMS/[facility])    • Hyperlipidemia    • Hypertension    • Peripheral neuropathy    • Pneumonia    • Sleep apnea    • Substance abuse (CMS/[facility])        Allergies   Allergen Reactions   • Penicillin V Potassium Unknown (See Comments)   • Penicillins Rash       Past Surgical History:   Procedure Laterality Date   • CARDIAC CATHETERIZATION N/A 3/15/2018    Procedure: Left Heart Cath and right heart cath;  Surgeon: Valentin Moya MD;  Location: [facility] CATH INVASIVE LOCATION;  Service: Cardiovascular   • CARDIAC ELECTROPHYSIOLOGY PROCEDURE N/A 3/16/2018    Procedure: Device Implant-Pollock    ICD;  Surgeon: Robert Jauregui MD;  Location: [facility] CATH INVASIVE LOCATION;  Service: Cardiovascular   • CARDIAC ELECTROPHYSIOLOGY PROCEDURE N/A 5/17/2018    Procedure: NIPS  Threshold test of his ICD-Pollock  Needs to be scheduled once he has had therapeutic INR's for 3-4 weeks in a row.;  Surgeon: Robert Jauregui MD;  Location: [facility] CATH INVASIVE LOCATION;  Service: Cardiology   • CARDIAC ELECTROPHYSIOLOGY PROCEDURE N/A 5/17/2018    Procedure: Cardioversion;  Surgeon: Robert Jauregui MD;  Location: [facility] CATH INVASIVE LOCATION;  Service: Cardiology   • COLONOSCOPY     • INSERT / REPLACE / REMOVE PACEMAKER      ICD [place] PLACED 3/16/18   • KNEE ARTHROSCOPY Bilateral        Family History   Problem Relation Age of Onset   • Alcohol abuse Maternal Aunt    • No Known Problems Mother    • Heart disease Father    • Atrial fibrillation Father    • No Known Problems Maternal Grandmother    • No Known Problems Maternal Grandfather    • No Known Problems Paternal Grandmother    • No Known Problems Paternal Grandfather        Social History     Socioeconomic History   • Marital status: Single     Spouse name: Not on file   • Number of children: Not on file   • Years of  education: Not on file   • Highest education level: Not on file   Tobacco Use   • Smoking status: Former Smoker     Years: 4.00     Types: Cigarettes     Quit date:      Years since quittin.9   • Smokeless tobacco: Never Used   Substance and Sexual Activity   • Alcohol use: Yes     Alcohol/week: 2.0 standard drinks     Types: 2 Cans of beer per week     Comment: NO DRINKING SINCE 3/2018, HX DRINKING  ETOH   • Drug use: Yes     Types: Cocaine(coke)     Comment: LAST USED IN 2018   • Sexual activity: Defer           Objective   Physical Exam  Vitals signs and nursing note reviewed.   Constitutional:       General: He is not in acute distress.     Appearance: Normal appearance. He is normal weight. He is not toxic-appearing or diaphoretic.   HENT:      Head: Normocephalic and atraumatic.   Neck:      Musculoskeletal: Normal range of motion.   Cardiovascular:      Rate and Rhythm: Normal rate. Rhythm irregular.      Pulses: Normal pulses.      Heart sounds: Normal heart sounds.      Comments: A fib  Pulmonary:      Effort: Pulmonary effort is normal.      Breath sounds: Normal breath sounds. No wheezing.   Abdominal:      General: Abdomen is flat.      Tenderness: There is no abdominal tenderness.   Musculoskeletal: Normal range of motion.         General: Swelling and tenderness present. No signs of injury.      Right lower leg: Edema present.      Left lower leg: Edema present.      Comments: Bilateral lower extremity edema which patient states is typical for him, left greater than right.  Mild ecchymosis and varicosities noted bilateral lower extremities as well.  Distal pulses present 2+ bilaterally  Sensation soft touch intact, motor strength 5/5 bilateral lower extremities  Tenderness to palpation lateral left ankle lateral calf, Homans' sign negative.  Anterior compartments are soft   Skin:     General: Skin is warm.      Capillary Refill: Capillary refill takes less than 2 seconds.   Neurological:  "     General: No focal deficit present.      Mental Status: He is alert and oriented to person, place, and time.      Cranial Nerves: No cranial nerve deficit.   Psychiatric:         Mood and Affect: Mood normal.         Behavior: Behavior normal.         Procedures           ED Course  ED Course as of Dec 17 1250   Thu Dec 17, 2020   0910 Currently awaiting duplex scan    [MM]   1005 Per Tatiana in vascular, Doppler negative for DVT or SVT    [MM]      ED Course User Index  [MM] Jodie Olsen PA    /76   Pulse 70   Temp 98.6 °F (37 °C)   Resp 18   Ht 198.1 cm (78\")   Wt 131 kg (288 lb 2.3 oz)   SpO2 98%   BMI 33.30 kg/m²   Labs Reviewed   PROTIME-INR - Abnormal; Notable for the following components:       Result Value    Protime 15.3 (*)     INR 1.41 (*)     All other components within normal limits     Medications - No data to display  No radiology results for the last day                [id number] Score: 3                          MDM  Number of Diagnoses or Management Options  Pain and swelling of left lower leg:   Diagnosis management comments: MEDICAL DECISION  Epic Chart Review:  Comorbidities: Afib, cardiomyopathy, DM, HTN, CAD  Differentials: DVT, SVT, cellulitis; this list is not all inclusive and does not constitute the entirety of considered causes  Radiology interpretation:  Images reviewed by me and interpreted by radiologist,   Duplex Doppler bilateral lower extremities negative for acute DVT or SVT  Lab interpretation:  Labs viewed by me significant for, PT/INR subtherapeutic, 15.3/1.41    While in the ED IV was placed and labs were obtained appropriate PPE was worn during exam and throughout all encounters with the patient.  Patient had the above evaluation.  Patient only complaint is left ankle pain and some swelling for 1 day.  Patient reports chronic lower extremity edema, left greater than right, only reports pain is new.  Physical exam relatively unremarkable, patient does " appear to have bilateral swelling surrounding his ankles with no surrounding erythema or signs of cellulitis.  Patient denies any chest pain shortness of breath, palpitations headache dizziness nausea vomiting or diarrhea.  Denied any fever or chills.  Patient currently takes warfarin for A. fib.  PT/INR checked and found to be somewhat subtherapeutic.  Doppler bilateral lower extremities negative for acute DVT or SVT.  Patient states that she does not wear compression stockings at home, was working overnight last night and walking constantly.  Patient continued lightheadedness with this.  Patient denies any history of PE or DVT.  Encourage patient to use compression stockings to help with edema and to follow-up with primary care or orthopedic specialist for further management evaluation.    Discharge plan and instructions were discussed with the patient who verbalized understanding and is in agreement with the plan, all questions were answered at this time.  Patient is aware of signs symptoms that would require immediate return to the emergency room.  Patient understands importance of following up with primary care provider for further evaluation and worsening concerns as well as blood pressure recheck in the next 4 weeks.    Patient remained afebrile, nontoxic-appearing, no acute respiratory distress throughout entire emergency room stay.  Patient was discharged in improved stable condition with an upright steady gait.         Amount and/or Complexity of Data Reviewed  Clinical lab tests: reviewed and ordered  Tests in the radiology section of CPT®: reviewed and ordered    Patient Progress  Patient progress: stable      Final diagnoses:   Pain and swelling of left lower leg            Jodie Olsen PA  12/17/20 9001

## 2020-12-17 NOTE — DISCHARGE INSTRUCTIONS
Continue take your warfarin and other medications as prescribed by Dr. Rodrigez and your primary care.  Consider wearing compression stockings while at work to help with swelling.  When you are home make sure to rest her legs and elevate them to help with swelling.    Follow-up with primary care for new or worsening concerns  Follow-up with Dr. Rodrigez for further management evaluation.    Return to the ER for new or worsening symptoms   n/a

## 2020-12-18 ENCOUNTER — TELEPHONE (OUTPATIENT)
Dept: CARDIOLOGY | Facility: CLINIC | Age: 55
End: 2020-12-18

## 2020-12-18 ENCOUNTER — ANTICOAGULATION VISIT (OUTPATIENT)
Dept: CARDIOLOGY | Facility: CLINIC | Age: 55
End: 2020-12-18

## 2020-12-18 VITALS — DIASTOLIC BLOOD PRESSURE: 98 MMHG | WEIGHT: 290 LBS | BODY MASS INDEX: 33.51 KG/M2 | SYSTOLIC BLOOD PRESSURE: 132 MMHG

## 2020-12-18 DIAGNOSIS — Z79.01 LONG TERM (CURRENT) USE OF ANTICOAGULANTS: ICD-10-CM

## 2020-12-18 DIAGNOSIS — I46.9 CARDIAC ARREST WITH VENTRICULAR FIBRILLATION (HCC): ICD-10-CM

## 2020-12-18 DIAGNOSIS — I48.20 CHRONIC ATRIAL FIBRILLATION (HCC): ICD-10-CM

## 2020-12-18 DIAGNOSIS — I49.01 CARDIAC ARREST WITH VENTRICULAR FIBRILLATION (HCC): ICD-10-CM

## 2020-12-18 LAB — INR PPP: 1.9 (ref 0.9–1.1)

## 2020-12-18 PROCEDURE — 85610 PROTHROMBIN TIME: CPT | Performed by: INTERNAL MEDICINE

## 2020-12-18 PROCEDURE — 36416 COLLJ CAPILLARY BLOOD SPEC: CPT | Performed by: INTERNAL MEDICINE

## 2020-12-18 NOTE — TELEPHONE ENCOUNTER
May take extra 40 mg of Lasix for the next 3 to 4 days.  
Pt advised.     Advised pt to call and let us know how he is doing on Monday apLPN   
Pt went to ER for leg pain. Doppler was negative but patient is still having 3+ pitting edema bilateral lower extremities. Patient takes 40mg Lasix daily.     Pt was advised by ER to start wearing compression stockings.     Pt scheduled for follow up     Please advise     
Initial (On Arrival)

## 2020-12-22 ENCOUNTER — LAB (OUTPATIENT)
Dept: LAB | Facility: HOSPITAL | Age: 55
End: 2020-12-22

## 2020-12-22 ENCOUNTER — OFFICE VISIT (OUTPATIENT)
Dept: ENDOCRINOLOGY | Facility: CLINIC | Age: 55
End: 2020-12-22

## 2020-12-22 VITALS
HEART RATE: 86 BPM | SYSTOLIC BLOOD PRESSURE: 130 MMHG | TEMPERATURE: 97.6 F | OXYGEN SATURATION: 99 % | WEIGHT: 283.8 LBS | BODY MASS INDEX: 32.84 KG/M2 | DIASTOLIC BLOOD PRESSURE: 70 MMHG | HEIGHT: 78 IN

## 2020-12-22 DIAGNOSIS — E78.2 MIXED HYPERLIPIDEMIA: ICD-10-CM

## 2020-12-22 DIAGNOSIS — E55.9 VITAMIN D DEFICIENCY: ICD-10-CM

## 2020-12-22 DIAGNOSIS — E10.49 TYPE 1 DIABETES MELLITUS WITH OTHER DIABETIC NEUROLOGICAL COMPLICATION (HCC): Primary | ICD-10-CM

## 2020-12-22 DIAGNOSIS — E05.90 HYPERTHYROIDISM: ICD-10-CM

## 2020-12-22 DIAGNOSIS — E10.49 TYPE 1 DIABETES MELLITUS WITH OTHER DIABETIC NEUROLOGICAL COMPLICATION (HCC): ICD-10-CM

## 2020-12-22 DIAGNOSIS — Z79.899 LONG TERM USE OF DRUG: ICD-10-CM

## 2020-12-22 LAB
25(OH)D3 SERPL-MCNC: 29.3 NG/ML (ref 30–100)
ALBUMIN SERPL-MCNC: 4 G/DL (ref 3.5–5.2)
ALBUMIN/GLOB SERPL: 1.7 G/DL
ALP SERPL-CCNC: 110 U/L (ref 39–117)
ALT SERPL W P-5'-P-CCNC: 20 U/L (ref 1–41)
ANION GAP SERPL CALCULATED.3IONS-SCNC: 10.2 MMOL/L (ref 5–15)
AST SERPL-CCNC: 24 U/L (ref 1–40)
BASOPHILS # BLD AUTO: 0.06 10*3/MM3 (ref 0–0.2)
BASOPHILS NFR BLD AUTO: 0.8 % (ref 0–1.5)
BILIRUB SERPL-MCNC: 0.6 MG/DL (ref 0–1.2)
BUN SERPL-MCNC: 16 MG/DL (ref 6–20)
BUN/CREAT SERPL: 15.2 (ref 7–25)
CALCIUM SPEC-SCNC: 8.6 MG/DL (ref 8.6–10.5)
CHLORIDE SERPL-SCNC: 96 MMOL/L (ref 98–107)
CO2 SERPL-SCNC: 28.8 MMOL/L (ref 22–29)
CREAT SERPL-MCNC: 1.05 MG/DL (ref 0.76–1.27)
DEPRECATED RDW RBC AUTO: 48.8 FL (ref 37–54)
EOSINOPHIL # BLD AUTO: 0.15 10*3/MM3 (ref 0–0.4)
EOSINOPHIL NFR BLD AUTO: 2.1 % (ref 0.3–6.2)
ERYTHROCYTE [DISTWIDTH] IN BLOOD BY AUTOMATED COUNT: 12.9 % (ref 12.3–15.4)
GFR SERPL CREATININE-BSD FRML MDRD: 74 ML/MIN/1.73
GLOBULIN UR ELPH-MCNC: 2.4 GM/DL
GLUCOSE BLDC GLUCOMTR-MCNC: 237 MG/DL (ref 70–105)
GLUCOSE SERPL-MCNC: 282 MG/DL (ref 65–99)
HBA1C MFR BLD: 12.7 % (ref 3.5–5.6)
HCT VFR BLD AUTO: 41.1 % (ref 37.5–51)
HGB BLD-MCNC: 13.4 G/DL (ref 13–17.7)
IMM GRANULOCYTES # BLD AUTO: 0.02 10*3/MM3 (ref 0–0.05)
IMM GRANULOCYTES NFR BLD AUTO: 0.3 % (ref 0–0.5)
LYMPHOCYTES # BLD AUTO: 1.64 10*3/MM3 (ref 0.7–3.1)
LYMPHOCYTES NFR BLD AUTO: 22.8 % (ref 19.6–45.3)
MCH RBC QN AUTO: 33.5 PG (ref 26.6–33)
MCHC RBC AUTO-ENTMCNC: 32.6 G/DL (ref 31.5–35.7)
MCV RBC AUTO: 102.8 FL (ref 79–97)
MONOCYTES # BLD AUTO: 0.54 10*3/MM3 (ref 0.1–0.9)
MONOCYTES NFR BLD AUTO: 7.5 % (ref 5–12)
NEUTROPHILS NFR BLD AUTO: 4.77 10*3/MM3 (ref 1.7–7)
NEUTROPHILS NFR BLD AUTO: 66.5 % (ref 42.7–76)
NRBC BLD AUTO-RTO: 0 /100 WBC (ref 0–0.2)
PLATELET # BLD AUTO: 229 10*3/MM3 (ref 140–450)
PMV BLD AUTO: 10.2 FL (ref 6–12)
POTASSIUM SERPL-SCNC: 3.8 MMOL/L (ref 3.5–5.2)
PROT SERPL-MCNC: 6.4 G/DL (ref 6–8.5)
RBC # BLD AUTO: 4 10*6/MM3 (ref 4.14–5.8)
SODIUM SERPL-SCNC: 135 MMOL/L (ref 136–145)
T4 FREE SERPL-MCNC: 1.1 NG/DL (ref 0.93–1.7)
TSH SERPL DL<=0.05 MIU/L-ACNC: 4.6 UIU/ML (ref 0.27–4.2)
WBC # BLD AUTO: 7.18 10*3/MM3 (ref 3.4–10.8)

## 2020-12-22 PROCEDURE — 83036 HEMOGLOBIN GLYCOSYLATED A1C: CPT

## 2020-12-22 PROCEDURE — 84439 ASSAY OF FREE THYROXINE: CPT | Performed by: INTERNAL MEDICINE

## 2020-12-22 PROCEDURE — 80053 COMPREHEN METABOLIC PANEL: CPT | Performed by: INTERNAL MEDICINE

## 2020-12-22 PROCEDURE — 82962 GLUCOSE BLOOD TEST: CPT | Performed by: INTERNAL MEDICINE

## 2020-12-22 PROCEDURE — 85025 COMPLETE CBC W/AUTO DIFF WBC: CPT | Performed by: INTERNAL MEDICINE

## 2020-12-22 PROCEDURE — 99214 OFFICE O/P EST MOD 30 MIN: CPT | Performed by: INTERNAL MEDICINE

## 2020-12-22 PROCEDURE — 36415 COLL VENOUS BLD VENIPUNCTURE: CPT | Performed by: INTERNAL MEDICINE

## 2020-12-22 PROCEDURE — 84443 ASSAY THYROID STIM HORMONE: CPT | Performed by: INTERNAL MEDICINE

## 2020-12-22 PROCEDURE — 82306 VITAMIN D 25 HYDROXY: CPT | Performed by: INTERNAL MEDICINE

## 2020-12-22 NOTE — PATIENT INSTRUCTIONS
Labs drawn today.  Will call you with the lab results.  Increase Basaglar to 36 units @ 10p.  See eye doctor.  See podiatrist.  Drink plenty of water.  Continue exercise.  Continue Humalog, atorvastatin & methimazole for now.  Call if blood sugars are running under 100 or over 200.  F/u in 6 months, with fasting labs prior.

## 2020-12-27 RX ORDER — INSULIN GLARGINE 100 [IU]/ML
INJECTION, SOLUTION SUBCUTANEOUS
Qty: 15 ML | Refills: 4
Start: 2020-12-27 | End: 2021-03-04

## 2020-12-27 RX ORDER — METHIMAZOLE 10 MG/1
TABLET ORAL
Start: 2020-12-27 | End: 2021-09-22

## 2020-12-27 NOTE — PROGRESS NOTES
[facility]        Patient Care Team:  Lluvia Sarabia APRN as PCP - Josie Winter MD as Consulting Physician (Endocrinology)  Iliana Rodrigez MD as Consulting Physician (Cardiology)    Chief Complaint:    Chief Complaint   Patient presents with   • Diabetes     type 1, bs 237, pp 1.5 hr, insulin 3.5 hrs         Subjective   Here for diabetes f/u  Blood sugars: did not bring records. Says they are higher in the evening  Ran out of Basaglar one month ago  Exercise program: walks @ work  Due for eye exam    Interval History:     Patient Complaints: swelling  Patient Denies:  hypoglycemia  History taken from: patient    Review of Systems:   Review of Systems   HENT: Negative for trouble swallowing.    Eyes: Negative for blurred vision.   Cardiovascular: Positive for leg swelling. Negative for palpitations.   Gastrointestinal: Negative for nausea.   Endocrine: Negative for polyuria.   Neurological: Negative for headache.     Lost  12 lb since last visit    Objective     Vital Signs      Vitals:    12/22/20 1121   BP: 130/70   Pulse: 86   Temp: 97.6 °F (36.4 °C)   SpO2: 99%         Physical Exam:     General Appearance:    Alert, cooperative, in no acute distress. Obese   Head:    Normocephalic, without obvious abnormality, atraumatic   Eyes:            Lids and lashes normal, conjunctivae and sclerae normal, no   icterus, no pallor, corneas clear, PERRLA   Throat:   No oral lesions,  oral mucosa moist   Neck:   No adenopathy, supple,  no thyromegaly, no   carotid bruit   Lungs:     Clear     Heart:    Regular rhythm and normal rate   Chest Wall:    No abnormalities observed   Abdomen:     Normal bowel sounds, soft                 Extremities:   Plantar calluses, 2+ edema               Pulses:   Pulses palpable and equal bilaterally   Skin:   Dry   Neurologic:  DTR absent in ankles, vibratory sense 75% decreased in toes, able to feel the 10g monofilament ( same as 1y ago )             Results Review:    I have reviewed the patient's new clinical results, labs & imaging.    Medication Review:   Prior to Admission medications    Medication Sig Start Date End Date Taking? Authorizing Provider   atorvastatin (LIPITOR) 40 MG tablet Take 40 mg by mouth Daily.   Yes Geovanni Ramos MD   carvedilol (COREG) 25 MG tablet Take 1 tablet by mouth 2 (Two) Times a Day. 11/6/20  Yes Iliana Rodrigez MD   dilTIAZem LA (Matzim LA) 180 MG 24 hr tablet Take 1 tablet by mouth Daily.  Patient taking differently: Take 180 mg by mouth 2 (two) times a day. 11/6/20  Yes Iliana Rodrigez MD   FREESTYLE LITE test strip USE TO CHECK BLOOD SUGAR 4-5 TIMES A DAY DX E10.65 3/25/20  Yes Josie Montesinos MD   furosemide (LASIX) 40 MG tablet TAKE 1 TABLET BY MOUTH EVERY DAY 11/30/20  Yes Iliana Rodrigez MD   HUMALOG KWIKPEN 100 UNIT/ML solution pen-injector INJECT 1 UNIT:10 G CARB WITH MEALS 3X/DAY, AND 1 UNIT:15 G CARB WITH SNACKS, MAX OF 70 UNITS/DAY 3/16/20  Yes Josie Montesinos MD   Insulin Glargine (BASAGLAR KWIKPEN) 100 UNIT/ML injection pen Inject 32 units @ 3 pm dx:e10.65  Patient taking differently: Inject 32 units @ 10 pm dx:e10.65 8/24/20  Yes Josie Montesinos MD   methIMAzole (TAPAZOLE) 10 MG tablet TAKE 1 TABLET BY MOUTH TWICE A DAY 8/31/20  Yes Josie Montesinos MD   Omega-3 Fatty Acids (FISH OIL) 1000 MG capsule capsule Take  by mouth Daily With Breakfast.   Yes Geovanni Ramos MD   pantoprazole (PROTONIX) 40 MG EC tablet Take 40 mg by mouth Daily. 12/18/19  Yes Geovanni Ramos MD   valsartan (DIOVAN) 40 MG tablet Take 1 tablet by mouth Daily. 11/6/20  Yes Iliana Rodrigez MD   warfarin (COUMADIN) 5 MG tablet TAKE 1 AND A HALF TABS BY MOUTH SUN TUES ANDTHURS  WITH 2 TABLETS ALL OTHER DAYS OR AS DIRECTED. 11/6/20  Yes Iliana Rodrigez MD   vitamin D (ERGOCALCIFEROL) 1.25 MG (15170 UT) capsule capsule Take 1 capsule by mouth 1 (One) Time Per Week. 4/15/20   Josie Montesinos MD        Lab Results (most recent)     Procedure Component Value Units Date/Time    TSH [[id number]]  (Abnormal) Collected: 12/22/20 1239    Specimen: Blood Updated: 12/22/20 2036     TSH 4.600 uIU/mL     T4, Free [[id number]]  (Normal) Collected: 12/22/20 1239    Specimen: Blood Updated: 12/22/20 2036     Free T4 1.10 ng/dL     Narrative:      Results may be falsely increased if patient taking Biotin.      Vitamin D 25 Hydroxy [[id number]]  (Abnormal) Collected: 12/22/20 1239    Specimen: Blood Updated: 12/22/20 2018     25 Hydroxy, Vitamin D 29.3 ng/ml     Narrative:      Reference Range for Total Vitamin D 25(OH)     Deficiency <20.0 ng/mL   Insufficiency 21-29 ng/mL   Sufficiency  ng/mL  Toxicity >100 ng/ml    Results may be falsely increased if patient taking Biotin.      Comprehensive Metabolic Panel [[id number]]  (Abnormal) Collected: 12/22/20 1239    Specimen: Blood Updated: 12/22/20 1957     Glucose 282 mg/dL      BUN 16 mg/dL      Creatinine 1.05 mg/dL      Sodium 135 mmol/L      Potassium 3.8 mmol/L      Chloride 96 mmol/L      CO2 28.8 mmol/L      Calcium 8.6 mg/dL      Total Protein 6.4 g/dL      Albumin 4.00 g/dL      ALT (SGPT) 20 U/L      AST (SGOT) 24 U/L      Alkaline Phosphatase 110 U/L      Total Bilirubin 0.6 mg/dL      eGFR Non African Amer 74 mL/min/1.73      Globulin 2.4 gm/dL      A/G Ratio 1.7 g/dL      BUN/Creatinine Ratio 15.2     Anion Gap 10.2 mmol/L     Narrative:      GFR Normal >60  Chronic Kidney Disease <60  Kidney Failure <15      CBC & Differential [[id number]]  (Abnormal) Collected: 12/22/20 1239    Specimen: Blood Updated: 12/22/20 1938    Narrative:      The following orders were created for panel order CBC & Differential.  Procedure                               Abnormality         Status                     ---------                               -----------         ------                     CBC Auto Differential[[id number]]        Abnormal            Final result                  Please view results for these tests on the individual orders.    CBC Auto Differential [[id number]]  (Abnormal) Collected: 12/22/20 1239    Specimen: Blood Updated: 12/22/20 1938     WBC 7.18 10*3/mm3      RBC 4.00 10*6/mm3      Hemoglobin 13.4 g/dL      Hematocrit 41.1 %      .8 fL      MCH 33.5 pg      MCHC 32.6 g/dL      RDW 12.9 %      RDW-SD 48.8 fl      MPV 10.2 fL      Platelets 229 10*3/mm3      Neutrophil % 66.5 %      Lymphocyte % 22.8 %      Monocyte % 7.5 %      Eosinophil % 2.1 %      Basophil % 0.8 %      Immature Grans % 0.3 %      Neutrophils, Absolute 4.77 10*3/mm3      Lymphocytes, Absolute 1.64 10*3/mm3      Monocytes, Absolute 0.54 10*3/mm3      Eosinophils, Absolute 0.15 10*3/mm3      Basophils, Absolute 0.06 10*3/mm3      Immature Grans, Absolute 0.02 10*3/mm3      nRBC 0.0 /100 WBC     POC Glucose [[id number]]  (Abnormal) Collected: 12/22/20 1120    Specimen: Blood Updated: 12/22/20 1126     Glucose 237 mg/dL      Comment: Serial Number: [id number]:  [id number]               Lab Results   Component Value Date    HGBA1C 12.7 (H) 12/22/2020    HGBA1C 9.5 (H) 04/08/2020    HGBA1C 8.7 (H) 09/25/2019      Lab Results   Component Value Date    GLUCOSE 282 (H) 12/22/2020    BUN 16 12/22/2020    CREATININE 1.05 12/22/2020    EGFRIFNONA 74 12/22/2020    BCR 15.2 12/22/2020    K 3.8 12/22/2020    CO2 28.8 12/22/2020    CALCIUM 8.6 12/22/2020    ALBUMIN 4.00 12/22/2020    LABIL2 1.3 03/27/2019    AST 24 12/22/2020    ALT 20 12/22/2020    CHOL 113 04/08/2020    LDL 47 04/08/2020    HDL 57 04/08/2020    TRIG 44 04/08/2020     Lab Results   Component Value Date    TSH 4.600 (H) 12/22/2020    FREET4 1.10 12/22/2020    IDFA65QR 29.3 (L) 12/22/2020       Assessment/Plan     Diagnoses and all orders for this visit:    1. 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 Vitamin D deficiency  -     Vitamin D 25  Hydroxy    3. Mixed hyperlipidemia    4. Long term use of drug  -     CBC & Differential  -     Comprehensive Metabolic Panel  -     CBC Auto Differential    5. Hyperthyroidism  -     TSH  -     T4, Free    Other orders  -     POC Glucose    Glucose control worse, vit D better, thyroid now low. Will check lipid status next visit.    Increase Basaglar to 36 units @ 10p.  See eye doctor.  See podiatrist ( needs new insoles ).  Drink plenty of water.  Continue exercise.  Continue Humalog, atorvastatin & vit D supplements.  Decrease methimazole to one daily.  Call if blood sugars are running under 100 or over 200.        Josie Montesinos MD  12/27/20  17:44 EST

## 2020-12-28 ENCOUNTER — OFFICE VISIT (OUTPATIENT)
Dept: CARDIOLOGY | Facility: CLINIC | Age: 55
End: 2020-12-28

## 2020-12-28 VITALS
OXYGEN SATURATION: 98 % | HEART RATE: 106 BPM | WEIGHT: 282 LBS | DIASTOLIC BLOOD PRESSURE: 66 MMHG | BODY MASS INDEX: 32.63 KG/M2 | TEMPERATURE: 96.9 F | SYSTOLIC BLOOD PRESSURE: 112 MMHG | HEIGHT: 78 IN

## 2020-12-28 DIAGNOSIS — I25.5 ISCHEMIC CARDIOMYOPATHY: ICD-10-CM

## 2020-12-28 DIAGNOSIS — Z95.810 PRESENCE OF BIVENTRICULAR IMPLANTABLE CARDIOVERTER-DEFIBRILLATOR (ICD): ICD-10-CM

## 2020-12-28 DIAGNOSIS — I10 ESSENTIAL HYPERTENSION: ICD-10-CM

## 2020-12-28 DIAGNOSIS — I42.0 DILATED CARDIOMYOPATHY (HCC): ICD-10-CM

## 2020-12-28 DIAGNOSIS — I48.20 CHRONIC ATRIAL FIBRILLATION (HCC): Primary | ICD-10-CM

## 2020-12-28 DIAGNOSIS — E78.2 MIXED HYPERLIPIDEMIA: ICD-10-CM

## 2020-12-28 PROCEDURE — 99214 OFFICE O/P EST MOD 30 MIN: CPT | Performed by: INTERNAL MEDICINE

## 2020-12-28 PROCEDURE — 93000 ELECTROCARDIOGRAM COMPLETE: CPT | Performed by: INTERNAL MEDICINE

## 2020-12-28 NOTE — PROGRESS NOTES
Encounter Date:12/28/2020  Last seen 1/28/2020      Patient ID: Shannan Arguelles is a 55 y.o. male.    Chief Complaint:  Cardiomyopathy  Status post ICD  Chronic atrial fibrillation  Anticoagulation management        History of Present Illness  Since I have last seen, the patient has been without any chest discomfort ,shortness of breath, palpitations, dizziness or syncope.  Denies having any headache ,abdominal pain ,nausea, vomiting , diarrhea constipation, loss of weight or loss of appetite.  Denies having any excessive bruising ,hematuria or blood in the stool.    Review of all systems negative except as indicated.    Reviewed ROS.     Assessment and Plan         ]]]]]]]]]]]]]]]]]]]   impression  ==========  -   Status postcardiac arrest with ventricular fibrillation probably related to cocaine and cardiomyopathy March of 2018     -status post single-chamber ICD ([employer] ) March 2018 by Dr. Jauregui at [facility] .  VVI at 40 per minute.  Status post ICD shock x3 March 2019 due to atrial fibrillation with a rapid ventricular response.  Status post ICD shock due to atrial fibrillation with rapid ventricular response 1/24/2020     -Nonischemic cardiomyopathy probably secondary to cocaine use  Cardiac catheterization 03/15/2018 -nonobstructive disease - Dr. Moya at [facility]     -status post ICD 03/16/2018     - chronic atrial fibrillation on Coumadin.     -hypertension dyslipidemia diabetes peripheral neuropathy     -status post arthroscopic knee surgery     -nonsmoker     -Family history is positive for coronary artery disease     -allergic to penicillin.  =========  Plan  =============  Patient is not having any angina pectoris or congestive heart failure.  Patient did not have any further ICD shocks.  Interrogation of the ICD reveal excellent pacing parameters.  EKG showed atrial fibrillation with moderate ventricular response  Anticoagulation status was reviewed.  PT/INR on a  monthly basis.  Continue Coumadin.  Medications were reviewed and updated.  Continue Cardizem CD to 240 mg a day and continue Coreg 25 mg p.o. twice daily.  Recent labs are reviewed as below-CBC CMP  Follow-up in the office in 6 months with ICD interrogation.  May need additional rate controlling medications possibly Lanoxin if patient has any further episodes of atrial fibrillation with rapid ventricle response.  Further plan will depend on patient's progress.  ]]]]]]]]]]]]]]]]]]                   Diagnosis Plan   1. Chronic atrial fibrillation (CMS/HCC)  ECG 12 Lead   2. Ischemic cardiomyopathy  ECG 12 Lead   3. Presence of biventricular implantable cardioverter-defibrillator (ICD)     4. Essential hypertension     5. Mixed hyperlipidemia     6. Dilated cardiomyopathy (CMS/HCC)     LAB RESULTS (LAST 7 DAYS)    CBC  Results from last 7 days   Lab Units 12/22/20  1239   WBC 10*3/mm3 7.18   RBC 10*6/mm3 4.00*   HEMOGLOBIN g/dL 13.4   HEMATOCRIT % 41.1   MCV fL 102.8*   PLATELETS 10*3/mm3 229       BMP  Results from last 7 days   Lab Units 12/22/20  1239   SODIUM mmol/L 135*   POTASSIUM mmol/L 3.8   CHLORIDE mmol/L 96*   CO2 mmol/L 28.8   BUN mg/dL 16   CREATININE mg/dL 1.05   GLUCOSE mg/dL 282*       CMP   Results from last 7 days   Lab Units 12/22/20  1239   SODIUM mmol/L 135*   POTASSIUM mmol/L 3.8   CHLORIDE mmol/L 96*   CO2 mmol/L 28.8   BUN mg/dL 16   CREATININE mg/dL 1.05   GLUCOSE mg/dL 282*   ALBUMIN g/dL 4.00   BILIRUBIN mg/dL 0.6   ALK PHOS U/L 110   AST (SGOT) U/L 24   ALT (SGPT) U/L 20         BNP        TROPONIN        CoAg        Creatinine Clearance  Estimated Creatinine Clearance: 120.6 mL/min (by C-G formula based on SCr of 1.05 mg/dL).    ABG        Radiology  No radiology results for the last day                The following portions of the patient's history were reviewed and updated as appropriate: allergies, current medications, past family history, past medical history, past social history, past  surgical history and problem list.    Review of Systems   Constitution: Negative for fever and malaise/fatigue.   HENT: Negative for congestion and hearing loss.    Eyes: Negative for double vision and visual disturbance.   Cardiovascular: Negative for chest pain, claudication, dyspnea on exertion, leg swelling and syncope.   Respiratory: Negative for cough and shortness of breath.    Endocrine: Negative for cold intolerance.   Skin: Negative for color change and rash.   Musculoskeletal: Negative for arthritis and joint pain.   Gastrointestinal: Negative for abdominal pain and heartburn.   Genitourinary: Negative for hematuria.   Neurological: Negative for excessive daytime sleepiness and dizziness.   Psychiatric/Behavioral: Negative for depression. The patient is not nervous/anxious.    All other systems reviewed and are negative.        Current Outpatient Medications:   •  atorvastatin (LIPITOR) 40 MG tablet, Take 40 mg by mouth Daily., Disp: , Rfl:   •  carvedilol (COREG) 25 MG tablet, Take 1 tablet by mouth 2 (Two) Times a Day., Disp: 60 tablet, Rfl: 0  •  dilTIAZem LA (Matzim LA) 180 MG 24 hr tablet, Take 1 tablet by mouth Daily. (Patient taking differently: Take 180 mg by mouth 2 (two) times a day.), Disp: 30 tablet, Rfl: 0  •  furosemide (LASIX) 40 MG tablet, TAKE 1 TABLET BY MOUTH EVERY DAY, Disp: 30 tablet, Rfl: 5  •  HUMALOG KWIKPEN 100 UNIT/ML solution pen-injector, INJECT 1 UNIT:10 G CARB WITH MEALS 3X/DAY, AND 1 UNIT:15 G CARB WITH SNACKS, MAX OF 70 UNITS/DAY, Disp: 75 mL, Rfl: 2  •  Insulin Glargine (BASAGLAR KWIKPEN) 100 UNIT/ML injection pen, Inject 36 units @ 10 pm daily., Disp: 15 mL, Rfl: 4  •  methIMAzole (TAPAZOLE) 10 MG tablet, Take one tab daily., Disp: , Rfl:   •  pantoprazole (PROTONIX) 40 MG EC tablet, Take 40 mg by mouth Daily., Disp: , Rfl:   •  valsartan (DIOVAN) 40 MG tablet, Take 1 tablet by mouth Daily., Disp: 30 tablet, Rfl: 0  •  vitamin D (ERGOCALCIFEROL) 1.25 MG (50529 UT) capsule  capsule, Take 1 capsule by mouth 1 (One) Time Per Week., Disp: 12 capsule, Rfl: 3  •  warfarin (COUMADIN) 5 MG tablet, TAKE 1 AND A HALF TABS BY MOUTH SUN TUES ANDTHURS  WITH 2 TABLETS ALL OTHER DAYS OR AS DIRECTED., Disp: 60 tablet, Rfl: 1  •  FREESTYLE LITE test strip, USE TO CHECK BLOOD SUGAR 4-5 TIMES A DAY DX E10.65, Disp: 150 each, Rfl: 4    Allergies   Allergen Reactions   • Penicillins Rash       Family History   Problem Relation Age of Onset   • Alcohol abuse Maternal Aunt    • No Known Problems Mother    • Heart disease Father    • Atrial fibrillation Father    • No Known Problems Maternal Grandmother    • No Known Problems Maternal Grandfather    • No Known Problems Paternal Grandmother    • No Known Problems Paternal Grandfather        Past Surgical History:   Procedure Laterality Date   • CARDIAC CATHETERIZATION N/A 3/15/2018    Procedure: Left Heart Cath and right heart cath;  Surgeon: Valentin Moya MD;  Location: [facility] INVASIVE LOCATION;  Service: Cardiovascular   • CARDIAC ELECTROPHYSIOLOGY PROCEDURE N/A 3/16/2018    Procedure: Device Implant-[place]    ICD;  Surgeon: Robert Jauregui MD;  Location: [facility] INVASIVE LOCATION;  Service: Cardiovascular   • CARDIAC ELECTROPHYSIOLOGY PROCEDURE N/A 5/17/2018    Procedure: NIPS  Threshold test of his ICD-[place]  Needs to be scheduled once he has had therapeutic INR's for 3-4 weeks in a row.;  Surgeon: Robert Jauregui MD;  Location: [facility] INVASIVE LOCATION;  Service: Cardiology   • CARDIAC ELECTROPHYSIOLOGY PROCEDURE N/A 5/17/2018    Procedure: Cardioversion;  Surgeon: Robert Jauregui MD;  Location: [facility] INVASIVE LOCATION;  Service: Cardiology   • COLONOSCOPY     • INSERT / REPLACE / REMOVE PACEMAKER      ICD [place] PLACED 3/16/18   • KNEE ARTHROSCOPY Bilateral        Past Medical History:   Diagnosis Date   • Acute respiratory failure (CMS/HCC)    • Arrhythmia    • Atrial fibrillation (CMS/HCC)    • Cardiac arrest with ventricular  "fibrillation (CMS/HCC)    • Cardiomyopathy (CMS/HCC)    • Diabetes mellitus (CMS/HCC)    • Diabetes mellitus type I (CMS/HCC)    • Hyperlipidemia    • Hypertension    • Peripheral neuropathy    • Pneumonia    • Sleep apnea    • Substance abuse (CMS/HCC)        Family History   Problem Relation Age of Onset   • Alcohol abuse Maternal Aunt    • No Known Problems Mother    • Heart disease Father    • Atrial fibrillation Father    • No Known Problems Maternal Grandmother    • No Known Problems Maternal Grandfather    • No Known Problems Paternal Grandmother    • No Known Problems Paternal Grandfather        Social History     Socioeconomic History   • Marital status: Single     Spouse name: Not on file   • Number of children: Not on file   • Years of education: Not on file   • Highest education level: Not on file   Tobacco Use   • Smoking status: Former Smoker     Years: 4.00     Types: Cigarettes     Quit date:      Years since quittin.0   • Smokeless tobacco: Never Used   Substance and Sexual Activity   • Alcohol use: Yes     Alcohol/week: 2.0 standard drinks     Types: 2 Cans of beer per week     Comment: NO DRINKING SINCE 3/2018, HX DRINKING  ETOH   • Drug use: Yes     Types: Cocaine(coke)     Comment: LAST USED IN 2018   • Sexual activity: Defer           ECG 12 Lead    Date/Time: 2020 11:07 AM  Performed by: Iliana Rodrigez MD  Authorized by: Iliana Rodrigez MD   Comparison: compared with previous ECG   Similar to previous ECG  Comparison to previous ECG: Atrial fibrillation with controlled ventricular response 106/min nonspecific intraventricular conduction delay normal axis normal intervals no significant change from 10/15/2019                Objective:       Physical Exam    /66   Pulse 106   Temp 96.9 °F (36.1 °C)   Ht 198.1 cm (78\")   Wt 128 kg (282 lb)   SpO2 98%   BMI 32.59 kg/m²   The patient is alert, oriented and in no distress.    Vital signs as noted above.    Head " and neck revealed no carotid bruits or jugular venous distension.  No thyromegaly or lymphadenopathy is present.    Lungs clear.  No wheezing.  Breath sounds are normal bilaterally.    Heart normal first and second heart sounds.  No murmur..  No pericardial rub is present.  No gallop is present.    Abdomen soft and nontender.  No organomegaly is present.    Extremities revealed good peripheral pulses without any pedal edema.    Skin warm and dry.    Musculoskeletal system is grossly normal.    CNS grossly normal.  ICD site looks normal.    Reviewed and unchanged from last visit.

## 2021-01-04 RX ORDER — VALSARTAN 40 MG/1
TABLET ORAL
Qty: 90 TABLET | Refills: 3 | Status: SHIPPED | OUTPATIENT
Start: 2021-01-04

## 2021-01-26 ENCOUNTER — TELEPHONE (OUTPATIENT)
Dept: CARDIOLOGY | Facility: CLINIC | Age: 56
End: 2021-01-26

## 2021-02-10 ENCOUNTER — TELEPHONE (OUTPATIENT)
Dept: CARDIOLOGY | Facility: CLINIC | Age: 56
End: 2021-02-10

## 2021-02-21 PROCEDURE — 93296 REM INTERROG EVL PM/IDS: CPT | Performed by: INTERNAL MEDICINE

## 2021-02-21 PROCEDURE — 93295 DEV INTERROG REMOTE 1/2/MLT: CPT | Performed by: INTERNAL MEDICINE

## 2021-03-01 RX ORDER — FUROSEMIDE 40 MG/1
TABLET ORAL
Qty: 90 TABLET | Refills: 3 | Status: SHIPPED | OUTPATIENT
Start: 2021-03-01 | End: 2021-06-28 | Stop reason: SDUPTHER

## 2021-03-04 ENCOUNTER — APPOINTMENT (OUTPATIENT)
Dept: GENERAL RADIOLOGY | Facility: HOSPITAL | Age: 56
End: 2021-03-04

## 2021-03-04 ENCOUNTER — HOSPITAL ENCOUNTER (OUTPATIENT)
Facility: HOSPITAL | Age: 56
Setting detail: OBSERVATION
Discharge: HOME | End: 2021-03-06
Attending: EMERGENCY MEDICINE | Admitting: FAMILY MEDICINE

## 2021-03-04 DIAGNOSIS — I48.91 ATRIAL FIBRILLATION WITH RAPID VENTRICULAR RESPONSE (HCC): ICD-10-CM

## 2021-03-04 DIAGNOSIS — J18.9 PNEUMONIA OF RIGHT LOWER LOBE DUE TO INFECTIOUS ORGANISM: Primary | ICD-10-CM

## 2021-03-04 LAB
ALBUMIN SERPL-MCNC: 3.7 G/DL (ref 3.5–5.2)
ALBUMIN/GLOB SERPL: 1.6 G/DL
ALP SERPL-CCNC: 120 U/L (ref 39–117)
ALT SERPL W P-5'-P-CCNC: 22 U/L (ref 1–41)
AMPHET+METHAMPHET UR QL: POSITIVE
ANION GAP SERPL CALCULATED.3IONS-SCNC: 8 MMOL/L (ref 5–15)
AST SERPL-CCNC: 28 U/L (ref 1–40)
BARBITURATES UR QL SCN: NEGATIVE
BASOPHILS # BLD AUTO: 0.1 10*3/MM3 (ref 0–0.2)
BASOPHILS NFR BLD AUTO: 0.7 % (ref 0–1.5)
BENZODIAZ UR QL SCN: NEGATIVE
BILIRUB SERPL-MCNC: 0.6 MG/DL (ref 0–1.2)
BUN SERPL-MCNC: 18 MG/DL (ref 6–20)
BUN/CREAT SERPL: 17.5 (ref 7–25)
CALCIUM SPEC-SCNC: 8.8 MG/DL (ref 8.6–10.5)
CANNABINOIDS SERPL QL: NEGATIVE
CHLORIDE SERPL-SCNC: 102 MMOL/L (ref 98–107)
CO2 SERPL-SCNC: 27 MMOL/L (ref 22–29)
COCAINE UR QL: NEGATIVE
CREAT SERPL-MCNC: 1.03 MG/DL (ref 0.76–1.27)
D DIMER PPP FEU-MCNC: 0.3 MG/L (FEU) (ref 0–0.59)
D-LACTATE SERPL-SCNC: 1.2 MMOL/L (ref 0.5–2)
DEPRECATED RDW RBC AUTO: 50.8 FL (ref 37–54)
EOSINOPHIL # BLD AUTO: 0.2 10*3/MM3 (ref 0–0.4)
EOSINOPHIL NFR BLD AUTO: 2.2 % (ref 0.3–6.2)
ERYTHROCYTE [DISTWIDTH] IN BLOOD BY AUTOMATED COUNT: 14.1 % (ref 12.3–15.4)
GFR SERPL CREATININE-BSD FRML MDRD: 75 ML/MIN/1.73
GLOBULIN UR ELPH-MCNC: 2.3 GM/DL
GLUCOSE BLDC GLUCOMTR-MCNC: 264 MG/DL (ref 70–105)
GLUCOSE BLDC GLUCOMTR-MCNC: 286 MG/DL (ref 70–105)
GLUCOSE BLDC GLUCOMTR-MCNC: 319 MG/DL (ref 70–105)
GLUCOSE BLDC GLUCOMTR-MCNC: 415 MG/DL (ref 70–105)
GLUCOSE SERPL-MCNC: 213 MG/DL (ref 65–99)
HCT VFR BLD AUTO: 40.7 % (ref 37.5–51)
HGB BLD-MCNC: 14 G/DL (ref 13–17.7)
HOLD SPECIMEN: NORMAL
INR PPP: 1.17 (ref 2–3)
LYMPHOCYTES # BLD AUTO: 1.3 10*3/MM3 (ref 0.7–3.1)
LYMPHOCYTES NFR BLD AUTO: 15.7 % (ref 19.6–45.3)
MCH RBC QN AUTO: 35 PG (ref 26.6–33)
MCHC RBC AUTO-ENTMCNC: 34.4 G/DL (ref 31.5–35.7)
MCV RBC AUTO: 101.7 FL (ref 79–97)
METHADONE UR QL SCN: NEGATIVE
MONOCYTES # BLD AUTO: 0.4 10*3/MM3 (ref 0.1–0.9)
MONOCYTES NFR BLD AUTO: 4.5 % (ref 5–12)
NEUTROPHILS NFR BLD AUTO: 6.5 10*3/MM3 (ref 1.7–7)
NEUTROPHILS NFR BLD AUTO: 76.9 % (ref 42.7–76)
NRBC BLD AUTO-RTO: 0 /100 WBC (ref 0–0.2)
NT-PROBNP SERPL-MCNC: 1000 PG/ML (ref 0–900)
OPIATES UR QL: NEGATIVE
OXYCODONE UR QL SCN: NEGATIVE
PLATELET # BLD AUTO: 204 10*3/MM3 (ref 140–450)
PMV BLD AUTO: 8.3 FL (ref 6–12)
POTASSIUM SERPL-SCNC: 4 MMOL/L (ref 3.5–5.2)
PROT SERPL-MCNC: 6 G/DL (ref 6–8.5)
PROTHROMBIN TIME: 12.8 SECONDS (ref 19.4–28.5)
RBC # BLD AUTO: 4 10*6/MM3 (ref 4.14–5.8)
SARS-COV-2 RNA PNL SPEC NAA+PROBE: NORMAL
SODIUM SERPL-SCNC: 137 MMOL/L (ref 136–145)
TROPONIN T SERPL-MCNC: <0.01 NG/ML (ref 0–0.03)
TSH SERPL DL<=0.05 MIU/L-ACNC: 0.13 UIU/ML (ref 0.27–4.2)
WBC # BLD AUTO: 8.5 10*3/MM3 (ref 3.4–10.8)

## 2021-03-04 PROCEDURE — G0378 HOSPITAL OBSERVATION PER HR: HCPCS

## 2021-03-04 PROCEDURE — 85379 FIBRIN DEGRADATION QUANT: CPT | Performed by: NURSE PRACTITIONER

## 2021-03-04 PROCEDURE — 80307 DRUG TEST PRSMV CHEM ANLYZR: CPT | Performed by: EMERGENCY MEDICINE

## 2021-03-04 PROCEDURE — 94640 AIRWAY INHALATION TREATMENT: CPT

## 2021-03-04 PROCEDURE — 96365 THER/PROPH/DIAG IV INF INIT: CPT

## 2021-03-04 PROCEDURE — 63710000001 INSULIN LISPRO (HUMAN) PER 5 UNITS: Performed by: NURSE PRACTITIONER

## 2021-03-04 PROCEDURE — 96376 TX/PRO/DX INJ SAME DRUG ADON: CPT

## 2021-03-04 PROCEDURE — C9803 HOPD COVID-19 SPEC COLLECT: HCPCS

## 2021-03-04 PROCEDURE — 71045 X-RAY EXAM CHEST 1 VIEW: CPT

## 2021-03-04 PROCEDURE — 87635 SARS-COV-2 COVID-19 AMP PRB: CPT | Performed by: EMERGENCY MEDICINE

## 2021-03-04 PROCEDURE — 25010000002 DIGOXIN PER 500 MCG: Performed by: INTERNAL MEDICINE

## 2021-03-04 PROCEDURE — 83605 ASSAY OF LACTIC ACID: CPT

## 2021-03-04 PROCEDURE — 25010000002 CEFTRIAXONE PER 250 MG: Performed by: EMERGENCY MEDICINE

## 2021-03-04 PROCEDURE — 93005 ELECTROCARDIOGRAM TRACING: CPT | Performed by: EMERGENCY MEDICINE

## 2021-03-04 PROCEDURE — 63710000001 INSULIN GLARGINE PER 5 UNITS: Performed by: NURSE PRACTITIONER

## 2021-03-04 PROCEDURE — 82962 GLUCOSE BLOOD TEST: CPT

## 2021-03-04 PROCEDURE — 96372 THER/PROPH/DIAG INJ SC/IM: CPT

## 2021-03-04 PROCEDURE — 87040 BLOOD CULTURE FOR BACTERIA: CPT | Performed by: EMERGENCY MEDICINE

## 2021-03-04 PROCEDURE — 84443 ASSAY THYROID STIM HORMONE: CPT | Performed by: NURSE PRACTITIONER

## 2021-03-04 PROCEDURE — 93005 ELECTROCARDIOGRAM TRACING: CPT

## 2021-03-04 PROCEDURE — 85610 PROTHROMBIN TIME: CPT | Performed by: EMERGENCY MEDICINE

## 2021-03-04 PROCEDURE — 96366 THER/PROPH/DIAG IV INF ADDON: CPT

## 2021-03-04 PROCEDURE — 94799 UNLISTED PULMONARY SVC/PX: CPT

## 2021-03-04 PROCEDURE — 80053 COMPREHEN METABOLIC PANEL: CPT | Performed by: EMERGENCY MEDICINE

## 2021-03-04 PROCEDURE — 99244 OFF/OP CNSLTJ NEW/EST MOD 40: CPT | Performed by: INTERNAL MEDICINE

## 2021-03-04 PROCEDURE — 25010000002 METHYLPREDNISOLONE PER 125 MG: Performed by: EMERGENCY MEDICINE

## 2021-03-04 PROCEDURE — 99285 EMERGENCY DEPT VISIT HI MDM: CPT

## 2021-03-04 PROCEDURE — 96375 TX/PRO/DX INJ NEW DRUG ADDON: CPT

## 2021-03-04 PROCEDURE — 85025 COMPLETE CBC W/AUTO DIFF WBC: CPT | Performed by: EMERGENCY MEDICINE

## 2021-03-04 PROCEDURE — 83880 ASSAY OF NATRIURETIC PEPTIDE: CPT | Performed by: EMERGENCY MEDICINE

## 2021-03-04 PROCEDURE — 25010000002 ENOXAPARIN PER 10 MG: Performed by: EMERGENCY MEDICINE

## 2021-03-04 PROCEDURE — 84484 ASSAY OF TROPONIN QUANT: CPT | Performed by: EMERGENCY MEDICINE

## 2021-03-04 PROCEDURE — 25010000002 LORAZEPAM PER 2 MG: Performed by: EMERGENCY MEDICINE

## 2021-03-04 RX ORDER — METHYLPREDNISOLONE SODIUM SUCCINATE 125 MG/2ML
125 INJECTION, POWDER, LYOPHILIZED, FOR SOLUTION INTRAMUSCULAR; INTRAVENOUS ONCE
Status: COMPLETED | OUTPATIENT
Start: 2021-03-04 | End: 2021-03-04

## 2021-03-04 RX ORDER — DEXTROSE MONOHYDRATE 25 G/50ML
25 INJECTION, SOLUTION INTRAVENOUS
Status: DISCONTINUED | OUTPATIENT
Start: 2021-03-04 | End: 2021-03-06 | Stop reason: HOSPADM

## 2021-03-04 RX ORDER — CARVEDILOL 25 MG/1
25 TABLET ORAL EVERY 12 HOURS
Status: DISCONTINUED | OUTPATIENT
Start: 2021-03-04 | End: 2021-03-06 | Stop reason: HOSPADM

## 2021-03-04 RX ORDER — CHOLECALCIFEROL (VITAMIN D3) 125 MCG
5 CAPSULE ORAL NIGHTLY PRN
Status: DISCONTINUED | OUTPATIENT
Start: 2021-03-04 | End: 2021-03-06 | Stop reason: HOSPADM

## 2021-03-04 RX ORDER — WARFARIN SODIUM 5 MG/1
7.5 TABLET ORAL 3 TIMES WEEKLY
COMMUNITY
End: 2021-06-07 | Stop reason: SDUPTHER

## 2021-03-04 RX ORDER — CALCIUM CARBONATE 200(500)MG
2 TABLET,CHEWABLE ORAL 3 TIMES DAILY PRN
Status: DISCONTINUED | OUTPATIENT
Start: 2021-03-04 | End: 2021-03-06 | Stop reason: HOSPADM

## 2021-03-04 RX ORDER — IPRATROPIUM BROMIDE AND ALBUTEROL SULFATE 2.5; .5 MG/3ML; MG/3ML
3 SOLUTION RESPIRATORY (INHALATION)
Status: DISCONTINUED | OUTPATIENT
Start: 2021-03-04 | End: 2021-03-04

## 2021-03-04 RX ORDER — ONDANSETRON 2 MG/ML
4 INJECTION INTRAMUSCULAR; INTRAVENOUS EVERY 6 HOURS PRN
Status: DISCONTINUED | OUTPATIENT
Start: 2021-03-04 | End: 2021-03-06 | Stop reason: HOSPADM

## 2021-03-04 RX ORDER — WARFARIN SODIUM 5 MG/1
10 TABLET ORAL
COMMUNITY
End: 2021-06-07 | Stop reason: SDUPTHER

## 2021-03-04 RX ORDER — ATORVASTATIN CALCIUM 40 MG/1
40 TABLET, FILM COATED ORAL NIGHTLY
Status: DISCONTINUED | OUTPATIENT
Start: 2021-03-04 | End: 2021-03-06 | Stop reason: HOSPADM

## 2021-03-04 RX ORDER — INSULIN GLARGINE 100 [IU]/ML
32 INJECTION, SOLUTION SUBCUTANEOUS NIGHTLY
Status: DISCONTINUED | OUTPATIENT
Start: 2021-03-04 | End: 2021-03-06 | Stop reason: HOSPADM

## 2021-03-04 RX ORDER — INSULIN LISPRO 100 [IU]/ML
0-7 INJECTION, SOLUTION INTRAVENOUS; SUBCUTANEOUS AS NEEDED
Status: DISCONTINUED | OUTPATIENT
Start: 2021-03-04 | End: 2021-03-06 | Stop reason: HOSPADM

## 2021-03-04 RX ORDER — LORAZEPAM 2 MG/ML
1 INJECTION INTRAMUSCULAR ONCE
Status: COMPLETED | OUTPATIENT
Start: 2021-03-04 | End: 2021-03-04

## 2021-03-04 RX ORDER — ACETAMINOPHEN 500 MG
1000 TABLET ORAL ONCE
Status: COMPLETED | OUTPATIENT
Start: 2021-03-04 | End: 2021-03-04

## 2021-03-04 RX ORDER — DIGOXIN 0.25 MG/ML
0.5 INJECTION INTRAMUSCULAR; INTRAVENOUS ONCE
Status: COMPLETED | OUTPATIENT
Start: 2021-03-04 | End: 2021-03-04

## 2021-03-04 RX ORDER — DIGOXIN 0.25 MG/ML
0.25 INJECTION INTRAMUSCULAR; INTRAVENOUS ONCE AS NEEDED
Status: COMPLETED | OUTPATIENT
Start: 2021-03-04 | End: 2021-03-04

## 2021-03-04 RX ORDER — IPRATROPIUM BROMIDE AND ALBUTEROL SULFATE 2.5; .5 MG/3ML; MG/3ML
3 SOLUTION RESPIRATORY (INHALATION) ONCE
Status: COMPLETED | OUTPATIENT
Start: 2021-03-04 | End: 2021-03-04

## 2021-03-04 RX ORDER — METHIMAZOLE 5 MG/1
10 TABLET ORAL DAILY
Status: DISCONTINUED | OUTPATIENT
Start: 2021-03-04 | End: 2021-03-06 | Stop reason: HOSPADM

## 2021-03-04 RX ORDER — INSULIN LISPRO 100 [IU]/ML
0-7 INJECTION, SOLUTION INTRAVENOUS; SUBCUTANEOUS
Status: DISCONTINUED | OUTPATIENT
Start: 2021-03-04 | End: 2021-03-06 | Stop reason: HOSPADM

## 2021-03-04 RX ORDER — IPRATROPIUM BROMIDE AND ALBUTEROL SULFATE 2.5; .5 MG/3ML; MG/3ML
3 SOLUTION RESPIRATORY (INHALATION) EVERY 4 HOURS PRN
Status: DISCONTINUED | OUTPATIENT
Start: 2021-03-04 | End: 2021-03-06 | Stop reason: HOSPADM

## 2021-03-04 RX ORDER — FUROSEMIDE 40 MG/1
40 TABLET ORAL DAILY
Status: DISCONTINUED | OUTPATIENT
Start: 2021-03-04 | End: 2021-03-06 | Stop reason: HOSPADM

## 2021-03-04 RX ORDER — DILTIAZEM HYDROCHLORIDE 5 MG/ML
20 INJECTION INTRAVENOUS ONCE
Status: COMPLETED | OUTPATIENT
Start: 2021-03-04 | End: 2021-03-04

## 2021-03-04 RX ORDER — INSULIN GLARGINE 100 [IU]/ML
39 INJECTION, SOLUTION SUBCUTANEOUS NIGHTLY
COMMUNITY
End: 2021-04-19

## 2021-03-04 RX ORDER — LABETALOL HYDROCHLORIDE 5 MG/ML
20 INJECTION, SOLUTION INTRAVENOUS EVERY 6 HOURS PRN
Status: DISCONTINUED | OUTPATIENT
Start: 2021-03-04 | End: 2021-03-06 | Stop reason: HOSPADM

## 2021-03-04 RX ORDER — VALSARTAN 80 MG/1
40 TABLET ORAL DAILY
Status: DISCONTINUED | OUTPATIENT
Start: 2021-03-04 | End: 2021-03-06 | Stop reason: HOSPADM

## 2021-03-04 RX ORDER — DILTIAZEM HYDROCHLORIDE 5 MG/ML
30 INJECTION INTRAVENOUS ONCE
Status: COMPLETED | OUTPATIENT
Start: 2021-03-04 | End: 2021-03-04

## 2021-03-04 RX ORDER — GUAIFENESIN 600 MG/1
1200 TABLET, EXTENDED RELEASE ORAL EVERY 12 HOURS
Status: DISCONTINUED | OUTPATIENT
Start: 2021-03-04 | End: 2021-03-06 | Stop reason: HOSPADM

## 2021-03-04 RX ORDER — NICOTINE POLACRILEX 4 MG
15 LOZENGE BUCCAL
Status: DISCONTINUED | OUTPATIENT
Start: 2021-03-04 | End: 2021-03-06 | Stop reason: HOSPADM

## 2021-03-04 RX ORDER — AZITHROMYCIN 250 MG/1
500 TABLET, FILM COATED ORAL
Status: COMPLETED | OUTPATIENT
Start: 2021-03-04 | End: 2021-03-06

## 2021-03-04 RX ORDER — ACETAMINOPHEN 325 MG/1
650 TABLET ORAL EVERY 6 HOURS PRN
Status: DISCONTINUED | OUTPATIENT
Start: 2021-03-04 | End: 2021-03-06 | Stop reason: HOSPADM

## 2021-03-04 RX ADMIN — DIGOXIN 0.25 MG: 250 INJECTION, SOLUTION INTRAMUSCULAR; INTRAVENOUS; PARENTERAL at 15:27

## 2021-03-04 RX ADMIN — VALSARTAN 40 MG: 80 TABLET, FILM COATED ORAL at 14:19

## 2021-03-04 RX ADMIN — ATORVASTATIN CALCIUM 40 MG: 40 TABLET, FILM COATED ORAL at 20:15

## 2021-03-04 RX ADMIN — METHYLPREDNISOLONE SODIUM SUCCINATE 125 MG: 125 INJECTION, POWDER, FOR SOLUTION INTRAMUSCULAR; INTRAVENOUS at 08:45

## 2021-03-04 RX ADMIN — SODIUM CHLORIDE 15 MG/HR: 900 INJECTION, SOLUTION INTRAVENOUS at 21:20

## 2021-03-04 RX ADMIN — DIGOXIN 0.5 MG: 250 INJECTION, SOLUTION INTRAMUSCULAR; INTRAVENOUS; PARENTERAL at 11:03

## 2021-03-04 RX ADMIN — ACETAMINOPHEN 1000 MG: 500 TABLET ORAL at 09:52

## 2021-03-04 RX ADMIN — SODIUM CHLORIDE 10 MG/HR: 900 INJECTION, SOLUTION INTRAVENOUS at 06:44

## 2021-03-04 RX ADMIN — IPRATROPIUM BROMIDE AND ALBUTEROL SULFATE 3 ML: 2.5; .5 SOLUTION RESPIRATORY (INHALATION) at 08:24

## 2021-03-04 RX ADMIN — METHIMAZOLE 10 MG: 5 TABLET ORAL at 14:19

## 2021-03-04 RX ADMIN — LORAZEPAM 1 MG: 2 INJECTION INTRAMUSCULAR; INTRAVENOUS at 08:45

## 2021-03-04 RX ADMIN — INSULIN LISPRO 7 UNITS: 100 INJECTION, SOLUTION INTRAVENOUS; SUBCUTANEOUS at 20:27

## 2021-03-04 RX ADMIN — INSULIN LISPRO 4 UNITS: 100 INJECTION, SOLUTION INTRAVENOUS; SUBCUTANEOUS at 18:57

## 2021-03-04 RX ADMIN — DILTIAZEM HYDROCHLORIDE 20 MG: 5 INJECTION INTRAVENOUS at 06:35

## 2021-03-04 RX ADMIN — ENOXAPARIN SODIUM 140 MG: 150 INJECTION SUBCUTANEOUS at 09:53

## 2021-03-04 RX ADMIN — SODIUM CHLORIDE 15 MG/HR: 900 INJECTION, SOLUTION INTRAVENOUS at 14:18

## 2021-03-04 RX ADMIN — INSULIN GLARGINE 32 UNITS: 100 INJECTION, SOLUTION SUBCUTANEOUS at 20:15

## 2021-03-04 RX ADMIN — DILTIAZEM HYDROCHLORIDE 30 MG: 5 INJECTION INTRAVENOUS at 08:47

## 2021-03-04 RX ADMIN — FUROSEMIDE 40 MG: 40 TABLET ORAL at 14:19

## 2021-03-04 RX ADMIN — CEFTRIAXONE SODIUM 2 G: 10 INJECTION, POWDER, FOR SOLUTION INTRAVENOUS at 08:45

## 2021-03-04 RX ADMIN — AZITHROMYCIN DIHYDRATE 500 MG: 250 TABLET, FILM COATED ORAL at 08:45

## 2021-03-04 RX ADMIN — GUAIFENESIN 1200 MG: 600 TABLET, EXTENDED RELEASE ORAL at 20:15

## 2021-03-04 RX ADMIN — CARVEDILOL 25 MG: 25 TABLET, FILM COATED ORAL at 20:15

## 2021-03-04 NOTE — ED PROVIDER NOTES
Subjective   55-year-old male complaining of cough starting this morning.  He reports he had muscle aches and pains yesterday and felt tired.  He states he awoke in the middle the night having palpitations and had exertional dyspnea.  He reports his cough is been really productive of some yellowish sputum this morning he reports no hemoptysis.  Reports no leg pain or swelling.  He states he takes Coumadin but has not recently has a level checked.  He has has not had any discharge from his AICD.  The patient reports that he missed Lasix yesterday and has a little bit of swelling in his feet.          Review of Systems   Constitutional: Positive for chills, fatigue and fever.   HENT: Negative for sore throat.    Respiratory: Positive for cough, chest tightness, shortness of breath and wheezing.    Cardiovascular: Positive for chest pain, palpitations and leg swelling.   Neurological: Positive for weakness.   Hematological: Does not bruise/bleed easily.   All other systems reviewed and are negative.      Past Medical History:   Diagnosis Date   • Acute respiratory failure (CMS/HCC)    • Arrhythmia    • Atrial fibrillation (CMS/HCC)    • Cardiac arrest with ventricular fibrillation (CMS/HCC)    • Cardiomyopathy (CMS/HCC)    • Diabetes mellitus (CMS/HCC)    • Diabetes mellitus type I (CMS/HCC)    • Hyperlipidemia    • Hypertension    • Peripheral neuropathy    • Pneumonia    • Sleep apnea    • Substance abuse (CMS/HCC)        Allergies   Allergen Reactions   • Penicillins Rash       Past Surgical History:   Procedure Laterality Date   • CARDIAC CATHETERIZATION N/A 3/15/2018    Procedure: Left Heart Cath and right heart cath;  Surgeon: Valentin Moya MD;  Location:  INVASIVE LOCATION;  Service: Cardiovascular   • CARDIAC ELECTROPHYSIOLOGY PROCEDURE N/A 3/16/2018    Procedure: Device Implant-[place]    ICD;  Surgeon: Robert Jauregui MD;  Location:  INVASIVE LOCATION;  Service: Cardiovascular   •  CARDIAC ELECTROPHYSIOLOGY PROCEDURE N/A 2018    Procedure: NIPS  Threshold test of his ICD-[place]  Needs to be scheduled once he has had therapeutic INR's for 3-4 weeks in a row.;  Surgeon: Robert Jauregui MD;  Location:  [facility] INVAS[facility] LOCATION;  Service: Cardiology   • CARDIAC ELECTROPHYSIOLOGY PROCEDURE N/A 2018    Procedure: Cardioversion;  Surgeon: Robert Jauregui MD;  Location:  [facility] INVAS[facility] LOCATION;  Service: Cardiology   • COLONOSCOPY     • INSERT / REPLACE / REMOVE PACEMAKER      ICD [place] PLACED 3/16/18   • KNEE ARTHROSCOPY Bilateral        Family History   Problem Relation Age of Onset   • Alcohol abuse Maternal Aunt    • No Known Problems Mother    • Heart disease Father    • Atrial fibrillation Father    • No Known Problems Maternal Grandmother    • No Known Problems Maternal Grandfather    • No Known Problems Paternal Grandmother    • No Known Problems Paternal Grandfather        Social History     Socioeconomic History   • Marital status: Single     Spouse name: Not on file   • Number of children: Not on file   • Years of education: Not on file   • Highest education level: Not on file   Tobacco Use   • Smoking status: Former Smoker     Years: 4.00     Types: Cigarettes     Quit date:      Years since quittin.1   • Smokeless tobacco: Never Used   Substance and Sexual Activity   • Alcohol use: Yes     Alcohol/week: 2.0 standard drinks     Types: 2 Cans of beer per week     Comment: NO DRINKING SINCE 3/2018, HX DRINKING  ETOH   • Drug use: Yes     Types: Cocaine(coke)     Comment: LAST USED IN 2018   • Sexual activity: Defer           Objective   Physical Exam  Alert [place] Coma Scale 15   HEENT: Pupils equal and reactive to light. Conjunctivae are not injected. normal tympanic membranes. Oropharynx and nares are normal.   Neck: Supple. Midline trachea. No JVD. No goiter.   Chest: Scattered rhonchi and wheezes are noted bilaterally the patient has extensive right  lower lobe rales and equal breath sounds bilaterally irregular rate and rhythm without murmur or rub.  Patient is in atrial fib with rapid ventricular response with occasional PVCs   Abdomen: Positive bowel sounds nontender nondistended. No rebound or peritoneal signs. No CVA tenderness.   Extremities no clubbing cyanosis 2+ leg edema motor sensory exam is normal the full range of motion is intact   skin: Warm and dry, no rashes or petechia.   Lymphatic: No regional lymphadenopathy. No calf pain, swelling or Hugo's sign    Procedures           ED Course  ED Course as of Mar 04 0811   Thu Mar 04, 2021   0756 I examined the patient using the appropriate personal protective equipment.          [TH]      ED Course User Index  [TH] Sebastian Larson MD                                   Labs Reviewed   COMPREHENSIVE METABOLIC PANEL - Abnormal; Notable for the following components:       Result Value    Glucose 213 (*)     Alkaline Phosphatase 120 (*)     All other components within normal limits    Narrative:     GFR Normal >60  Chronic Kidney Disease <60  Kidney Failure <15     PROTIME-INR - Abnormal; Notable for the following components:    Protime 12.8 (*)     INR 1.17 (*)     All other components within normal limits   BNP (IN-HOUSE) - Abnormal; Notable for the following components:    proBNP 1,000.0 (*)     All other components within normal limits    Narrative:     Among patients with dyspnea, NT-proBNP is highly sensitive for the detection of acute congestive heart failure. In addition NT-proBNP of <300 pg/ml effectively rules out acute congestive heart failure with 99% negative predictive value.    Results may be falsely decreased if patient taking Biotin.     CBC WITH AUTO DIFFERENTIAL - Abnormal; Notable for the following components:    RBC 4.00 (*)     .7 (*)     MCH 35.0 (*)     Neutrophil % 76.9 (*)     Lymphocyte % 15.7 (*)     Monocyte % 4.5 (*)     All other components within normal limits    COVID-19,ABBOTT IN-HOUSE,NASAL SWAB (NO TRANSPORT MEDIA) 2 HR TAT - Normal    Narrative:     Fact sheet for providers: https://www.fda.gov/media/982489/download     Fact sheet for patients: https://www.fda.gov/media/560078/download    Test performed by PCR.  If inconsistent with clinical signs and symptoms patient should be tested with different authorized molecular test.   TROPONIN (IN-HOUSE) - Normal    Narrative:     Troponin T Reference Range:  <= 0.03 ng/mL-   Negative for AMI  >0.03 ng/mL-     Abnormal for myocardial necrosis.  Clinicians would have to utilize clinical acumen, EKG, Troponin and serial changes to determine if it is an Acute Myocardial Infarction or myocardial injury due to an underlying chronic condition.       Results may be falsely decreased if patient taking Biotin.     POC LACTATE - Normal   BLOOD CULTURE   BLOOD CULTURE   URINE DRUG SCREEN   POC LACTATE   CBC AND DIFFERENTIAL    Narrative:     The following orders were created for panel order CBC & Differential.  Procedure                               Abnormality         Status                     ---------                               -----------         ------                     CBC Auto Differential[[id number]]        Abnormal            Final result                 Please view results for these tests on the individual orders.   EXTRA TUBES    Narrative:     The following orders were created for panel order Extra Tubes.  Procedure                               Abnormality         Status                     ---------                               -----------         ------                     Gold Top - SST[[id number]]                                   Final result                 Please view results for these tests on the individual orders.   GOLD TOP - SST     Medications   dilTIAZem (CARDIZEM) 100 mg in 100 mL NS infusion (ADV) (10 mg/hr Intravenous New Bag 3/4/21 0644)   LORazepam (ATIVAN) injection 1 mg (has no administration in  time range)   cefTRIAXone (ROCEPHIN) in [facility] 2 GRAMS/20ml IV PUSH syringe (has no administration in time range)   azithromycin (ZITHROMAX) tablet 500 mg (has no administration in time range)   dilTIAZem (CARDIZEM) injection 30 mg (has no administration in time range)   ipratropium-albuterol (DUO-NEB) nebulizer solution 3 mL (has no administration in time range)   methylPREDNISolone sodium succinate (SOLU-Medrol) injection 125 mg (has no administration in time range)   acetaminophen (TYLENOL) tablet 1,000 mg (has no administration in time range)   enoxaparin (LOVENOX) injection 140 mg (has no administration in time range)   dilTIAZem (CARDIZEM) injection 20 mg (20 mg Intravenous Given 3/4/21 0635)     Xr Chest 1 View    Result Date: 3/4/2021  Multifocal airspace opacities in the right lung. Groundglass opacity in the left lower lobe. This could be seen with multifocal pneumonia. Underlying metastatic disease or malignancy cannot be excluded. Continued imaging and clinical follow-up is recommended. Background of emphysema is suspected.  Electronically Signed By-Antonia Gary MD On:3/4/2021 7:17 AM This report was finalized on [id number] by  Antonia Gary MD.            MDM  Number of Diagnoses or Management Options     Amount and/or Complexity of Data Reviewed  Clinical lab tests: reviewed and ordered  Tests in the radiology section of CPT®: ordered and reviewed  Tests in the medicine section of CPT®: reviewed  Review and summarize past medical records: yes  Discuss the patient with other providers: yes  Independent visualization of images, tracings, or specimens: yes    Risk of Complications, Morbidity, and/or Mortality  Presenting problems: high  Diagnostic procedures: high  Management options: high  General comments: The patient had cultures obtained the patient had rate control with IV Cardizem.  The patient was also given Tylenol.  The patient will be placed on community-acquired antibiotics.  He denies recent  substance abuse.  He was agreeable to this plan of treatment.        Final diagnoses:   Pneumonia of right lower lobe due to infectious organism   Atrial fibrillation with rapid ventricular response (CMS/HCC)            Sebastian Larson MD  03/04/21 0811

## 2021-03-04 NOTE — CONSULTS
Referring Provider: Sebastian Larson MD  Reason for Consultation:  Palpitations  Atrial fibrillation with rapid ventricular response    Patient Care Team:  Lluvia Sarabia APRN as PCP - Josie Winter MD as Consulting Physician (Endocrinology)  Iliana Rodrigez MD as Consulting Physician (Cardiology)    Chief complaint  Cough  Shortness of breath    Subjective .     History of present illness:  Shannan Arguelles is a 55 y.o. male who presents with history of having cough and shortness of breath.  Patient was having tiredness and muscle aches.  He woke up in the middle of the night with palpitations and exertional shortness of breath.  Patient was having elevated sputum.  Denies having any chest discomfort nausea vomiting.  Patient has chronic atrial fibrillation and apparently was supposed to be taking Coumadin.  Patient denies having any ICD discharge.  Denies having any lower extremity edema.  Patient came to the emergency room and was found to have atrial fibrillation with rapid ventricular response.  Patient apparently has admitted to using meth.  Patient has history of drug usage in the past.  Patient had cardiac arrest several years ago.  Cardiology consultation was requested.        ROS      Patient denies having any chest discomfort dizziness or syncope.  Denies having any headache ,abdominal pain ,nausea, vomiting , diarrhea constipation, loss of weight or loss of appetite.  Denies having any excessive bruising ,hematuria or blood in the stool.    Review of all systems negative except as indicated      History  Past Medical History:   Diagnosis Date   • Acute respiratory failure (CMS/HCC)    • Arrhythmia    • Atrial fibrillation (CMS/HCC)    • Cardiac arrest with ventricular fibrillation (CMS/HCC)    • Cardiomyopathy (CMS/HCC)    • Diabetes mellitus (CMS/HCC)    • Diabetes mellitus type I (CMS/HCC)    • Hyperlipidemia    • Hypertension    • Peripheral neuropathy    • Pneumonia    • Sleep  apnea    • Substance abuse (CMS/[facility])        Past Surgical History:   Procedure Laterality Date   • CARDIAC CATHETERIZATION N/A 3/15/2018    Procedure: Left Heart Cath and right heart cath;  Surgeon: Valentin Moya MD;  Location:  [facility] CATH INVASIVE LOCATION;  Service: Cardiovascular   • CARDIAC ELECTROPHYSIOLOGY PROCEDURE N/A 3/16/2018    Procedure: Device Implant-[place]    ICD;  Surgeon: Robert Jauregui MD;  [facility];  Service: Cardiovascular   • CARDIAC ELECTROPHYSIOLOGY PROCEDURE N/A 2018    Procedure: NIPS  Threshold test of his ICD-[place]  Needs to be scheduled once he has had therapeutic INR's for 3-4 weeks in a row.;  Surgeon: Robert Jauregui MD;  Location:  [facility] CATH INVASIVE LOCATION;  Service: Cardiology   • CARDIAC ELECTROPHYSIOLOGY PROCEDURE N/A 2018    Procedure: Cardioversion;  Surgeon: Robert Jauregui MD;  Location: [facility] CATH INVASIVE LOCATION;  Service: Cardiology   • COLONOSCOPY     • INSERT / REPLACE / REMOVE PACEMAKER      ICD [place] PLACED 3/16/18   • KNEE ARTHROSCOPY Bilateral        Family History   Problem Relation Age of Onset   • Alcohol abuse Maternal Aunt    • No Known Problems Mother    • Heart disease Father    • Atrial fibrillation Father    • No Known Problems Maternal Grandmother    • No Known Problems Maternal Grandfather    • No Known Problems Paternal Grandmother    • No Known Problems Paternal Grandfather        Social History     Tobacco Use   • Smoking status: Former Smoker     Years: 4.00     Types: Cigarettes     Quit date:      Years since quittin.1   • Smokeless tobacco: Never Used   Substance Use Topics   • Alcohol use: Yes     Alcohol/week: 2.0 standard drinks     Types: 2 Cans of beer per week     Comment: NO DRINKING SINCE 3/2018, HX DRINKING  ETOH   • Drug use: Yes     Types: Cocaine(coke)     Comment: LAST USED IN 2018        (Not in a hospital admission)        Penicillins    Scheduled Meds:atorvastatin, 40 mg,  "Oral, Daily  azithromycin, 500 mg, Oral, Q24H  carvedilol, 25 mg, Oral, BID  cefTRIAXone, 2 g, Intravenous, Q24H  furosemide, 40 mg, Oral, Daily  guaiFENesin, 1,200 mg, Oral, Q12H  insulin glargine, 32 Units, Subcutaneous, Nightly  ipratropium-albuterol, 3 mL, Nebulization, 4x Daily - RT  methIMAzole, 10 mg, Oral, Daily  valsartan, 40 mg, Oral, Daily      Continuous Infusions:dilTIAZem, 10 mg/hr, Last Rate: 15 mg/hr (03/04/21 0958)      PRN Meds:.•  acetaminophen  •  calcium carbonate  •  labetalol  •  melatonin  •  ondansetron    Objective     VITAL SIGNS  Vitals:    03/04/21 0948 03/04/21 0956 03/04/21 1000 03/04/21 1014   BP:   137/87    BP Location:       Patient Position:       Pulse:  (!) 141 (!) 137 (!) 138   Resp: 18      Temp:       TempSrc:       SpO2:  97% 98% 98%   Weight:       Height:           Flowsheet Rows      First Filed Value   Admission Height  198.1 cm (78\") Documented at 03/04/2021 0552   Admission Weight  135 kg (297 lb 9.9 oz) Documented at 03/04/2021 0552          No intake or output data in the 24 hours ending 03/04/21 1026     TELEMETRY: Atrial fibrillation with rapid ventricular response.    Physical Exam:  The patient is alert, oriented and in no distress.  Vital signs as noted above.  Head and neck revealed no carotid bruits or jugular venous distention.  No thyromegaly or lymph adenopathy is present  Lungs clear.  No wheezing.  Breath sounds are normal bilaterally.  Heart normal first and second heart sounds.No murmur.  No precordial rub is present.  No gallop is present.  Abdomen soft and nontender.  No organomegaly is present.  Extremities with good peripheral pulses without any pedal edema.  Skin warm and dry.  ICD site looks normal.  Musculoskeletal system is grossly normal  CNS grossly normal      Results Review:   I reviewed the patient's new clinical results.  Lab Results (last 24 hours)     Procedure Component Value Units Date/Time    Urine Drug Screen - Urine, Clean Catch " [[id number]]  (Abnormal) Collected: 03/04/21 0843    Specimen: Urine, Clean Catch Updated: 03/04/21 0923     Amphet/Methamphet, Screen Positive     Barbiturates Screen, Urine Negative     Benzodiazepine Screen, Urine Negative     Cocaine Screen, Urine Negative     Opiate Screen Negative     THC, Screen, Urine Negative     Methadone Screen, Urine Negative     Oxycodone Screen, Urine Negative    Narrative:      Negative Thresholds Per Drugs Screened:    Amphetamines                 500 ng/ml  Barbiturates                 200 ng/ml  Benzodiazepines              100 ng/ml  Cocaine                      300 ng/ml  Methadone                    300 ng/ml  Opiates                      300 ng/ml  Oxycodone                    100 ng/ml  THC                           50 ng/ml    The Normal Value for all drugs tested is negative. This report includes final unconfirmed screening results to be used for medical treatment purposes only. Unconfirmed results must not be used for non-medical purposes such as employment or legal testing. Clinical consideration should be applied to any drug of abuse test, particularly when unconfirmed results are used.          All urine drugs of abuse requests without chain of custody are for medical screening purposes only.  False positives are possible.      Blood Culture - Blood, Hand, Left [[id number]] Collected: 03/04/21 0844    Specimen: Blood from Hand, Left Updated: 03/04/21 0858    Extra Tubes [[id number]] Collected: 03/04/21 0633    Specimen: Blood, Venous Line Updated: 03/04/21 0745    Narrative:      The following orders were created for panel order Extra Tubes.  Procedure                               Abnormality         Status                     ---------                               -----------         ------                     [facility] - [facility][[id number]]                                   Final result                 Please view results for these tests on the individual orders.    Gold Top  - SST [[id number]] Collected: 03/04/21 0633    Specimen: Blood Updated: 03/04/21 0745     Extra Tube Hold for add-ons.     Comment: Auto resulted.       POC Lactate [[id number]]  (Normal) Collected: 03/04/21 0742    Specimen: Blood Updated: 03/04/21 0744     Lactate 1.2 mmol/L      Comment: Serial Number: [id number]:  [id number]       Blood Culture - Blood, Arm, Left [[id number]] Collected: 03/04/21 0738    Specimen: Blood from Arm, Left Updated: 03/04/21 0743    COVID-19, [facility]BBOTT IN-HOUSE,NASAL Swab (NO TRANSPORT MEDIA) 2 HR TAT - Swab, Nasopharynx [[id number]]  (Normal) Collected: 03/04/21 0648    Specimen: Swab from Nasopharynx Updated: 03/04/21 0718     COVID19 Presumptive Negative    Narrative:      Fact sheet for providers: https://www.fda.gov/media/721236/download     Fact sheet for patients: https://www.fda.gov/media/087271/download    Test performed by PCR.  If inconsistent with clinical signs and symptoms patient should be tested with different authorized molecular test.    Comprehensive Metabolic Panel [[id number]]  (Abnormal) Collected: 03/04/21 0628    Specimen: Blood Updated: 03/04/21 0701     Glucose 213 mg/dL      BUN 18 mg/dL      Creatinine 1.03 mg/dL      Sodium 137 mmol/L      Potassium 4.0 mmol/L      Comment: Slight hemolysis detected by analyzer. Results may be affected.        Chloride 102 mmol/L      CO2 27.0 mmol/L      Calcium 8.8 mg/dL      Total Protein 6.0 g/dL      Albumin 3.70 g/dL      ALT (SGPT) 22 U/L      AST (SGOT) 28 U/L      Alkaline Phosphatase 120 U/L      Total Bilirubin 0.6 mg/dL      eGFR Non African Amer 75 mL/min/1.73      Globulin 2.3 gm/dL      A/G Ratio 1.6 g/dL      BUN/Creatinine Ratio 17.5     Anion Gap 8.0 mmol/L     Narrative:      GFR Normal >60  Chronic Kidney Disease <60  Kidney Failure <15      Troponin [[id number]]  (Normal) Collected: 03/04/21 0628    Specimen: Blood Updated: 03/04/21 0701     Troponin T <0.010 ng/mL     Narrative:      Troponin T Reference  Range:  <= 0.03 ng/mL-   Negative for AMI  >0.03 ng/mL-     Abnormal for myocardial necrosis.  Clinicians would have to utilize clinical acumen, EKG, Troponin and serial changes to determine if it is an Acute Myocardial Infarction or myocardial injury due to an underlying chronic condition.       Results may be falsely decreased if patient taking Biotin.      BNP [[id number]]  (Abnormal) Collected: 03/04/21 0628    Specimen: Blood Updated: 03/04/21 0656     proBNP 1,000.0 pg/mL     Narrative:      Among patients with dyspnea, NT-proBNP is highly sensitive for the detection of acute congestive heart failure. In addition NT-proBNP of <300 pg/ml effectively rules out acute congestive heart failure with 99% negative predictive value.    Results may be falsely decreased if patient taking Biotin.      Protime-INR [[id number]]  (Abnormal) Collected: 03/04/21 0628    Specimen: Blood Updated: 03/04/21 0645     Protime 12.8 Seconds      INR 1.17    CBC & Differential [[id number]]  (Abnormal) Collected: 03/04/21 0628    Specimen: Blood Updated: 03/04/21 0636    Narrative:      The following orders were created for panel order CBC & Differential.  Procedure                               Abnormality         Status                     ---------                               -----------         ------                     CBC Auto Differential[[id number]]        Abnormal            Final result                 Please view results for these tests on the individual orders.    CBC Auto Differential [[id number]]  (Abnormal) Collected: 03/04/21 0628    Specimen: Blood Updated: 03/04/21 0636     WBC 8.50 10*3/mm3      RBC 4.00 10*6/mm3      Hemoglobin 14.0 g/dL      Hematocrit 40.7 %      .7 fL      MCH 35.0 pg      MCHC 34.4 g/dL      RDW 14.1 %      RDW-SD 50.8 fl      MPV 8.3 fL      Platelets 204 10*3/mm3      Neutrophil % 76.9 %      Lymphocyte % 15.7 %      Monocyte % 4.5 %      Eosinophil % 2.2 %      Basophil % 0.7 %       Neutrophils, Absolute 6.50 10*3/mm3      Lymphocytes, Absolute 1.30 10*3/mm3      Monocytes, Absolute 0.40 10*3/mm3      Eosinophils, Absolute 0.20 10*3/mm3      Basophils, Absolute 0.10 10*3/mm3      nRBC 0.0 /100 WBC           Imaging Results (Last 24 Hours)     Procedure Component Value Units Date/Time    XR Chest 1 View [[id number]] Collected: 03/04/21 0715     Updated: 03/04/21 0719    Narrative:      DATE OF EXAM:  3/4/2021 7:03 AM     PROCEDURE:  XR CHEST 1 VW-     INDICATIONS:  Cough. Evaluate for Covid. Shortness of breath.     COMPARISON:  Chest radiograph 3/16/2019     TECHNIQUE:   Single radiographic view of the chest was obtained.     FINDINGS:  Lungs are hyperexpanded. There is a background emphysema. There is a  single lead transvenous pacemaker/AICD device. There is mild  cardiomegaly. There has been development of airspace opacities in the  right lower lobe right hilar location right upper lobe. There is more  patchy groundglass in the left lung. No definite pleural effusion.       Impression:      Multifocal airspace opacities in the right lung. Groundglass opacity in  the left lower lobe. This could be seen with multifocal pneumonia.  Underlying metastatic disease or malignancy cannot be excluded.  Continued imaging and clinical follow-up is recommended.  Background of emphysema is suspected.     Electronically Signed By-Antonia Gary MD On:3/4/2021 7:17 AM  This report was finalized on [id number] by  Antonia Gary MD.      LAB RESULTS (LAST 7 DAYS)    CBC  Results from last 7 days   Lab Units 03/04/21  0628   WBC 10*3/mm3 8.50   RBC 10*6/mm3 4.00*   HEMOGLOBIN g/dL 14.0   HEMATOCRIT % 40.7   MCV fL 101.7*   PLATELETS 10*3/mm3 204       BMP  Results from last 7 days   Lab Units 03/04/21 0628   SODIUM mmol/L 137   POTASSIUM mmol/L 4.0   CHLORIDE mmol/L 102   CO2 mmol/L 27.0   BUN mg/dL 18   CREATININE mg/dL 1.03   GLUCOSE mg/dL 213*       CMP   Results from last 7 days   Lab Units 03/04/21 0628    SODIUM mmol/L 137   POTASSIUM mmol/L 4.0   CHLORIDE mmol/L 102   CO2 mmol/L 27.0   BUN mg/dL 18   CREATININE mg/dL 1.03   GLUCOSE mg/dL 213*   ALBUMIN g/dL 3.70   BILIRUBIN mg/dL 0.6   ALK PHOS U/L 120*   AST (SGOT) U/L 28   ALT (SGPT) U/L 22         BNP        TROPONIN  Results from last 7 days   Lab Units 03/04/21  0628   TROPONIN T ng/mL <0.010       CoAg  Results from last 7 days   Lab Units 03/04/21  0628   INR  1.17*       Creatinine Clearance  Estimated Creatinine Clearance: 126.4 mL/min (by C-G formula based on SCr of 1.03 mg/dL).    ABG        Radiology  Xr Chest 1 View    Result Date: 3/4/2021  Multifocal airspace opacities in the right lung. Groundglass opacity in the left lower lobe. This could be seen with multifocal pneumonia. Underlying metastatic disease or malignancy cannot be excluded. Continued imaging and clinical follow-up is recommended. Background of emphysema is suspected.  Electronically Signed By-Antonia Gary MD On:3/4/2021 7:17 AM This report was finalized on [id number] by  Antonia Gary MD.              EKG          I personally viewed and interpreted the patient's EKG/Telemetry data: Atrial fibrillation with rapid ventricular response    ECHOCARDIOGRAM:    Results for orders placed during the hospital encounter of 03/11/18   Adult Transesophageal Echo (ARIA) W/ Cont if Necessary Per Protocol    Narrative · The left ventricular cavity is moderately dilated.  · There is akinesis of the septum and inferior wall  · Estimated EF appears to be in the range of 26 - 30%.  · Normal right ventricular cavity size and systolic function noted.  · Left atrial cavity size is mildly dilated.  · There is a small mobile echodensity at the apex of the left atrial   appendage which is concerning for a small thrombus..  · Saline test results are negative  · Mild mitral valve regurgitation is present.  · There are no significant plaques in the aorta..  · There is no evidence of pericardial effusion.     A  two-dimensional transesophageal echocardiogram was performed.  Complete   color flow velocity mapping and Doppler interrogation was performed.                 Cardiolite (Tc-99m Sestamibi) stress test      OTHER:     Assessment/Plan     Active Problems:    Pneumonia of right lower lobe due to infectious organism    ]]]]]]]]]]]]]]]]]]]   impression  ==========  -Cough shortness of breath.    -Chronic atrial fibrillation-RVR.  Patient admitted to using meth.  Positive screening.    -Anticoagulation.  Patient was supposed to be on Coumadin but his INR is low.  Not sure he is taking the Coumadin.    -   Status postcardiac arrest with ventricular fibrillation probably related to cocaine and cardiomyopathy March of 2018     -status post single-chamber ICD ([employer] ) March 2018 by Dr. Jauregui at [facility] .  VVI at 40 per minute.  Status post ICD shock x3 March 2019 due to atrial fibrillation with a rapid ventricular response.  Status post ICD shock due to atrial fibrillation with rapid ventricular response 1/24/2020     -Nonischemic cardiomyopathy probably secondary to cocaine use  Cardiac catheterization 03/15/2018 -nonobstructive disease - Dr. Moya at [facility]     -status post ICD 03/16/2018     - chronic atrial fibrillation on Coumadin.     -hypertension dyslipidemia diabetes peripheral neuropathy     -status post arthroscopic knee surgery     -nonsmoker     -Family history is positive for coronary artery disease     -allergic to penicillin.  =========  Plan  =============  Patient presented with cough shortness of breath and atrial fibrillation which is chronic with RVR.  Patient was supposed to be taking Coumadin but his INR is very low.  Rate is not well controlled with intravenous Cardizem.  Patient was given intravenous digoxin since his blood pressure is somewhat borderline  Patient was given Lanoxin 0.5 mg intravenously followed by another dose of 0.25 mg 4 hours from the  first loading dose.  Patient did not have any ICD shocks.  Patient is not having any angina pectoris or congestive heart failure.  Patient did not have any further ICD shocks.  Recent interrogation of the ICD reveal excellent pacing parameters.  Medications were reviewed and updated.  Patient was supposed to be Cardizem CD to 240 mg a day and continue Coreg 25 mg p.o. twice daily as an outpatient..  Will adjust medications for rate control and discussed with patient regarding anticoagulation and importance of being on anticoagulation.  Further plan will depend on patient's progress.  ]]]]]]]]]]]]]]]]]]          Iliana Rodrigez MD  03/04/21  10:26 EST

## 2021-03-04 NOTE — H&P
Patient Care Team:  Lluvia Sarabia APRN as PCP - Josie Winter MD as Consulting Physician (Endocrinology)  Iliana Rodrigez MD as Consulting Physician (Cardiology)    Chief complaint Cough, SOA    Subjective     Patient is a 55 y.o. male presented to the ER complaining of cough starting yesterday. He reports he had muscle aches and pains yesterday and felt tired.  He states he awoke in the middle the night having palpitations and had exertional dyspnea.  He reports his cough is been really productive of some yellowish sputum this morning he reports no hemoptysis.  Reports no leg pain or swelling.  He states he takes Coumadin but has not recently has a level checked.  He has has not had any discharge from his [facility].  The patient reports that he missed Lasix yesterday and has a little bit of swelling in his feet. He was found to be in Afib with RVR and PNA and admitted. When I saw him he was sleeping, arouses easily. Still with occ cough. I questioned him about his positive UDS for amphetamines/meth and he admitted to using meth. He has a Hx of previous drug use that led to cardiac arrest several yrs ago. He denies chest pain, HA, ST. abd pain or N/V  Review of Systems   Pertinent items are noted in HPI, all other systems reviewed and negative    History  Past Medical History:   Diagnosis Date   • Acute respiratory failure (CMS/HCC)    • Arrhythmia    • Atrial fibrillation (CMS/HCC)    • Cardiac arrest with ventricular fibrillation (CMS/HCC)    • Cardiomyopathy (CMS/HCC)    • Diabetes mellitus (CMS/HCC)    • Diabetes mellitus type I (CMS/HCC)    • Hyperlipidemia    • Hypertension    • Peripheral neuropathy    • Pneumonia    • Sleep apnea    • Substance abuse (CMS/HCC)      Past Surgical History:   Procedure Laterality Date   • CARDIAC CATHETERIZATION N/A 3/15/2018    Procedure: Left Heart Cath and right heart cath;  Surgeon: Valentin Moya MD;  Location: [facility] CATH INVASIVE LOCATION;  Service:  Cardiovascular   • CARDIAC ELECTROPHYSIOLOGY PROCEDURE N/A 3/16/2018    Procedure: Device Implant-[place]    ICD;  Surgeon: Robert Jauregui MD;  Location:  [facility] CATH INVASIVE LOCATION;  Service: Cardiovascular   • CARDIAC ELECTROPHYSIOLOGY PROCEDURE N/A 2018    Procedure: NIPS  Threshold test of his ICD-[place]erman  Needs to be scheduled once he has had therapeutic INR's for 3-4 weeks in a row.;  Surgeon: Robert Jauregui MD;  Location:  [facility] CATH INVASIVE LOCATION;  Service: Cardiology   • CARDIAC ELECTROPHYSIOLOGY PROCEDURE N/A 2018    Procedure: Cardioversion;  Surgeon: Robert Jauregui MD;  Location:  [facility] CATH INVASIVE LOCATION;  Service: Cardiology   • COLONOSCOPY     • INSERT / REPLACE / REMOVE PACEMAKER      ICD [place] PLACED 3/16/18   • KNEE ARTHROSCOPY Bilateral      Family History   Problem Relation Age of Onset   • Alcohol abuse Maternal Aunt    • No Known Problems Mother    • Heart disease Father    • Atrial fibrillation Father    • No Known Problems Maternal Grandmother    • No Known Problems Maternal Grandfather    • No Known Problems Paternal Grandmother    • No Known Problems Paternal Grandfather      Social History     Tobacco Use   • Smoking status: Former Smoker     Years: 4.00     Types: Cigarettes     Quit date:      Years since quittin.1   • Smokeless tobacco: Never Used   Substance Use Topics   • Alcohol use: Yes     Alcohol/week: 2.0 standard drinks     Types: 2 Cans of beer per week     Comment: NO DRINKING SINCE 3/2018, HX DRINKING  ETOH   • Drug use: Yes     Types: Cocaine(coke)     Comment: LAST USED IN 2018     Allergies:  Penicillins    Objective     Vital Signs  Temp:  [97.6 °F (36.4 °C)] 97.6 °F (36.4 °C)  Heart Rate:  [] 144  Resp:  [16-24] 18  BP: (109-162)/() 109/65      Physical Exam:      General Appearance:    Sleepy, arouses easily, cooperative, in no acute distress   Head:    Normocephalic, without obvious abnormality, atraumatic   Eyes:             Lids and lashes normal, conjunctivae and sclerae normal, no   icterus, no pallor   Ears:    Ears appear intact with no abnormalities noted   Throat:   No oral lesions, no thrush, oral mucosa moist   Neck:   No adenopathy, supple, trachea midline, no thyromegaly, no   carotid bruit, no JVD   Back:     No kyphosis present, no scoliosis present, no skin lesions,      erythema or scars, no tenderness to percussion or                   palpation,   range of motion normal   Lungs:     Clear to auscultation upper lobes, ft crackles lower lobes,respirations regular, even and  unlabored    Heart:    Irregularly irregular rhythm and normal rate, normal S1 and S2, no murmur, no gallop, no rub, no click   Chest Wall:    No abnormalities observed   Abdomen:     Normal bowel sounds, no masses, no organomegaly, soft        non-tender, non-distended, no guarding, no rebound                tenderness   Rectal:     Deferred   Extremities:   Moves all extremities well, no edema, no cyanosis, no             redness   Pulses:   Pulses palpable and equal bilaterally   Skin:   No bleeding, bruising or rash   Lymph nodes:   No palpable adenopathy   Neurologic:   Cranial nerves 2 - 12 grossly intact, sensation intact       Results Review:    I reviewed the patient's new clinical results.  I reviewed the patient's new imaging results and agree with the interpretation.    Assessment/Plan       Pneumonia of right lower lobe due to infectious organism  1. PNA-IV antibiotics, Mucinex, hold duonebs as HR >150  2. Afib with RVR-Cardiology consulting and he has received IV meds with only slight improvement  3. Dyspnea-Check Ddimer as his INR is very low, rule out PE  4. DM-BS elevated-add SS insulin coverage  5. Illegal Drug Use-Meth-counseled, will have SS consult for rehab/Tx  6. CAD-Consult cardiology    Expected Length of Stay 2-3days    I discussed the patients findings and my recommendations with patient and nursing staff.     Lluvia CHRISTY  JESUSITA Sarabia  03/04/21  10:48 EST

## 2021-03-04 NOTE — PLAN OF CARE
Problem: Adult Inpatient Plan of Care  Goal: Plan of Care Review  Outcome: Ongoing, Progressing  Flowsheets (Taken 3/4/2021 4479)  Progress: no change  Plan of Care Reviewed With: patient  Outcome Summary: new admit  Goal: Patient-Specific Goal (Individualized)  Outcome: Ongoing, Progressing  Goal: Absence of Hospital-Acquired Illness or Injury  Outcome: Ongoing, Progressing  Goal: Optimal Comfort and Wellbeing  Outcome: Ongoing, Progressing  Goal: Readiness for Transition of Care  Outcome: Ongoing, Progressing   Goal Outcome Evaluation:  Plan of Care Reviewed With: patient  Progress: no change  Outcome Summary: new admit

## 2021-03-04 NOTE — ED NOTES
Pt . Dr. Rodrigez and Giovanny notified. Per Dr. Rodrigez may give Lanoxin 0.5 mg IV followed by another 0.25 mg IV in 4 hours if heart rate is still more than 120/min.        Jenna Srivastava, RN  03/04/21 [id number]

## 2021-03-04 NOTE — ED NOTES
Pt stated he began having SOA yesterday after work. Pt stated he went to sleep last, woke up this morning around 0400 and the SOA was worse. Pt stated has also developed a cough this morning. Pt has a defibrillator that was placed March of 2018 d/t cardiac arrest. Pt stated he's normally in A-Fib but presents today in a-fib w/RVR.     Pt has swelling in both lower extremities.Pt stated he normally takes lasix but didn't yesterday.     Abelino Stone, RN  03/04/21 0617       Abelino Stone, RN  03/04/21 0621

## 2021-03-05 ENCOUNTER — APPOINTMENT (OUTPATIENT)
Dept: CARDIOLOGY | Facility: HOSPITAL | Age: 56
End: 2021-03-05

## 2021-03-05 LAB
ALBUMIN SERPL-MCNC: 3.4 G/DL (ref 3.5–5.2)
ALBUMIN/GLOB SERPL: 1.5 G/DL
ALP SERPL-CCNC: 102 U/L (ref 39–117)
ALT SERPL W P-5'-P-CCNC: 16 U/L (ref 1–41)
ANION GAP SERPL CALCULATED.3IONS-SCNC: 8 MMOL/L (ref 5–15)
AST SERPL-CCNC: 15 U/L (ref 1–40)
BASOPHILS # BLD AUTO: 0.1 10*3/MM3 (ref 0–0.2)
BASOPHILS NFR BLD AUTO: 0.8 % (ref 0–1.5)
BH CV ECHO MEAS - ACS: 2.8 CM
BH CV ECHO MEAS - AO MAX PG (FULL): 1.3 MMHG
BH CV ECHO MEAS - AO MAX PG: 6.9 MMHG
BH CV ECHO MEAS - AO MEAN PG (FULL): 0.69 MMHG
BH CV ECHO MEAS - AO MEAN PG: 4.3 MMHG
BH CV ECHO MEAS - AO ROOT AREA (BSA CORRECTED): 1.4
BH CV ECHO MEAS - AO ROOT AREA: 10.2 CM^2
BH CV ECHO MEAS - AO ROOT DIAM: 3.6 CM
BH CV ECHO MEAS - AO V2 MAX: 131.1 CM/SEC
BH CV ECHO MEAS - AO V2 MEAN: 100.6 CM/SEC
BH CV ECHO MEAS - AO V2 VTI: 23.4 CM
BH CV ECHO MEAS - AORTIC HR: 96.7 BPM
BH CV ECHO MEAS - AORTIC R-R: 0.62 SEC
BH CV ECHO MEAS - AVA(I,A): 4.5 CM^2
BH CV ECHO MEAS - AVA(I,D): 4.5 CM^2
BH CV ECHO MEAS - AVA(V,A): 4.4 CM^2
BH CV ECHO MEAS - AVA(V,D): 4.4 CM^2
BH CV ECHO MEAS - BSA(HAYCOCK): 2.7 M^2
BH CV ECHO MEAS - BSA: 2.6 M^2
BH CV ECHO MEAS - BZI_BMI: 31.9 KILOGRAMS/M^2
BH CV ECHO MEAS - BZI_METRIC_HEIGHT: 198.1 CM
BH CV ECHO MEAS - BZI_METRIC_WEIGHT: 125.2 KG
BH CV ECHO MEAS - CI(AO): 8.9 L/MIN/M^2
BH CV ECHO MEAS - CI(LVOT): 3.9 L/MIN/M^2
BH CV ECHO MEAS - CO(AO): 23.1 L/MIN
BH CV ECHO MEAS - CO(LVOT): 10.2 L/MIN
BH CV ECHO MEAS - EDV(CUBED): 171.5 ML
BH CV ECHO MEAS - EDV(MOD-SP4): 251.9 ML
BH CV ECHO MEAS - EDV(TEICH): 150.9 ML
BH CV ECHO MEAS - EF(CUBED): 11.1 %
BH CV ECHO MEAS - EF(MOD-BP): 46 %
BH CV ECHO MEAS - EF(MOD-SP4): 46.3 %
BH CV ECHO MEAS - EF(TEICH): 8.7 %
BH CV ECHO MEAS - ESV(CUBED): 152.4 ML
BH CV ECHO MEAS - ESV(MOD-SP4): 135.3 ML
BH CV ECHO MEAS - ESV(TEICH): 137.8 ML
BH CV ECHO MEAS - FS: 3.9 %
BH CV ECHO MEAS - IVS/LVPW: 0.92
BH CV ECHO MEAS - IVSD: 1.4 CM
BH CV ECHO MEAS - LA DIMENSION(2D): 5.8 CM
BH CV ECHO MEAS - LV DIASTOLIC VOL/BSA (35-75): 97.3 ML/M^2
BH CV ECHO MEAS - LV MASS(C)D: 349.6 GRAMS
BH CV ECHO MEAS - LV MASS(C)DI: 135 GRAMS/M^2
BH CV ECHO MEAS - LV MAX PG: 5.6 MMHG
BH CV ECHO MEAS - LV MEAN PG: 3.6 MMHG
BH CV ECHO MEAS - LV SYSTOLIC VOL/BSA (12-30): 52.3 ML/M^2
BH CV ECHO MEAS - LV V1 MAX: 118.3 CM/SEC
BH CV ECHO MEAS - LV V1 MEAN: 91.3 CM/SEC
BH CV ECHO MEAS - LV V1 VTI: 21.5 CM
BH CV ECHO MEAS - LVIDD: 5.6 CM
BH CV ECHO MEAS - LVIDS: 5.3 CM
BH CV ECHO MEAS - LVOT AREA: 4.9 CM^2
BH CV ECHO MEAS - LVOT DIAM: 2.5 CM
BH CV ECHO MEAS - LVPWD: 1.5 CM
BH CV ECHO MEAS - MR MAX PG: 49.2 MMHG
BH CV ECHO MEAS - MR MAX VEL: 350.8 CM/SEC
BH CV ECHO MEAS - MV DEC TIME: 0.19 SEC
BH CV ECHO MEAS - MV E MAX VEL: 147.6 CM/SEC
BH CV ECHO MEAS - MV MAX PG: 12.4 MMHG
BH CV ECHO MEAS - MV MEAN PG: 4.1 MMHG
BH CV ECHO MEAS - MV V2 MAX: 176.2 CM/SEC
BH CV ECHO MEAS - MV V2 MEAN: 90 CM/SEC
BH CV ECHO MEAS - MV V2 VTI: 33.6 CM
BH CV ECHO MEAS - MVA(VTI): 3.1 CM^2
BH CV ECHO MEAS - PA MAX PG (FULL): 2.7 MMHG
BH CV ECHO MEAS - PA MAX PG: 5.6 MMHG
BH CV ECHO MEAS - PA V2 MAX: 117.7 CM/SEC
BH CV ECHO MEAS - PVA(V,A): 2.3 CM^2
BH CV ECHO MEAS - PVA(V,D): 2.3 CM^2
BH CV ECHO MEAS - QP/QS: 0.54
BH CV ECHO MEAS - RAP SYSTOLE: 3 MMHG
BH CV ECHO MEAS - RV MAX PG: 2.9 MMHG
BH CV ECHO MEAS - RV MEAN PG: 1.7 MMHG
BH CV ECHO MEAS - RV V1 MAX: 84.8 CM/SEC
BH CV ECHO MEAS - RV V1 MEAN: 60.2 CM/SEC
BH CV ECHO MEAS - RV V1 VTI: 17.7 CM
BH CV ECHO MEAS - RVDD: 3.8 CM
BH CV ECHO MEAS - RVOT AREA: 3.2 CM^2
BH CV ECHO MEAS - RVOT DIAM: 2 CM
BH CV ECHO MEAS - RVSP: 31.6 MMHG
BH CV ECHO MEAS - SI(AO): 92.3 ML/M^2
BH CV ECHO MEAS - SI(CUBED): 7.4 ML/M^2
BH CV ECHO MEAS - SI(LVOT): 40.7 ML/M^2
BH CV ECHO MEAS - SI(MOD-SP4): 45 ML/M^2
BH CV ECHO MEAS - SI(TEICH): 5.1 ML/M^2
BH CV ECHO MEAS - SV(AO): 238.8 ML
BH CV ECHO MEAS - SV(CUBED): 19.1 ML
BH CV ECHO MEAS - SV(LVOT): 105.4 ML
BH CV ECHO MEAS - SV(MOD-SP4): 116.6 ML
BH CV ECHO MEAS - SV(RVOT): 56.9 ML
BH CV ECHO MEAS - SV(TEICH): 13.1 ML
BH CV ECHO MEAS - TR MAX VEL: 266.4 CM/SEC
BILIRUB SERPL-MCNC: 1.4 MG/DL (ref 0–1.2)
BUN SERPL-MCNC: 15 MG/DL (ref 6–20)
BUN/CREAT SERPL: 18.8 (ref 7–25)
CALCIUM SPEC-SCNC: 8.7 MG/DL (ref 8.6–10.5)
CHLORIDE SERPL-SCNC: 102 MMOL/L (ref 98–107)
CO2 SERPL-SCNC: 26 MMOL/L (ref 22–29)
CREAT SERPL-MCNC: 0.8 MG/DL (ref 0.76–1.27)
DEPRECATED RDW RBC AUTO: 49.9 FL (ref 37–54)
EOSINOPHIL # BLD AUTO: 0.2 10*3/MM3 (ref 0–0.4)
EOSINOPHIL NFR BLD AUTO: 2.9 % (ref 0.3–6.2)
ERYTHROCYTE [DISTWIDTH] IN BLOOD BY AUTOMATED COUNT: 14.1 % (ref 12.3–15.4)
GFR SERPL CREATININE-BSD FRML MDRD: 101 ML/MIN/1.73
GLOBULIN UR ELPH-MCNC: 2.2 GM/DL
GLUCOSE BLDC GLUCOMTR-MCNC: 220 MG/DL (ref 70–105)
GLUCOSE BLDC GLUCOMTR-MCNC: 293 MG/DL (ref 70–105)
GLUCOSE BLDC GLUCOMTR-MCNC: 302 MG/DL (ref 70–105)
GLUCOSE BLDC GLUCOMTR-MCNC: 310 MG/DL (ref 70–105)
GLUCOSE SERPL-MCNC: 236 MG/DL (ref 65–99)
HBA1C MFR BLD: 10.4 % (ref 3.5–5.6)
HCT VFR BLD AUTO: 39.7 % (ref 37.5–51)
HGB BLD-MCNC: 13.9 G/DL (ref 13–17.7)
INR PPP: 1.05 (ref 2–3)
LV EF 2D ECHO EST: 35 %
LYMPHOCYTES # BLD AUTO: 1.3 10*3/MM3 (ref 0.7–3.1)
LYMPHOCYTES NFR BLD AUTO: 15.8 % (ref 19.6–45.3)
MCH RBC QN AUTO: 34.9 PG (ref 26.6–33)
MCHC RBC AUTO-ENTMCNC: 34.9 G/DL (ref 31.5–35.7)
MCV RBC AUTO: 100.1 FL (ref 79–97)
MONOCYTES # BLD AUTO: 0.7 10*3/MM3 (ref 0.1–0.9)
MONOCYTES NFR BLD AUTO: 8.8 % (ref 5–12)
NEUTROPHILS NFR BLD AUTO: 5.7 10*3/MM3 (ref 1.7–7)
NEUTROPHILS NFR BLD AUTO: 71.7 % (ref 42.7–76)
NRBC BLD AUTO-RTO: 0 /100 WBC (ref 0–0.2)
NT-PROBNP SERPL-MCNC: 1007 PG/ML (ref 0–900)
PLATELET # BLD AUTO: 191 10*3/MM3 (ref 140–450)
PMV BLD AUTO: 8.2 FL (ref 6–12)
POTASSIUM SERPL-SCNC: 4 MMOL/L (ref 3.5–5.2)
PROT SERPL-MCNC: 5.6 G/DL (ref 6–8.5)
PROTHROMBIN TIME: 11.5 SECONDS (ref 19.4–28.5)
RBC # BLD AUTO: 3.97 10*6/MM3 (ref 4.14–5.8)
SODIUM SERPL-SCNC: 136 MMOL/L (ref 136–145)
TROPONIN T SERPL-MCNC: <0.01 NG/ML (ref 0–0.03)
WBC # BLD AUTO: 8 10*3/MM3 (ref 3.4–10.8)

## 2021-03-05 PROCEDURE — 83880 ASSAY OF NATRIURETIC PEPTIDE: CPT | Performed by: NURSE PRACTITIONER

## 2021-03-05 PROCEDURE — 84484 ASSAY OF TROPONIN QUANT: CPT | Performed by: NURSE PRACTITIONER

## 2021-03-05 PROCEDURE — 80053 COMPREHEN METABOLIC PANEL: CPT | Performed by: NURSE PRACTITIONER

## 2021-03-05 PROCEDURE — 82962 GLUCOSE BLOOD TEST: CPT

## 2021-03-05 PROCEDURE — 63710000001 INSULIN LISPRO (HUMAN) PER 5 UNITS: Performed by: NURSE PRACTITIONER

## 2021-03-05 PROCEDURE — G0378 HOSPITAL OBSERVATION PER HR: HCPCS

## 2021-03-05 PROCEDURE — 63710000001 INSULIN GLARGINE PER 5 UNITS: Performed by: NURSE PRACTITIONER

## 2021-03-05 PROCEDURE — 85025 COMPLETE CBC W/AUTO DIFF WBC: CPT | Performed by: NURSE PRACTITIONER

## 2021-03-05 PROCEDURE — 96376 TX/PRO/DX INJ SAME DRUG ADON: CPT

## 2021-03-05 PROCEDURE — 85610 PROTHROMBIN TIME: CPT | Performed by: INTERNAL MEDICINE

## 2021-03-05 PROCEDURE — 96366 THER/PROPH/DIAG IV INF ADDON: CPT

## 2021-03-05 PROCEDURE — 83036 HEMOGLOBIN GLYCOSYLATED A1C: CPT | Performed by: NURSE PRACTITIONER

## 2021-03-05 PROCEDURE — 93306 TTE W/DOPPLER COMPLETE: CPT | Performed by: INTERNAL MEDICINE

## 2021-03-05 PROCEDURE — 25010000002 CEFTRIAXONE PER 250 MG: Performed by: EMERGENCY MEDICINE

## 2021-03-05 PROCEDURE — 99214 OFFICE O/P EST MOD 30 MIN: CPT | Performed by: INTERNAL MEDICINE

## 2021-03-05 PROCEDURE — 93306 TTE W/DOPPLER COMPLETE: CPT

## 2021-03-05 RX ORDER — DIGOXIN 125 MCG
250 TABLET ORAL
Status: DISCONTINUED | OUTPATIENT
Start: 2021-03-06 | End: 2021-03-06 | Stop reason: HOSPADM

## 2021-03-05 RX ORDER — INSULIN LISPRO 100 [IU]/ML
10 INJECTION, SOLUTION INTRAVENOUS; SUBCUTANEOUS
Status: DISCONTINUED | OUTPATIENT
Start: 2021-03-05 | End: 2021-03-06 | Stop reason: HOSPADM

## 2021-03-05 RX ORDER — DILTIAZEM HYDROCHLORIDE 180 MG/1
180 CAPSULE, COATED, EXTENDED RELEASE ORAL
Status: DISCONTINUED | OUTPATIENT
Start: 2021-03-05 | End: 2021-03-06 | Stop reason: HOSPADM

## 2021-03-05 RX ORDER — WARFARIN SODIUM 7.5 MG/1
7.5 TABLET ORAL
Status: DISCONTINUED | OUTPATIENT
Start: 2021-03-07 | End: 2021-03-06 | Stop reason: HOSPADM

## 2021-03-05 RX ORDER — WARFARIN SODIUM 5 MG/1
10 TABLET ORAL
Status: DISCONTINUED | OUTPATIENT
Start: 2021-03-05 | End: 2021-03-06 | Stop reason: HOSPADM

## 2021-03-05 RX ADMIN — AZITHROMYCIN DIHYDRATE 500 MG: 250 TABLET, FILM COATED ORAL at 08:04

## 2021-03-05 RX ADMIN — VALSARTAN 40 MG: 80 TABLET, FILM COATED ORAL at 08:04

## 2021-03-05 RX ADMIN — ACETAMINOPHEN 650 MG: 325 TABLET ORAL at 20:33

## 2021-03-05 RX ADMIN — WARFARIN 10 MG: 5 TABLET ORAL at 17:11

## 2021-03-05 RX ADMIN — CARVEDILOL 25 MG: 25 TABLET, FILM COATED ORAL at 20:33

## 2021-03-05 RX ADMIN — INSULIN LISPRO 5 UNITS: 100 INJECTION, SOLUTION INTRAVENOUS; SUBCUTANEOUS at 11:46

## 2021-03-05 RX ADMIN — CEFTRIAXONE SODIUM 2 G: 10 INJECTION, POWDER, FOR SOLUTION INTRAVENOUS at 08:04

## 2021-03-05 RX ADMIN — CARVEDILOL 25 MG: 25 TABLET, FILM COATED ORAL at 08:04

## 2021-03-05 RX ADMIN — GUAIFENESIN 1200 MG: 600 TABLET, EXTENDED RELEASE ORAL at 20:33

## 2021-03-05 RX ADMIN — INSULIN GLARGINE 32 UNITS: 100 INJECTION, SOLUTION SUBCUTANEOUS at 20:34

## 2021-03-05 RX ADMIN — INSULIN LISPRO 3 UNITS: 100 INJECTION, SOLUTION INTRAVENOUS; SUBCUTANEOUS at 08:04

## 2021-03-05 RX ADMIN — DILTIAZEM HYDROCHLORIDE 180 MG: 180 CAPSULE, COATED, EXTENDED RELEASE ORAL at 15:47

## 2021-03-05 RX ADMIN — INSULIN LISPRO 5 UNITS: 100 INJECTION, SOLUTION INTRAVENOUS; SUBCUTANEOUS at 17:13

## 2021-03-05 RX ADMIN — GUAIFENESIN 1200 MG: 600 TABLET, EXTENDED RELEASE ORAL at 08:04

## 2021-03-05 RX ADMIN — SODIUM CHLORIDE 10 MG/HR: 900 INJECTION, SOLUTION INTRAVENOUS at 05:46

## 2021-03-05 RX ADMIN — INSULIN LISPRO 10 UNITS: 100 INJECTION, SOLUTION INTRAVENOUS; SUBCUTANEOUS at 17:11

## 2021-03-05 RX ADMIN — ATORVASTATIN CALCIUM 40 MG: 40 TABLET, FILM COATED ORAL at 20:33

## 2021-03-05 RX ADMIN — METHIMAZOLE 10 MG: 5 TABLET ORAL at 08:04

## 2021-03-05 NOTE — PLAN OF CARE
Goal Outcome Evaluation:  Plan of Care Reviewed With: patient  Progress: no change  Outcome Summary: Pt. continues to be in AFIB with cardizem gtt at 10mg/hr. Pt. voices no complaints of pain or discomfort at this time. Will monitor closely.

## 2021-03-05 NOTE — PROGRESS NOTES
Referring Provider: Gino Baltazar MD    Reason for follow-up:  Atrial fibrillation  Status post ICD     Patient Care Team:  Lluvia Sarabia APRN as PCP - Josie Wniter MD as Consulting Physician (Endocrinology)  Iliana Rodrigez MD as Consulting Physician (Cardiology)    Subjective .  Feeling better     ROS    Since I have last seen, the patient has been without any chest discomfort ,shortness of breath, palpitations, dizziness or syncope.  Denies having any headache ,abdominal pain ,nausea, vomiting , diarrhea constipation, loss of weight or loss of appetite.  Denies having any excessive bruising ,hematuria or blood in the stool.    Review of all systems negative except as indicated    History  Past Medical History:   Diagnosis Date   • Acute respiratory failure (CMS/HCC)    • Arrhythmia    • Atrial fibrillation (CMS/HCC)    • Cardiac arrest with ventricular fibrillation (CMS/HCC)    • Cardiomyopathy (CMS/HCC)    • Diabetes mellitus (CMS/HCC)    • Diabetes mellitus type I (CMS/HCC)    • Hyperlipidemia    • Hypertension    • Peripheral neuropathy    • Pneumonia    • Sleep apnea    • Substance abuse (CMS/HCC)        Past Surgical History:   Procedure Laterality Date   • CARDIAC CATHETERIZATION N/A 3/15/2018    Procedure: Left Heart Cath and right heart cath;  Surgeon: Valentin Moya MD;  Location: [facility] INVASIVE LOCATION;  Service: Cardiovascular   • CARDIAC ELECTROPHYSIOLOGY PROCEDURE N/A 3/16/2018    Procedure: Device Implant-[place]    ICD;  Surgeon: Robert Jauregui MD;  Location: [facility] INVASIVE LOCATION;  Service: Cardiovascular   • CARDIAC ELECTROPHYSIOLOGY PROCEDURE N/A 5/17/2018    Procedure: NIPS  Threshold test of his ICD-[place]  Needs to be scheduled once he has had therapeutic INR's for 3-4 weeks in a row.;  Surgeon: Robert Jauregui MD;  Location: [facility] INVASIVE LOCATION;  Service: Cardiology   • CARDIAC ELECTROPHYSIOLOGY PROCEDURE N/A 5/17/2018    Procedure:  Cardioversion;  Surgeon: Robert Jauregui MD;  Location: [facility] INVASIVE LOCATION;  Service: Cardiology   • COLONOSCOPY     • INSERT / REPLACE / REMOVE PACEMAKER      ICD [place] PLACED 3/16/18   • KNEE ARTHROSCOPY Bilateral        Family History   Problem Relation Age of Onset   • Alcohol abuse Maternal Aunt    • No Known Problems Mother    • Heart disease Father    • Atrial fibrillation Father    • No Known Problems Maternal Grandmother    • No Known Problems Maternal Grandfather    • No Known Problems Paternal Grandmother    • No Known Problems Paternal Grandfather        Social History     Tobacco Use   • Smoking status: Former Smoker     Years: 4.00     Types: Cigarettes     Quit date:      Years since quittin.1   • Smokeless tobacco: Never Used   Substance Use Topics   • Alcohol use: Yes     Alcohol/week: 2.0 standard drinks     Types: 2 Cans of beer per week     Comment: occassionally   • Drug use: Yes     Types: Cocaine(coke)     Comment: meth about 4 days ago        Medications Prior to Admission   Medication Sig Dispense Refill Last Dose   • atorvastatin (LIPITOR) 40 MG tablet Take 40 mg by mouth Daily.   3/3/2021 at Unknown time   • carvedilol (COREG) 25 MG tablet Take 1 tablet by mouth 2 (Two) Times a Day. 60 tablet 0 3/3/2021 at Unknown time   • furosemide (LASIX) 40 MG tablet TAKE 1 TABLET BY MOUTH EVERY DAY 90 tablet 3 3/3/2021 at Unknown time   • HUMALOG KWIKPEN 100 UNIT/ML solution pen-injector INJECT 1 UNIT:10 G CARB WITH MEALS 3X/DAY, AND 1 UNIT:15 G CARB WITH SNACKS, MAX OF 70 UNITS/DAY 75 mL 2    • Insulin Glargine (BASAGLAR KWIKPEN) 100 UNIT/ML injection pen Inject 39 Units under the skin into the appropriate area as directed Every Night.   Patient Taking Differently at Unknown time   • methIMAzole (TAPAZOLE) 10 MG tablet Take one tab daily.   3/3/2021 at Unknown time   • valsartan (DIOVAN) 40 MG tablet TAKE 1 TABLET BY MOUTH EVERY DAY 90 tablet 3 3/3/2021 at Unknown time   • vitamin  "D (ERGOCALCIFEROL) 1.25 MG (69856 UT) capsule capsule Take 1 capsule by mouth 1 (One) Time Per Week. 12 capsule 3 3/3/2021 at Unknown time   • warfarin (COUMADIN) 5 MG tablet Take 7.5 mg by mouth 3 (Three) Times a Week. Sun,Tue,Thurs at bed   3/2/2021   • warfarin (COUMADIN) 5 MG tablet Take 10 mg by mouth 4 (Four) Times a Week. Mon,Wed,Fri,Sat in the evening   3/3/2021   • dilTIAZem LA (Matzim LA) 180 MG 24 hr tablet Take 1 tablet by mouth Daily. (Patient not taking: Reported on 3/4/2021) 30 tablet 0 Not Taking at Unknown time       Allergies  Penicillins    Scheduled Meds:atorvastatin, 40 mg, Oral, Nightly  azithromycin, 500 mg, Oral, Q24H  carvedilol, 25 mg, Oral, Q12H  cefTRIAXone, 2 g, Intravenous, Q24H  furosemide, 40 mg, Oral, Daily  guaiFENesin, 1,200 mg, Oral, Q12H  insulin glargine, 32 Units, Subcutaneous, Nightly  insulin lispro, 0-7 Units, Subcutaneous, TID AC  methIMAzole, 10 mg, Oral, Daily  valsartan, 40 mg, Oral, Daily      Continuous Infusions:dilTIAZem, 10 mg/hr, Last Rate: 10 mg/hr (03/05/21 0546)      PRN Meds:.•  acetaminophen  •  calcium carbonate  •  dextrose  •  dextrose  •  glucagon (human recombinant)  •  insulin lispro **AND** insulin lispro  •  ipratropium-albuterol  •  labetalol  •  melatonin  •  ondansetron    Objective     VITAL SIGNS  Vitals:    03/05/21 0030 03/05/21 0230 03/05/21 0521 03/05/21 0544   BP: 118/81 123/59  120/82   BP Location:  Left arm  Left arm   Patient Position:  Lying  Lying   Pulse: 89 83  81   Resp:  16  15   Temp:  97.9 °F (36.6 °C)  98.1 °F (36.7 °C)   TempSrc:  Oral  Oral   SpO2:  95%  96%   Weight:   126 kg (277 lb 12.5 oz)    Height:           Flowsheet Rows      First Filed Value   Admission Height  198.1 cm (78\") Documented at 03/04/2021 0552   Admission Weight  135 kg (297 lb 9.9 oz) Documented at 03/04/2021 0552            Intake/Output Summary (Last 24 hours) at 3/5/2021 0658  Last data filed at 3/5/2021 0200  Gross per 24 hour   Intake 1348 ml "   Output 6025 ml   Net -4677 ml        TELEMETRY: Atrial fibrillation with controlled ventricular response    Physical Exam:  The patient is alert, oriented and in no distress.  Vital signs as noted above.  Head and neck revealed no carotid bruits or jugular venous distention.  No thyromegaly or lymphadenopathy is present  Lungs clear.  No wheezing.  Breath sounds are normal bilaterally.  Heart normal first and second heart sounds.  No murmur. No precordial rub is present.  No gallop is present.  Abdomen soft and nontender.  No organomegaly is present.  Extremities with good peripheral pulses without any pedal edema.  Skin warm and dry.  Musculoskeletal system is grossly normal  CNS grossly normal      Results Review:   I reviewed the patient's new clinical results.  Lab Results (last 24 hours)     Procedure Component Value Units Date/Time    POC Glucose Once [[id number]]  (Abnormal) Collected: 03/04/21 2226    Specimen: Blood Updated: 03/04/21 2227     Glucose 319 mg/dL      Comment: Serial Number: [id number]:  [id number]       POC Glucose Once [[id number]]  (Abnormal) Collected: 03/04/21 2019    Specimen: Blood Updated: 03/04/21 2021     Glucose 415 mg/dL      Comment: Serial Number: [id number]:  [id number]       TSH [[id number]]  (Abnormal) Collected: 03/04/21 1644    Specimen: Blood Updated: 03/04/21 1722     TSH 0.132 uIU/mL     POC Glucose Once [[id number]]  (Abnormal) Collected: 03/04/21 1721    Specimen: Blood Updated: 03/04/21 1722     Glucose 286 mg/dL      Comment: Serial Number: [id number]:  [id number]       D-dimer, Quantitative [[id number]]  (Normal) Collected: 03/04/21 1644    Specimen: Blood Updated: 03/04/21 1712     D-Dimer, Quantitative 0.30 mg/L (FEU)     Narrative:      Reference Range  --------------------------------------------------------------------     < 0.50   Negative Predictive Value  0.50-0.59   Indeterminate    >= 0.60   Probable VTE             A very low percentage of  patients with DVT may yield D-Dimer results   below the cut-off of 0.50 mg/L FEU.  This is known to be more   prevalent in patients with distal DVT.             Results of this test should always be interpreted in conjunction with   the patient's medical history, clinical presentation and other   findings.  Clinical diagnosis should not be based on the result of   INNOVANCE D-Dimer alone.    POC Glucose Once [[id number]]  (Abnormal) Collected: 03/04/21 1345    Specimen: Blood Updated: 03/04/21 1346     Glucose 264 mg/dL      Comment: Serial Number: [id number]:  [id number]       Urine Drug Screen - Urine, Clean Catch [[id number]]  (Abnormal) Collected: 03/04/21 0843    Specimen: Urine, Clean Catch Updated: 03/04/21 0923     Amphet/Methamphet, Screen Positive     Barbiturates Screen, Urine Negative     Benzodiazepine Screen, Urine Negative     Cocaine Screen, Urine Negative     Opiate Screen Negative     THC, Screen, Urine Negative     Methadone Screen, Urine Negative     Oxycodone Screen, Urine Negative    Narrative:      Negative Thresholds Per Drugs Screened:    Amphetamines                 500 ng/ml  Barbiturates                 200 ng/ml  Benzodiazepines              100 ng/ml  Cocaine                      300 ng/ml  Methadone                    300 ng/ml  Opiates                      300 ng/ml  Oxycodone                    100 ng/ml  THC                           50 ng/ml    The Normal Value for all drugs tested is negative. This report includes final unconfirmed screening results to be used for medical treatment purposes only. Unconfirmed results must not be used for non-medical purposes such as employment or legal testing. Clinical consideration should be applied to any drug of abuse test, particularly when unconfirmed results are used.          All urine drugs of abuse requests without chain of custody are for medical screening purposes only.  False positives are possible.      Blood Culture - Blood,  Hand, Left [[id number]] Collected: 03/04/21 0844    Specimen: Blood from Hand, Left Updated: 03/04/21 0858    Extra Tubes [[id number]] Collected: 03/04/21 0633    Specimen: Blood, Venous Line Updated: 03/04/21 0745    Narrative:      The following orders were created for panel order Extra Tubes.  Procedure                               Abnormality         Status                     ---------                               -----------         ------                     Gold Top - SST[[id number]]                                   Final result                 Please view results for these tests on the individual orders.    Gold Top - SST [[id number]] Collected: 03/04/21 0633    Specimen: Blood Updated: 03/04/21 0745     Extra Tube Hold for add-ons.     Comment: Auto resulted.       POC Lactate [[id number]]  (Normal) Collected: 03/04/21 0742    Specimen: Blood Updated: 03/04/21 0744     Lactate 1.2 mmol/L      Comment: Serial Number: [id number]:  [id number]       Blood Culture - Blood, Arm, Left [[id number]] Collected: 03/04/21 0738    Specimen: Blood from Arm, Left Updated: 03/04/21 0743    COVID-19, [facility]BBOTT IN-HOUSE,NASAL Swab (NO TRANSPORT MEDIA) 2 HR TAT - Swab, Nasopharynx [[id number]]  (Normal) Collected: 03/04/21 0648    Specimen: Swab from Nasopharynx Updated: 03/04/21 0718     COVID19 Presumptive Negative    Narrative:      Fact sheet for providers: https://www.fda.gov/media/688924/download     Fact sheet for patients: https://www.fda.gov/media/960263/download    Test performed by PCR.  If inconsistent with clinical signs and symptoms patient should be tested with different authorized molecular test.    Comprehensive Metabolic Panel [[id number]]  (Abnormal) Collected: 03/04/21 0628    Specimen: Blood Updated: 03/04/21 0701     Glucose 213 mg/dL      BUN 18 mg/dL      Creatinine 1.03 mg/dL      Sodium 137 mmol/L      Potassium 4.0 mmol/L      Comment: Slight hemolysis detected by analyzer. Results may be affected.         Chloride 102 mmol/L      CO2 27.0 mmol/L      Calcium 8.8 mg/dL      Total Protein 6.0 g/dL      Albumin 3.70 g/dL      ALT (SGPT) 22 U/L      AST (SGOT) 28 U/L      Alkaline Phosphatase 120 U/L      Total Bilirubin 0.6 mg/dL      eGFR Non African Amer 75 mL/min/1.73      Globulin 2.3 gm/dL      A/G Ratio 1.6 g/dL      BUN/Creatinine Ratio 17.5     Anion Gap 8.0 mmol/L     Narrative:      GFR Normal >60  Chronic Kidney Disease <60  Kidney Failure <15      Troponin [[id number]]  (Normal) Collected: 03/04/21 0628    Specimen: Blood Updated: 03/04/21 0701     Troponin T <0.010 ng/mL     Narrative:      Troponin T Reference Range:  <= 0.03 ng/mL-   Negative for AMI  >0.03 ng/mL-     Abnormal for myocardial necrosis.  Clinicians would have to utilize clinical acumen, EKG, Troponin and serial changes to determine if it is an Acute Myocardial Infarction or myocardial injury due to an underlying chronic condition.       Results may be falsely decreased if patient taking Biotin.            Imaging Results (Last 24 Hours)     Procedure Component Value Units Date/Time    XR Chest 1 View [[id number]] Collected: 03/04/21 0715     Updated: 03/04/21 0719    Narrative:      DATE OF EXAM:  3/4/2021 7:03 AM     PROCEDURE:  XR CHEST 1 VW-     INDICATIONS:  Cough. Evaluate for Covid. Shortness of breath.     COMPARISON:  Chest radiograph 3/16/2019     TECHNIQUE:   Single radiographic view of the chest was obtained.     FINDINGS:  Lungs are hyperexpanded. There is a background emphysema. There is a  single lead transvenous pacemaker/AICD device. There is mild  cardiomegaly. There has been development of airspace opacities in the  right lower lobe right hilar location right upper lobe. There is more  patchy groundglass in the left lung. No definite pleural effusion.       Impression:      Multifocal airspace opacities in the right lung. Groundglass opacity in  the left lower lobe. This could be seen with multifocal  pneumonia.  Underlying metastatic disease or malignancy cannot be excluded.  Continued imaging and clinical follow-up is recommended.  Background of emphysema is suspected.     Electronically Signed By-Antonia Gary MD On:3/4/2021 7:17 AM  This report was finalized on [id number] by  Antonia Gary MD.      LAB RESULTS (LAST 7 DAYS)    CBC  Results from last 7 days   Lab Units 03/04/21  0628   WBC 10*3/mm3 8.50   RBC 10*6/mm3 4.00*   HEMOGLOBIN g/dL 14.0   HEMATOCRIT % 40.7   MCV fL 101.7*   PLATELETS 10*3/mm3 204       BMP  Results from last 7 days   Lab Units 03/04/21  0628   SODIUM mmol/L 137   POTASSIUM mmol/L 4.0   CHLORIDE mmol/L 102   CO2 mmol/L 27.0   BUN mg/dL 18   CREATININE mg/dL 1.03   GLUCOSE mg/dL 213*       CMP   Results from last 7 days   Lab Units 03/04/21  0628   SODIUM mmol/L 137   POTASSIUM mmol/L 4.0   CHLORIDE mmol/L 102   CO2 mmol/L 27.0   BUN mg/dL 18   CREATININE mg/dL 1.03   GLUCOSE mg/dL 213*   ALBUMIN g/dL 3.70   BILIRUBIN mg/dL 0.6   ALK PHOS U/L 120*   AST (SGOT) U/L 28   ALT (SGPT) U/L 22         BNP        TROPONIN  Results from last 7 days   Lab Units 03/04/21  0628   TROPONIN T ng/mL <0.010       CoAg  Results from last 7 days   Lab Units 03/04/21  0628   INR  1.17*       Creatinine Clearance  Estimated Creatinine Clearance: 121.8 mL/min (by C-G formula based on SCr of 1.03 mg/dL).    ABG        Radiology  Xr Chest 1 View    Result Date: 3/4/2021  Multifocal airspace opacities in the right lung. Groundglass opacity in the left lower lobe. This could be seen with multifocal pneumonia. Underlying metastatic disease or malignancy cannot be excluded. Continued imaging and clinical follow-up is recommended. Background of emphysema is suspected.  Electronically Signed By-Antonia Gary MD On:3/4/2021 7:17 AM This report was finalized on [id number] by  Antonia Gary MD.              EKG          I personally viewed and interpreted the patient's EKG/Telemetry  data:    ECHOCARDIOGRAM:    Results for orders placed during the hospital encounter of 03/11/18   Adult Transesophageal Echo (ARIA) W/ Cont if Necessary Per Protocol    Narrative · The left ventricular cavity is moderately dilated.  · There is akinesis of the septum and inferior wall  · Estimated EF appears to be in the range of 26 - 30%.  · Normal right ventricular cavity size and systolic function noted.  · Left atrial cavity size is mildly dilated.  · There is a small mobile echodensity at the apex of the left atrial   appendage which is concerning for a small thrombus..  · Saline test results are negative  · Mild mitral valve regurgitation is present.  · There are no significant plaques in the aorta..  · There is no evidence of pericardial effusion.     A two-dimensional transesophageal echocardiogram was performed.  Complete   color flow velocity mapping and Doppler interrogation was performed.             STRESS MYOVIEW:    Cardiolite (Tc-99m Sestamibi) stress test    CARDIAC CATHETERIZATION:            OTHER:         Assessment/Plan     Active Problems:    Pneumonia of right lower lobe due to infectious organism      ]]]]]]]]]]]]]]]]]]]   impression  ==========  -Cough shortness of breath.     -Chronic atrial fibrillation-RVR.  Patient admitted to using meth.  Positive screening.     -Anticoagulation.  Patient was supposed to be on Coumadin but his INR is low.  Not sure he is taking the Coumadin.     -   Status postcardiac arrest with ventricular fibrillation probably related to cocaine and cardiomyopathy March of 2018     -status post single-chamber ICD ([employer] ) March 2018 by Dr. Jauregui at [facility] .  VVI at 40 per minute.  Status post ICD shock x3 March 2019 due to atrial fibrillation with a rapid ventricular response.  Status post ICD shock due to atrial fibrillation with rapid ventricular response 1/24/2020     -Nonischemic cardiomyopathy probably secondary to cocaine  use  Cardiac catheterization 03/15/2018 -nonobstructive disease - Dr. Moya at [facility]     -status post ICD 03/16/2018     - chronic atrial fibrillation on Coumadin.     -hypertension dyslipidemia diabetes peripheral neuropathy     -status post arthroscopic knee surgery     -nonsmoker     -Family history is positive for coronary artery disease     -allergic to penicillin.  =========  Plan  =============  Patient presented with cough shortness of breath and atrial fibrillation which is chronic with RVR.  Wean off intravenous Cardizem.  Start p.o. Lanoxin at 0.25 mg a day.  Patient recently received intravenous digoxin for a total of 0.75 mg as a loading dose.  Patient has borderline blood pressure.    Anticoagulation  Patient was supposed to be taking Coumadin but his INR is very low.  Restart Coumadin at home dose.    Status post ICD  Patient did not have any ICD shocks.  Recent interrogation of the ICD revealed excellent pacing parameters.    Medications were reviewed and updated.  Patient to be on Cardizem  mg a day Coreg 25 mg twice daily and Lanoxin.    Patient has personal issues that he needs to modify including usage of meth etc. which makes his heart rate faster and potentially can have ICD shocks.    Will adjust medications for rate control and discussed with patient regarding anticoagulation and importance of being on anticoagulation.    Have discussed with attending nurse for coordination of care.    Further plan will depend on patient's progress.  ]]]]]]]]]]]]]]]]]]          Iliana Rodrigez MD  03/05/21  06:58 EST

## 2021-03-05 NOTE — CONSULTS
"Continued Stay Note  [facility]     Patient Name: Shannan Arguelles  MRN: [id number]  Today's Date: 3/5/2021    Admit Date: 3/4/2021    Discharge Plan     Row Name 03/05/21 1202       Plan    Plan  Pending hospital course    Plan Comments  [facility] is working remotely. TRAVIS called pt's room phone to discuss treatment for amphetamines. Pt admitted that he had used substances prior to coming into the hospital, then told TRAVIS, \"but you don't have to worry about that. I'm 'retired'.\" TRAVIS offered treatment resources and pt stated he did not need/want any at this time. TRAVIS encouraged pt to reach back out as needed.        Phone communication or documentation only - no physical contact with patient or family.    Lorena Townsend [facility], [facility] [place]    Office: [phone]  Cell: [phone]  Fax: [phone]  E-mail: [email or web address]     "

## 2021-03-05 NOTE — PROGRESS NOTES
Discharge Planning Assessment  [facility]     Patient Name: Shannan Arguelles  MRN: [id number]  Today's Date: 3/5/2021    Admit Date: 3/4/2021    Discharge Needs Assessment     Row Name 03/05/21 7491       Living Environment    Lives With  other (see comments) Patient reports he has a roommate    Current Living Arrangements  home/apartment/condo    Primary Care Provided by  self    Provides Primary Care For  no one    Quality of Family Relationships  unable to assess    Able to Return to Prior Arrangements  yes       Resource/Environmental Concerns    Resource/Environmental Concerns  none    Transportation Concerns  car, none       Transition Planning    Patient/Family Anticipates Transition to  home    Patient/Family Anticipated Services at Transition  none    Transportation Anticipated  family or friend will provide;car, drives self       Discharge Needs Assessment    Readmission Within the Last 30 Days  no previous admission in last 30 days    Equipment Currently Used at Home  none    Concerns to be Addressed  denies needs/concerns at this time;no discharge needs identified    Anticipated Changes Related to Illness  none    Equipment Needed After Discharge  none    Provided Post Acute Provider List?  Refused        Discharge Plan     Row Name 03/05/21 7640       Plan    Plan  DC Plan: Anticipate routine home, denies needs at this time.    Patient/Family in Agreement with Plan  yes    Plan Comments  CM met with patient at bedside to discuss dc planning. Patient reports he lives with a roommate, PCP confirmed- Lluvia Sarabia, preferred pharmacy confirmed- [facility], reports no trouble affording medications. Declined needs for any DME/HH services at this time. DC Barriers: Cardizem gtt. Anticipate dc home once medically stable.     Demographic Summary     Row Name 03/05/21 3440       General Information    Admission Type  observation    Reason for Consult  discharge planning    Preferred Language  English      Used During This Interaction  no       Contact Information    Permission Granted to Share Info With          Functional Status     Row Name 03/05/21 1555       Functional Status    Usual Activity Tolerance  good    Current Activity Tolerance  good       Functional Status, IADL    Medications  independent    Meal Preparation  independent    Housekeeping  independent    Laundry  independent    Shopping  independent        Met with patient in room wearing PPE: mask/goggles.      Maintained distance greater than six feet and spent less than 15 minutes in the room.      Jenni Estrada RN     Office Phone: [phone]  Office Cell: [phone]

## 2021-03-05 NOTE — CONSULTS
"Diabetes Education  Assessment/Teaching    Patient Name:  Shannan Arguelles  YOB: 1965  MRN: [id number]  Admit Date:  3/4/2021      Assessment Date:  3/5/2021    Most Recent Value   General Information    Referral From:  Blood glucose, A1c [On 3/5/2021 A1c was 10.4% and admission blood sugar was 213.]   Height  198.1 cm (78\")   Height Method  Stated   Weight  125 kg (276 lb)   Weight Method  Standing scale   Pregnancy Assessment   Diabetes History   What type of diabetes do you have?  Type 2   Length of Diabetes Diagnosis  10 + years [Diagnosed in 2004.]   Current DM knowledge  fair   Do you test your blood sugar at home?  yes   Frequency of checks  5X a day   Meter type  Freestyle Lite about 2 years old   Who performs the test?  patient   Typical readings  170-180 in the morning and 120-125 in the evenings after working   Have you had high blood sugar? (>140mg/dl)  yes   How often do you have high blood sugar?  unknown   When was your last high blood sugar?  Admission blood sugar 213   Education Preferences   What areas of diabetes would you like to learn about?  avoiding high blood sugar, diabetes complications, stress/coping skills   Nutrition Information   Assessment Topics   Problem Solving - Assessment  Needs education   Reducing Risk - Assessment  Needs education   Healthy Coping - Assessment  Needs education   DM Goals   Problem Solving - Goal  Today   Reducing Risk - Goal  Today   Healthy Coping - Goal  Today            Most Recent Value   DM Education Needs   Meter  Has own   Meter Type  Freestyle   Frequency of Testing  Other (comment) [5X a day]   Medication  Insulin [Patient stated that he takes Lantus 39 units at bedtime and Humalog sliding scale (1 unit for every 10 g carbs for meals and 1 units for every 15 grams of carbs for snacks) at home.]   Problem Solving  Hyperglycemia, Signs, Symptoms, Treatment   Reducing Risks  A1C testing [On 3/5/2021 A1c was 10.4%]   Physical Activity  Walking " [Does a lot of walking at work]   Discharge Plan  Home   Motivation  Moderate, Engaged   Teaching Method  Discussion, Handouts   Patient Response  Verbalized understanding            Other Comments:  A1c info sheet given with discussion on A1c target and healthy blood sugar range. Patient stated that at the end of  he lost both of his parents within a month of each other and then in 2020 COVID.  He stated that he works at [employer] and for the past year it has been like working the holidays nonstop. He also said after his mother  he started having to cook and he went a little overboard with the cooking. Allowed patient time to vent and discuss getting back on track to lowering his A1c. On 2020 A1c was 12.7% so A1c is coming down.  Patient stated he sees Dr. Montesinos at the [facility]. Patient has no further questions or concerns related to diabetes at this time.        Electronically signed by:  Jodie Navarro RN  21 14:34 EST

## 2021-03-05 NOTE — PLAN OF CARE
Problem: Adult Inpatient Plan of Care  Goal: Plan of Care Review  Outcome: Ongoing, Progressing  Flowsheets (Taken 3/5/2021 0226)  Progress: no change  Plan of Care Reviewed With: patient  Outcome Summary: Pt still on cardizem gtt at 10mg/hr. Rested well throught the night  Goal: Patient-Specific Goal (Individualized)  Outcome: Ongoing, Progressing  Goal: Absence of Hospital-Acquired Illness or Injury  Outcome: Ongoing, Progressing  Intervention: Identify and Manage Fall Risk  Recent Flowsheet Documentation  Taken 3/4/2021 2300 by Jodie Smith RN  Safety Promotion/Fall Prevention:   assistive device/personal items within reach   clutter free environment maintained   lighting adjusted   nonskid shoes/slippers when out of bed   room organization consistent   safety round/check completed  Taken 3/4/2021 1933 by Jodie Smith RN  Safety Promotion/Fall Prevention:   assistive device/personal items within reach   clutter free environment maintained   lighting adjusted   nonskid shoes/slippers when out of bed   room organization consistent   safety round/check completed  Intervention: Prevent Skin Injury  Recent Flowsheet Documentation  Taken 3/4/2021 2300 by Jodie Smith RN  Body Position: position changed independently  Taken 3/4/2021 1933 by Jodie Smith RN  Body Position: position changed independently  Intervention: Prevent Infection  Recent Flowsheet Documentation  Taken 3/4/2021 2300 by Jodie Smith RN  Infection Prevention:   environmental surveillance performed   equipment surfaces disinfected   hand hygiene promoted   personal protective equipment utilized   rest/sleep promoted   single patient room provided   visitors restricted/screened  Taken 3/4/2021 1933 by Jodie Smith RN  Infection Prevention:   environmental surveillance performed   equipment surfaces disinfected   hand hygiene promoted   personal protective equipment utilized   rest/sleep promoted   single patient room provided   visitors  restricted/screened  Goal: Optimal Comfort and Wellbeing  Outcome: Ongoing, Progressing  Intervention: Provide Person-Centered Care  Recent Flowsheet Documentation  Taken 3/4/2021 1933 by Jodie Smith RN  Trust Relationship/Rapport:   care explained   choices provided   emotional support provided   empathic listening provided   questions answered   questions encouraged   reassurance provided   thoughts/feelings acknowledged  Goal: Readiness for Transition of Care  Outcome: Ongoing, Progressing     Problem: Arrhythmia/Dysrhythmia  Goal: Normalized Cardiac Rhythm  Outcome: Ongoing, Progressing   Goal Outcome Evaluation:  Plan of Care Reviewed With: patient  Progress: no change  Outcome Summary: Pt still on cardizem gtt at 10mg/hr. Rested well throught the night

## 2021-03-05 NOTE — PROGRESS NOTES
"Pharmacy dosing service  Anticoagulant  Warfarin     Subjective:    Shannan Arguelles is a 55 y.o.male being continued on warfarin for atrial fibrillation.    INR Goal: 2 - 3  Home medication?: Yes, warfarin 10  mg PO on MWFSat and warfarin 7.5 mg PO on SunTTh  Bridge Therapy Present?:  No  Interacting Medications Evaluation (New/Present/Discontinued): methimazole (home medication), azithromycin (new med: increased bleeding risk), ceftriaxone (new med: increased bleeding risk)  Additional Contributing Factors: acute illness      Assessment/Plan:    INR is subtherapeutic today. Will restart home regimen and give warfarin 10 mg tonight.    Continue to monitor and adjust based on INR.         Date 3/5           INR 1.17           Dose 10 mg               Objective:  [Ht: 198.1 cm (78\"); Wt: 125 kg (276 lb); BMI: Body mass index is 31.9 kg/m².]    Lab Results   Component Value Date    ALBUMIN 3.40 (L) 03/05/2021     Lab Results   Component Value Date    INR 1.17 (L) 03/04/2021    INR 1.90 (A) 12/18/2020    INR 1.41 (L) 12/17/2020    PROTIME 12.8 (L) 03/04/2021    PROTIME 15.3 (L) 12/17/2020    PROTIME 23.3 RESULT CHECKED 03/18/2019     Lab Results   Component Value Date    HGB 13.9 03/05/2021    HGB 14.0 03/04/2021    HGB 13.4 12/22/2020     Lab Results   Component Value Date    HCT 39.7 03/05/2021    HCT 40.7 03/04/2021    HCT 41.1 12/22/2020       Michelle Navarro, [facility]  03/05/21 16:30 EST     "

## 2021-03-05 NOTE — PROGRESS NOTES
"Reviewed     LOS: 0 days   Patient Care Team:  Lluvia Sarabia APRN as PCP - General  Josie Montesinos MD as Consulting Physician (Endocrinology)  Iliana Rodrigez MD as Consulting Physician (Cardiology)    Chief Complaint:  SOA    Subjective   \"I feel OK\"  Interval History:   Pt getting cardiac ECHO    Patient Complaints: None  Patient Denies: Pain, cough, SOA  History taken from: patient    Review of Systems:   neg  Objective     Vital Signs  Temp:  [97.9 °F (36.6 °C)-99 °F (37.2 °C)] 98.1 °F (36.7 °C)  Heart Rate:  [] 81  Resp:  [15-23] 15  BP: (107-175)/() 120/82    Physical Exam:     General Appearance:    Alert, cooperative, in no acute distress   Head:    Normocephalic, without obvious abnormality, atraumatic   Eyes:            Lids and lashes normal, conjunctivae and sclerae normal, no   icterus   Ears:    Ears appear intact with no abnormalities noted   Throat:   No oral lesions, no thrush, oral mucosa moist   Neck:   No adenopathy, supple, trachea midline   Back:     No kyphosis present, no scoliosis present, no skin lesions,      erythema or scars, no tenderness to percussion or                   palpation,   range of motion normal   Lungs:     Clear to auscultation,respirations regular, even and                  unlabored    Heart:    Regular rhythm and normal rate, normal S1 and S2, no            murmur, no gallop, no rub, no click   Chest Wall:    No abnormalities observed   Abdomen:     Normal bowel sounds, no masses, no organomegaly, soft        non-tender, non-distended, no guarding, no rebound                tenderness   Rectal:     Deferred   Extremities:   Moves all extremities well, no edema, no cyanosis, no             redness   Pulses:   Pulses palpable and equal bilaterally   Skin:   No bleeding, bruising or rash   Lymph nodes:   No palpable adenopathy   Neurologic:   Cranial nerves 2 - 12 grossly intact, sensation intact        Results Review:     I reviewed the patient's " new clinical results.  I reviewed the patient's new imaging results and agree with the interpretation.    Medication Review:   Scheduled Meds:atorvastatin, 40 mg, Oral, Nightly  azithromycin, 500 mg, Oral, Q24H  carvedilol, 25 mg, Oral, Q12H  cefTRIAXone, 2 g, Intravenous, Q24H  furosemide, 40 mg, Oral, Daily  guaiFENesin, 1,200 mg, Oral, Q12H  insulin glargine, 32 Units, Subcutaneous, Nightly  insulin lispro, 0-7 Units, Subcutaneous, TID AC  methIMAzole, 10 mg, Oral, Daily  valsartan, 40 mg, Oral, Daily      Continuous Infusions:dilTIAZem, 10 mg/hr, Last Rate: 10 mg/hr (03/05/21 0546)      PRN Meds:.  acetaminophen    calcium carbonate    dextrose    dextrose    glucagon (human recombinant)    insulin lispro **AND** insulin lispro    ipratropium-albuterol    labetalol    melatonin    ondansetron    Labs:  Lab Results (last 24 hours)       Procedure Component Value Units Date/Time    POC Glucose Once [[id number]]  (Abnormal) Collected: 03/04/21 2226    Specimen: Blood Updated: 03/04/21 2227     Glucose 319 mg/dL      Comment: Serial Number: [id number]:  [id number]       POC Glucose Once [[id number]]  (Abnormal) Collected: 03/04/21 2019    Specimen: Blood Updated: 03/04/21 2021     Glucose 415 mg/dL      Comment: Serial Number: [id number]:  [id number]       TSH [[id number]]  (Abnormal) Collected: 03/04/21 1644    Specimen: Blood Updated: 03/04/21 1722     TSH 0.132 uIU/mL     POC Glucose Once [[id number]]  (Abnormal) Collected: 03/04/21 1721    Specimen: Blood Updated: 03/04/21 1722     Glucose 286 mg/dL      Comment: Serial Number: [id number]:  [id number]       D-dimer, Quantitative [[id number]]  (Normal) Collected: 03/04/21 1644    Specimen: Blood Updated: 03/04/21 1712     D-Dimer, Quantitative 0.30 mg/L (FEU)     Narrative:      Reference Range  --------------------------------------------------------------------     < 0.50   Negative Predictive Value  0.50-0.59   Indeterminate    >= 0.60   Probable  VTE             A very low percentage of patients with DVT may yield D-Dimer results   below the cut-off of 0.50 mg/L FEU.  This is known to be more   prevalent in patients with distal DVT.             Results of this test should always be interpreted in conjunction with   the patient's medical history, clinical presentation and other   findings.  Clinical diagnosis should not be based on the result of   INNOVANCE D-Dimer alone.    POC Glucose Once [[id number]]  (Abnormal) Collected: 03/04/21 1345    Specimen: Blood Updated: 03/04/21 1346     Glucose 264 mg/dL      Comment: Serial Number: [id number]:  [id number]       Urine Drug Screen - Urine, Clean Catch [[id number]]  (Abnormal) Collected: 03/04/21 0843    Specimen: Urine, Clean Catch Updated: 03/04/21 0923     Amphet/Methamphet, Screen Positive     Barbiturates Screen, Urine Negative     Benzodiazepine Screen, Urine Negative     Cocaine Screen, Urine Negative     Opiate Screen Negative     THC, Screen, Urine Negative     Methadone Screen, Urine Negative     Oxycodone Screen, Urine Negative    Narrative:      Negative Thresholds Per Drugs Screened:    Amphetamines                 500 ng/ml  Barbiturates                 200 ng/ml  Benzodiazepines              100 ng/ml  Cocaine                      300 ng/ml  Methadone                    300 ng/ml  Opiates                      300 ng/ml  Oxycodone                    100 ng/ml  THC                           50 ng/ml    The Normal Value for all drugs tested is negative. This report includes final unconfirmed screening results to be used for medical treatment purposes only. Unconfirmed results must not be used for non-medical purposes such as employment or legal testing. Clinical consideration should be applied to any drug of abuse test, particularly when unconfirmed results are used.          All urine drugs of abuse requests without chain of custody are for medical screening purposes only.  False positives are  possible.      Blood Culture - Blood, Hand, Left [[id number]] Collected: 03/04/21 0844    Specimen: Blood from Hand, Left Updated: 03/04/21 0858    Extra Tubes [[id number]] Collected: 03/04/21 0633    Specimen: Blood, Venous Line Updated: 03/04/21 0745    Narrative:      The following orders were created for panel order Extra Tubes.  Procedure                               Abnormality         Status                     ---------                               -----------         ------                     Gold Top - SST[[id number]]                                   Final result                 Please view results for these tests on the individual orders.    Gold Top - SST [[id number]] Collected: 03/04/21 0633    Specimen: Blood Updated: 03/04/21 0745     Extra Tube Hold for add-ons.     Comment: Auto resulted.       POC Lactate [[id number]]  (Normal) Collected: 03/04/21 0742    Specimen: Blood Updated: 03/04/21 0744     Lactate 1.2 mmol/L      Comment: Serial Number: [id number]:  [id number]       Blood Culture - Blood, Arm, Left [[id number]] Collected: 03/04/21 0738    Specimen: Blood from Arm, Left Updated: 03/04/21 0743    COVID-19, [facility]BBOTT IN-HOUSE,NASAL Swab (NO TRANSPORT MEDIA) 2 HR TAT - Swab, Nasopharynx [[id number]]  (Normal) Collected: 03/04/21 0648    Specimen: Swab from Nasopharynx Updated: 03/04/21 0718     COVID19 Presumptive Negative    Narrative:      Fact sheet for providers: https://www.fda.gov/media/825425/download     Fact sheet for patients: https://www.fda.gov/media/403016/download    Test performed by PCR.  If inconsistent with clinical signs and symptoms patient should be tested with different authorized molecular test.    Comprehensive Metabolic Panel [[id number]]  (Abnormal) Collected: 03/04/21 0628    Specimen: Blood Updated: 03/04/21 0701     Glucose 213 mg/dL      BUN 18 mg/dL      Creatinine 1.03 mg/dL      Sodium 137 mmol/L      Potassium 4.0 mmol/L      Comment: Slight hemolysis detected  by analyzer. Results may be affected.        Chloride 102 mmol/L      CO2 27.0 mmol/L      Calcium 8.8 mg/dL      Total Protein 6.0 g/dL      Albumin 3.70 g/dL      ALT (SGPT) 22 U/L      AST (SGOT) 28 U/L      Alkaline Phosphatase 120 U/L      Total Bilirubin 0.6 mg/dL      eGFR Non African Amer 75 mL/min/1.73      Globulin 2.3 gm/dL      A/G Ratio 1.6 g/dL      BUN/Creatinine Ratio 17.5     Anion Gap 8.0 mmol/L     Narrative:      GFR Normal >60  Chronic Kidney Disease <60  Kidney Failure <15      Troponin [[id number]]  (Normal) Collected: 03/04/21 0628    Specimen: Blood Updated: 03/04/21 0701     Troponin T <0.010 ng/mL     Narrative:      Troponin T Reference Range:  <= 0.03 ng/mL-   Negative for AMI  >0.03 ng/mL-     Abnormal for myocardial necrosis.  Clinicians would have to utilize clinical acumen, EKG, Troponin and serial changes to determine if it is an Acute Myocardial Infarction or myocardial injury due to an underlying chronic condition.       Results may be falsely decreased if patient taking Biotin.               Assessment/Plan       Pneumonia of right lower lobe due to infectious organism  1. PNA-IV antibiotics, Mucinex, hold duonebs as HR >150  2. Afib with RVR-Improved, Cardizem drip. Cardiology consulting  3. Dyspnea- Improved-Ddimer neg  4. DM-BS elevated-add SS insulin coverage-Check a1C  5. Illegal Drug Use-Meth-counseled, will have SS consult for rehab/Tx  6. CAD-Consult cardiology     Await morning labs  Poss DC tomorrow        Lluvia Sarabia, JESUSITA  03/05/21  06:57 EST

## 2021-03-06 VITALS
DIASTOLIC BLOOD PRESSURE: 86 MMHG | RESPIRATION RATE: 18 BRPM | HEIGHT: 78 IN | OXYGEN SATURATION: 98 % | HEART RATE: 96 BPM | WEIGHT: 269.4 LBS | BODY MASS INDEX: 31.17 KG/M2 | TEMPERATURE: 98.4 F | SYSTOLIC BLOOD PRESSURE: 126 MMHG

## 2021-03-06 LAB
GLUCOSE BLDC GLUCOMTR-MCNC: 233 MG/DL (ref 70–105)
INR PPP: 1.04 (ref 2–3)
PROTHROMBIN TIME: 11.4 SECONDS (ref 19.4–28.5)

## 2021-03-06 PROCEDURE — 63710000001 INSULIN LISPRO (HUMAN) PER 5 UNITS: Performed by: NURSE PRACTITIONER

## 2021-03-06 PROCEDURE — 99214 OFFICE O/P EST MOD 30 MIN: CPT | Performed by: INTERNAL MEDICINE

## 2021-03-06 PROCEDURE — 82962 GLUCOSE BLOOD TEST: CPT

## 2021-03-06 PROCEDURE — 85610 PROTHROMBIN TIME: CPT | Performed by: INTERNAL MEDICINE

## 2021-03-06 PROCEDURE — 96376 TX/PRO/DX INJ SAME DRUG ADON: CPT

## 2021-03-06 PROCEDURE — 25010000002 CEFTRIAXONE PER 250 MG: Performed by: EMERGENCY MEDICINE

## 2021-03-06 PROCEDURE — G0378 HOSPITAL OBSERVATION PER HR: HCPCS

## 2021-03-06 RX ORDER — CEFUROXIME AXETIL 500 MG/1
500 TABLET ORAL 2 TIMES DAILY
Qty: 14 TABLET | Refills: 0 | Status: SHIPPED | OUTPATIENT
Start: 2021-03-06 | End: 2021-03-13

## 2021-03-06 RX ORDER — DIGOXIN 250 MCG
250 TABLET ORAL
Qty: 30 TABLET | Refills: 3 | Status: SHIPPED | OUTPATIENT
Start: 2021-03-06 | End: 2021-06-07 | Stop reason: SDUPTHER

## 2021-03-06 RX ADMIN — AZITHROMYCIN DIHYDRATE 500 MG: 250 TABLET, FILM COATED ORAL at 08:11

## 2021-03-06 RX ADMIN — CARVEDILOL 25 MG: 25 TABLET, FILM COATED ORAL at 08:11

## 2021-03-06 RX ADMIN — GUAIFENESIN 1200 MG: 600 TABLET, EXTENDED RELEASE ORAL at 08:11

## 2021-03-06 RX ADMIN — CEFTRIAXONE SODIUM 2 G: 10 INJECTION, POWDER, FOR SOLUTION INTRAVENOUS at 08:10

## 2021-03-06 RX ADMIN — INSULIN LISPRO 3 UNITS: 100 INJECTION, SOLUTION INTRAVENOUS; SUBCUTANEOUS at 08:12

## 2021-03-06 RX ADMIN — DILTIAZEM HYDROCHLORIDE 180 MG: 180 CAPSULE, COATED, EXTENDED RELEASE ORAL at 08:11

## 2021-03-06 RX ADMIN — FUROSEMIDE 40 MG: 40 TABLET ORAL at 08:11

## 2021-03-06 RX ADMIN — METHIMAZOLE 10 MG: 5 TABLET ORAL at 08:11

## 2021-03-06 RX ADMIN — INSULIN LISPRO 10 UNITS: 100 INJECTION, SOLUTION INTRAVENOUS; SUBCUTANEOUS at 08:10

## 2021-03-06 RX ADMIN — VALSARTAN 40 MG: 80 TABLET, FILM COATED ORAL at 08:11

## 2021-03-06 NOTE — NURSING NOTE
Ok to discharge from cardiology standpoint per Dr. Santiago. Follow up with Dr. Rodrigez outpatient.

## 2021-03-06 NOTE — DISCHARGE INSTR - ACTIVITY
Monitor blood pressure and weight daily first thing in the morning.   Call PCP if weight increase of 3lbs in one day or 5lbs in 2 days.   Discontinue use of methamphetamines and all stimulants including caffeine.  Call PCP with any worsening symptoms.

## 2021-03-06 NOTE — PROGRESS NOTES
Referring Provider: No att. providers found    Reason for follow-up:  Atrial fibrillation  Status post ICD     Patient Care Team:  Lluvia Sarabia APRN as PCP - Josie Winter MD as Consulting Physician (Endocrinology)  Iliana Rodrigez MD as Consulting Physician (Cardiology)    Subjective .  Feeling better     ROS    Since I have last seen, the patient has been without any chest discomfort ,shortness of breath, palpitations, dizziness or syncope.  Denies having any headache ,abdominal pain ,nausea, vomiting , diarrhea constipation, loss of weight or loss of appetite.  Denies having any excessive bruising ,hematuria or blood in the stool.    Review of all systems negative except as indicated    History  Past Medical History:   Diagnosis Date   • Acute respiratory failure (CMS/HCC)    • Arrhythmia    • Atrial fibrillation (CMS/HCC)    • Cardiac arrest with ventricular fibrillation (CMS/HCC)    • Cardiomyopathy (CMS/HCC)    • Diabetes mellitus (CMS/HCC)    • Diabetes mellitus type I (CMS/HCC)    • Hyperlipidemia    • Hypertension    • Peripheral neuropathy    • Pneumonia    • Sleep apnea    • Substance abuse (CMS/[facility])        Past Surgical History:   Procedure Laterality Date   • CARDIAC CATHETERIZATION N/A 3/15/2018    Procedure: Left Heart Cath and right heart cath;  Surgeon: Valentin Moya MD;  Location: [facility] INVASIVE LOCATION;  Service: Cardiovascular   • CARDIAC ELECTROPHYSIOLOGY PROCEDURE N/A 3/16/2018    Procedure: Device Implant-[place]    ICD;  Surgeon: Robert Jauregui MD;  Location: [facility] INVASIVE LOCATION;  Service: Cardiovascular   • CARDIAC ELECTROPHYSIOLOGY PROCEDURE N/A 5/17/2018    Procedure: NIPS  Threshold test of his ICD-[place]  Needs to be scheduled once he has had therapeutic INR's for 3-4 weeks in a row.;  Surgeon: Robert Jauregui MD;  Location: [facility] INVASIVE LOCATION;  Service: Cardiology   • CARDIAC ELECTROPHYSIOLOGY PROCEDURE N/A 5/17/2018    Procedure:  Cardioversion;  Surgeon: Robert Jauregui MD;  Location: [facility] CATH INVASIVE LOCATION;  Service: Cardiology   • COLONOSCOPY     • INSERT / REPLACE / REMOVE PACEMAKER      ICD [place] PLACED 3/16/18   • KNEE ARTHROSCOPY Bilateral        Family History   Problem Relation Age of Onset   • Alcohol abuse Maternal Aunt    • No Known Problems Mother    • Heart disease Father    • Atrial fibrillation Father    • No Known Problems Maternal Grandmother    • No Known Problems Maternal Grandfather    • No Known Problems Paternal Grandmother    • No Known Problems Paternal Grandfather        Social History     Tobacco Use   • Smoking status: Former Smoker     Years: 4.00     Types: Cigarettes     Quit date:      Years since quittin.2   • Smokeless tobacco: Never Used   Substance Use Topics   • Alcohol use: Yes     Alcohol/week: 2.0 standard drinks     Types: 2 Cans of beer per week     Comment: occassionally   • Drug use: Yes     Types: Cocaine(coke)     Comment: meth about 4 days ago        No medications prior to admission.       Allergies  Penicillins    Scheduled Meds:atorvastatin, 40 mg, Oral, Nightly  carvedilol, 25 mg, Oral, Q12H  digoxin, 250 mcg, Oral, Daily  dilTIAZem CD, 180 mg, Oral, Q24H  furosemide, 40 mg, Oral, Daily  guaiFENesin, 1,200 mg, Oral, Q12H  insulin glargine, 32 Units, Subcutaneous, Nightly  insulin lispro, 0-7 Units, Subcutaneous, TID AC  insulin lispro, 10 Units, Subcutaneous, TID With Meals  methIMAzole, 10 mg, Oral, Daily  valsartan, 40 mg, Oral, Daily  warfarin, 10 mg, Oral, Once per day on Mon Wed Fri Sat  [START ON 3/7/2021] warfarin, 7.5 mg, Oral, Once per day on Sun Tue Thu      Continuous Infusions:dilTIAZem, 10 mg/hr, Last Rate: Stopped (21 1650)  Pharmacy to dose warfarin,       PRN Meds:.•  acetaminophen  •  calcium carbonate  •  dextrose  •  dextrose  •  glucagon (human recombinant)  •  insulin lispro **AND** insulin lispro  •  ipratropium-albuterol  •  labetalol  •   "melatonin  •  ondansetron  •  Pharmacy to dose warfarin    Objective     VITAL SIGNS  Vitals:    03/05/21 1653 03/05/21 1806 03/05/21 2240 03/06/21 0612   BP: 145/66 131/66 117/69 126/86   BP Location:  Left arm Left arm Left arm   Patient Position:  Lying Lying    Pulse: 82 83 78 96   Resp:  16 16 18   Temp:  98.5 °F (36.9 °C) 98.4 °F (36.9 °C) 98.4 °F (36.9 °C)   TempSrc:  Oral Oral Oral   SpO2:  95% 92% 98%   Weight:    122 kg (269 lb 6.4 oz)   Height:           Flowsheet Rows      First Filed Value   Admission Height  198.1 cm (78\") Documented at 03/04/2021 0552   Admission Weight  135 kg (297 lb 9.9 oz) Documented at 03/04/2021 0552            Intake/Output Summary (Last 24 hours) at 3/6/2021 1047  Last data filed at 3/6/2021 0906  Gross per 24 hour   Intake 840 ml   Output 2725 ml   Net -1885 ml        TELEMETRY: Atrial fibrillation with controlled ventricular response    Physical Exam:  The patient is alert, oriented and in no distress.  Vital signs as noted above.  Head and neck revealed no carotid bruits or jugular venous distention.  No thyromegaly or lymphadenopathy is present  Lungs clear.  No wheezing.  Breath sounds are normal bilaterally.  Heart normal first and second heart sounds.  No murmur. No precordial rub is present.  No gallop is present.  Abdomen soft and nontender.  No organomegaly is present.  Extremities with good peripheral pulses without any pedal edema.  Skin warm and dry.  Musculoskeletal system is grossly normal  CNS grossly normal      Results Review:   I reviewed the patient's new clinical results.  Lab Results (last 24 hours)     Procedure Component Value Units Date/Time    Blood Culture - Blood, Hand, Left [[id number]] Collected: 03/04/21 0844    Specimen: Blood from Hand, Left Updated: 03/06/21 0900     Blood Culture No growth at 2 days    Blood Culture - Blood, Arm, Left [[id number]] Collected: 03/04/21 0738    Specimen: Blood from Arm, Left Updated: 03/06/21 0745     Blood " Culture No growth at 2 days    POC Glucose Once [[id number]]  (Abnormal) Collected: 03/06/21 0659    Specimen: Blood Updated: 03/06/21 0701     Glucose 233 mg/dL      Comment: Serial Number: [id number]:  [id number]       Protime-INR [[id number]]  (Abnormal) Collected: 03/06/21 0454    Specimen: Blood Updated: 03/06/21 0529     Protime 11.4 Seconds      INR 1.04    POC Glucose Once [[id number]]  (Abnormal) Collected: 03/05/21 2032    Specimen: Blood Updated: 03/05/21 2039     Glucose 293 mg/dL      Comment: Serial Number: [id number]:  [id number]       Protime-INR [[id number]]  (Abnormal) Collected: 03/05/21 1645    Specimen: Blood Updated: 03/05/21 1758     Protime 11.5 Seconds      INR 1.05    POC Glucose Once [[id number]]  (Abnormal) Collected: 03/05/21 1659    Specimen: Blood Updated: 03/05/21 1700     Glucose 302 mg/dL      Comment: Serial Number: [id number]:  [id number]             Imaging Results (Last 24 Hours)     ** No results found for the last 24 hours. **      LAB RESULTS (LAST 7 DAYS)    CBC  Results from last 7 days   Lab Units 03/05/21  0704 03/04/21 0628   WBC 10*3/mm3 8.00 8.50   RBC 10*6/mm3 3.97* 4.00*   HEMOGLOBIN g/dL 13.9 14.0   HEMATOCRIT % 39.7 40.7   MCV fL 100.1* 101.7*   PLATELETS 10*3/mm3 191 204       BMP  Results from last 7 days   Lab Units 03/05/21  0704 03/04/21 0628   SODIUM mmol/L 136 137   POTASSIUM mmol/L 4.0 4.0   CHLORIDE mmol/L 102 102   CO2 mmol/L 26.0 27.0   BUN mg/dL 15 18   CREATININE mg/dL 0.80 1.03   GLUCOSE mg/dL 236* 213*       CMP   Results from last 7 days   Lab Units 03/05/21  0704 03/04/21 0628   SODIUM mmol/L 136 137   POTASSIUM mmol/L 4.0 4.0   CHLORIDE mmol/L 102 102   CO2 mmol/L 26.0 27.0   BUN mg/dL 15 18   CREATININE mg/dL 0.80 1.03   GLUCOSE mg/dL 236* 213*   ALBUMIN g/dL 3.40* 3.70   BILIRUBIN mg/dL 1.4* 0.6   ALK PHOS U/L 102 120*   AST (SGOT) U/L 15 28   ALT (SGPT) U/L 16 22         BNP        TROPONIN  Results from last 7 days   Lab Units  03/05/21  0704   TROPONIN T ng/mL <0.010       CoAg  Results from last 7 days   Lab Units 03/06/21  0454 03/05/21  1645 03/04/21  0628   INR  1.04* 1.05* 1.17*       Creatinine Clearance  Estimated Creatinine Clearance: 155.3 mL/min (by C-G formula based on SCr of 0.8 mg/dL).    ABG        Radiology  No radiology results for the last day            EKG          I personally viewed and interpreted the patient's EKG/Telemetry data:    ECHOCARDIOGRAM:    Results for orders placed during the hospital encounter of 03/04/21    Adult Transthoracic Echo Complete W/ Cont if Necessary Per Protocol    Interpretation Summary  · Estimated left ventricular EF = 35% Left ventricular systolic function is moderately decreased.    Indications  Atrial fibrillation    Technically satisfactory study.  Mitral valve is structurally normal.  Moderate mitral regurgitation  Tricuspid valve is structurally normal.  Mild tricuspid regurgitation  Aortic valve is structurally normal.  Pulmonic valve could not be well visualized.  No evidence for aortic regurgitation is seen by Doppler study.  Biatrial enlargement.  Biventricular enlargement with diffuse left ventricular hypocontractility with ejection fraction of 35%.  Atrial septum is intact.  Aorta is normal.  No pericardial effusion or intracardiac thrombus is seen.    Impression  Mild tricuspid and moderate mitral regurgitation.  Biatrial enlargement.  Biventricular enlargement with diffuse left ventricular hypocontractility with ejection fraction of 35%.          STRESS MYOVIEW:    Cardiolite (Tc-99m Sestamibi) stress test    CARDIAC CATHETERIZATION:            OTHER:         Assessment/Plan     Active Problems:    Pneumonia of right lower lobe due to infectious organism      ]]]]]]]]]]]]]]]]]]]   impression  ==========  -Cough shortness of breath.     -Chronic atrial fibrillation-RVR.  Patient admitted to using meth.  Positive screening.     -Anticoagulation.  Patient was supposed to be on  Coumadin but his INR is low.  Not sure he is taking the Coumadin.  Patient is on Coumadin his INR is still on the lower side  Discussed with patient about using medications regularly     -   Status postcardiac arrest with ventricular fibrillation probably related to cocaine and cardiomyopathy March of 2018     -status post single-chamber ICD ([employer] ) March 2018 by Dr. Jauregui at [facility] .  VVI at 40 per minute.  Status post ICD shock x3 March 2019 due to atrial fibrillation with a rapid ventricular response.  Status post ICD shock due to atrial fibrillation with rapid ventricular response 1/24/2020     -Nonischemic cardiomyopathy probably secondary to cocaine use  Cardiac catheterization 03/15/2018 -nonobstructive disease - Dr. Moya at [facility]     -status post ICD 03/16/2018     - chronic atrial fibrillation on Coumadin.  Patient is on digoxin carvedilol and diltiazem for rate control and his heart rates are better     -hypertension dyslipidemia diabetes peripheral neuropathy     -status post arthroscopic knee surgery     -nonsmoker     -Family history is positive for coronary artery disease     -allergic to penicillin.  =========  Plan  =============  Patient presented with cough shortness of breath and atrial fibrillation which is chronic with RVR.  Wean off intravenous Cardizem.  Start p.o. Lanoxin at 0.25 mg a day.  Patient recently received intravenous digoxin for a total of 0.75 mg as a loading dose.  Patient has borderline blood pressure.    Anticoagulation  Patient was supposed to be taking Coumadin but his INR is very low.  Restart Coumadin at home dose.    Status post ICD  Patient did not have any ICD shocks.  Recent interrogation of the ICD revealed excellent pacing parameters.    Medications were reviewed and updated.  Patient to be on Cardizem  mg a day Coreg 25 mg twice daily and Lanoxin.    Patient has personal issues that he needs to modify including  usage of meth etc. which makes his heart rate faster and potentially can have ICD shocks.    Will adjust medications for rate control and discussed with patient regarding anticoagulation and importance of being on anticoagulation.    Have discussed with attending nurse for coordination of care.    Patient is being discharged to home and followed by the primary cardiologist  ]]]]]]]]]]]]]]]]]]          Mk Santiago MD  03/06/21  10:47 EST

## 2021-03-06 NOTE — DISCHARGE SUMMARY
Date of Discharge:  3/6/2021    Discharge Diagnosis:     Community-acquired pneumonia right lower lobe likely gram-negative organism  Atrial fibrillation with rapid ventricular response  LVH  CHFrEF  Exogenous obesity  History of ICD placement, single chamber [employer]  Hypercoagulable state  Chronic oral anticoagulation therapy  Hyperthyroidism  Vitamin D deficiency  Obesity hypoventilation syndrome  Substance abuse disorder/methamphetamines  Atherosclerotic heart disease of native coronary arteries with angina pectoris  History of ventricular fibrillation with acute cardiac arrest  Hypoventilation apnea syndrome  Peripheral polyneuropathy  Diabetes mellitus type 2 with angiopathic and neuropathic manifestations  Diabetic dyslipidemia  Degenerative joint disease    Presenting Problem/History of Present Illness  Active Hospital Problems    Diagnosis  POA   • Pneumonia of right lower lobe due to infectious organism [J18.9]  Yes      Resolved Hospital Problems   No resolved problems to display.          Hospital Course  Patient is a 55 y.o. male presented with shortness of breath and palpitations with identification of an acute community-acquired pneumonia of the right lower lobe as well as atrial fibrillation with rapid ventricular response.  The patient received antimicrobial therapy as well as aggressive pulmonary toilet and was evaluated by cardiology and placed on parenteral rate control measures.  The patient improved overall and returned to baseline.  Medications were modified and the patient was deemed to be an appropriate candidate for discharge home today with medications per the discharge reconciliation.  He will be strongly encouraged to discontinue use of methamphetamines and all stimulants including caffeine.  We will arrange outpatient follow-up within 1 week with ourselves and with cardiology within 1 month.  He is to call with any recurrent symptoms or with questions.  Once again, the deadly  "nature of his substance use was discussed and he \" taught back\" understanding of its dangers.  He will weigh himself daily and monitor blood pressure at home.  He will call if he has any increase in weight of 3 pounds in 1 day or 5 pounds in 2 days.  He will call with any substernal chest pain orthopnea or evidence of paroxysmal nocturnal dyspnea.  He will be encouraged to follow-up in our office as well with respiratory therapy for review of his hypoventilation apnea syndrome and he will be encouraged to lose weight through work restrictions not to exceed 1800 chaitanya/day.  He will be sent home on oral antibiotics and we will likely repeat a chest x-ray upon return within 1 week.    Procedures Performed  Two-dimensional echocardiogram       Consults:   Consults     Date and Time Order Name Status Description    3/4/2021  9:44 AM Inpatient Cardiology Consult Completed     3/4/2021  8:03 AM Family Medicine Consult Completed     3/4/2021  7:52 AM Hospitalist (on-call MD unless specified) Completed           Pertinent Test Results:XR Chest 1 View    Result Date: 3/4/2021  Multifocal airspace opacities in the right lung. Groundglass opacity in the left lower lobe. This could be seen with multifocal pneumonia. Underlying metastatic disease or malignancy cannot be excluded. Continued imaging and clinical follow-up is recommended. Background of emphysema is suspected.  Electronically Signed By-Antonia Gary MD On:3/4/2021 7:17 AM This report was finalized on [id number] by  Antonia Gary MD.      Imaging Results (Last 7 Days)     Procedure Component Value Units Date/Time    XR Chest 1 View [[id number]] Collected: 03/04/21 0715     Updated: 03/04/21 0719    Narrative:      DATE OF EXAM:  3/4/2021 7:03 AM     PROCEDURE:  XR CHEST 1 VW-     INDICATIONS:  Cough. Evaluate for Covid. Shortness of breath.     COMPARISON:  Chest radiograph 3/16/2019     TECHNIQUE:   Single radiographic view of the chest was obtained.   "   FINDINGS:  Lungs are hyperexpanded. There is a background emphysema. There is a  single lead transvenous pacemaker/AICD device. There is mild  cardiomegaly. There has been development of airspace opacities in the  right lower lobe right hilar location right upper lobe. There is more  patchy groundglass in the left lung. No definite pleural effusion.       Impression:      Multifocal airspace opacities in the right lung. Groundglass opacity in  the left lower lobe. This could be seen with multifocal pneumonia.  Underlying metastatic disease or malignancy cannot be excluded.  Continued imaging and clinical follow-up is recommended.  Background of emphysema is suspected.     Electronically Signed By-Antonia Gary MD On:3/4/2021 7:17 AM  This report was finalized on [id number] by  Antonia Gary MD.              Condition on Discharge:  Fair    Vital Signs  Temp:  [98 °F (36.7 °C)-98.6 °F (37 °C)] 98.4 °F (36.9 °C)  Heart Rate:  [75-96] 96  Resp:  [14-18] 18  BP: (117-145)/(66-86) 126/86    Physical Exam:     General Appearance:    Alert, cooperative, in no acute distress   Head:    Normocephalic, without obvious abnormality, atraumatic   Eyes:           Conjunctivae and sclerae normal, no   icterus, no pallor, corneas clear, PERRLA   Throat:   No oral lesions, no thrush, oral mucosa moist   Neck:   No adenopathy, supple, trachea midline, no thyromegaly, no   carotid bruit, no JVD   Lungs:     Clear to auscultation,respirations regular, even and                  unlabored    Heart:    Regular rhythm and normal rate, normal S1 and S2, no            murmur, no gallop, no rub, no click   Chest Wall:    No abnormalities observed   Abdomen:     Normal bowel sounds, no masses, no organomegaly, soft        non-tender, non-distended, no guarding, no rebound                tenderness   Rectal:     Deferred   Extremities:   Moves all extremities well, no edema, no cyanosis, no             redness   Pulses:   Pulses palpable and  equal bilaterally   Skin:   No bleeding, bruising or rash   Lymph nodes:   No palpable adenopathy   Neurologic:   Cranial nerves 2 - 12 grossly intact, sensation intact, DTR       present and equal bilaterally         Discharge Disposition  Home or Self Care    Discharge Medications     Discharge Medications      New Medications      Instructions Start Date   cefuroxime 500 MG tablet  Commonly known as: CEFTIN   500 mg, Oral, 2 Times Daily      digoxin 250 MCG tablet  Commonly known as: LANOXIN   250 mcg, Oral, Daily Digoxin         Continue These Medications      Instructions Start Date   atorvastatin 40 MG tablet  Commonly known as: LIPITOR   40 mg, Oral, Daily      carvedilol 25 MG tablet  Commonly known as: COREG   25 mg, Oral, 2 Times Daily      dilTIAZem  MG 24 hr tablet  Commonly known as: Matzim [place]   180 mg, Oral, Daily      furosemide 40 MG tablet  Commonly known as: LASIX   TAKE 1 TABLET BY MOUTH EVERY DAY      HumaLOG KwikPen 100 UNIT/ML solution pen-injector  Generic drug: Insulin Lispro (1 Unit Dial)   INJECT 1 UNIT:10 G CARB WITH MEALS 3X/DAY, AND 1 UNIT:15 G CARB WITH SNACKS, MAX OF 70 UNITS/DAY      Insulin Glargine 100 UNIT/ML injection pen  Commonly known as: BASAGLAR KWIKPEN   39 Units, Subcutaneous, Nightly      methIMAzole 10 MG tablet  Commonly known as: TAPAZOLE   Take one tab daily.      valsartan 40 MG tablet  Commonly known as: DIOVAN   TAKE 1 TABLET BY MOUTH EVERY DAY      vitamin D 1.25 MG (71671 UT) capsule capsule  Commonly known as: ERGOCALCIFEROL   50,000 Units, Oral, Weekly      warfarin 5 MG tablet  Commonly known as: COUMADIN   7.5 mg, Oral, 3 Times Weekly, Sun,Tue,Thurs at bed       warfarin 5 MG tablet  Commonly known as: COUMADIN   10 mg, Oral, 4 Times Weekly, Mon,Wed,Fri,Sat in the evening              Discharge Diet:   Cardiac 1800-calorie low vitamin K  Activity at Discharge:   As tolerated  Follow-up Appointments  Refer to the body of the discharge summary  Future  Appointments   Date Time Provider Department Center   6/16/2021  7:30 AM LAB [facility] None   6/23/2021  8:45 AM Josie Montesinos MD [facility] NA None   7/13/2021  9:00 AM [facility], [facility] [place] [facility] NA   7/13/2021  9:30 AM Iliana Rodrigez MD [facility] NA         Test Results Pending at Discharge  Pending Labs     Order Current Status    Blood Culture - Blood, Arm, Left Preliminary result    Blood Culture - Blood, Hand, Left Preliminary result           Gino Baltazar MD  03/06/21  09:02 EST

## 2021-03-06 NOTE — PLAN OF CARE
Goal Outcome Evaluation:  Plan of Care Reviewed With: patient  Progress: improving  Outcome Summary: Pt is still in AFib, but his rate is controlled by PO meds.  Warfarin for anticoagulation.  SS consulted for confirmed drug abuse.  Vital signs are stable.  Continue to monitor

## 2021-03-07 ENCOUNTER — READMISSION MANAGEMENT (OUTPATIENT)
Dept: CALL CENTER | Facility: HOSPITAL | Age: 56
End: 2021-03-07

## 2021-03-07 LAB — QT INTERVAL: 307 MS

## 2021-03-07 NOTE — OUTREACH NOTE
Prep Survey      Responses   [facility] facility patient discharged from?  [facility]   Is LACE score < 7 ?  No   Emergency Room discharge w/ pulse ox?  No   Eligibility  Not Eligible   What are the reasons patient is not eligible?  Other   Does the patient have one of the following disease processes/diagnoses(primary or secondary)?  COPD/Pneumonia   Prep survey completed?  Yes          Diane Haro RN

## 2021-03-08 NOTE — PROGRESS NOTES
Case Management Discharge Note           Selected Continued Care - Discharged on 3/6/2021 Admission date: 3/4/2021 - Discharge disposition: Home or Self Care     Final Discharge Disposition Code: 01 - home or self-care

## 2021-03-08 NOTE — TELEPHONE ENCOUNTER
Called and left  for patient about Lasix RX. [facility] refilled medication and confirmation that patient picked up on February 12th 90 day supply.   Insurance will not pay for anymore refills until April 5th per pharmacy tech.   Advised patient to call back.

## 2021-03-09 LAB
BACTERIA SPEC AEROBE CULT: NORMAL
BACTERIA SPEC AEROBE CULT: NORMAL

## 2021-03-11 ENCOUNTER — TELEPHONE (OUTPATIENT)
Dept: CARDIOLOGY | Facility: CLINIC | Age: 56
End: 2021-03-11

## 2021-03-11 NOTE — TELEPHONE ENCOUNTER
----- Message from EFRA [place] sent at 3/11/2021  8:31 AM CST -----  He is ok to hold his plavix from our standpoint.  Continue aspirin during that time. Thanks.   ----- Message -----  From: Masoud Lyons MA  Sent: 3/9/2021   1:22 PM CST  To: EFRA [place]       Pt presented for INR. Bilateral lower extremities are swollen. Patient has pitting edema at both ankles. He was once on Lasix but it was never refilled. Pt wanted a refill sent to [facility] on [address] [facility].       Please advise

## 2021-03-18 DIAGNOSIS — E10.49 TYPE 1 DIABETES MELLITUS WITH OTHER DIABETIC NEUROLOGICAL COMPLICATION (HCC): ICD-10-CM

## 2021-03-18 RX ORDER — INSULIN LISPRO 100 [IU]/ML
INJECTION, SOLUTION INTRAVENOUS; SUBCUTANEOUS
Qty: 21 PEN | Refills: 10 | Status: SHIPPED | OUTPATIENT
Start: 2021-03-18 | End: 2021-06-24

## 2021-03-24 ENCOUNTER — TELEPHONE (OUTPATIENT)
Dept: CARDIOLOGY | Facility: CLINIC | Age: 56
End: 2021-03-24

## 2021-03-31 ENCOUNTER — TELEPHONE (OUTPATIENT)
Dept: CARDIOLOGY | Facility: CLINIC | Age: 56
End: 2021-03-31

## 2021-04-19 RX ORDER — INSULIN GLARGINE 100 [IU]/ML
INJECTION, SOLUTION SUBCUTANEOUS
Qty: 15 PEN | Refills: 4 | Status: SHIPPED | OUTPATIENT
Start: 2021-04-19 | End: 2021-06-23

## 2021-04-20 ENCOUNTER — TELEPHONE (OUTPATIENT)
Dept: CARDIOLOGY | Facility: CLINIC | Age: 56
End: 2021-04-20

## 2021-04-21 DIAGNOSIS — I48.20 CHRONIC ATRIAL FIBRILLATION (HCC): ICD-10-CM

## 2021-04-21 DIAGNOSIS — Z79.01 LONG TERM (CURRENT) USE OF ANTICOAGULANTS: ICD-10-CM

## 2021-04-21 RX ORDER — ERGOCALCIFEROL 1.25 MG/1
50000 CAPSULE ORAL WEEKLY
Qty: 4 CAPSULE | Refills: 11 | Status: SHIPPED | OUTPATIENT
Start: 2021-04-21

## 2021-04-21 RX ORDER — WARFARIN SODIUM 5 MG/1
TABLET ORAL
Qty: 26 TABLET | Refills: 0 | Status: SHIPPED | OUTPATIENT
Start: 2021-04-21 | End: 2021-06-07 | Stop reason: SDUPTHER

## 2021-04-21 NOTE — TELEPHONE ENCOUNTER
Called pt to remind of INR. Rec'd Warfarin refill request no answer and no VM Box apLPN       Reminder Letter mailed to mailed to patient 4/21/21 apLPN

## 2021-05-05 ENCOUNTER — TELEPHONE (OUTPATIENT)
Dept: CARDIOLOGY | Facility: CLINIC | Age: 56
End: 2021-05-05

## 2021-05-19 ENCOUNTER — TELEPHONE (OUTPATIENT)
Dept: CARDIOLOGY | Facility: CLINIC | Age: 56
End: 2021-05-19

## 2021-05-19 NOTE — TELEPHONE ENCOUNTER
Called patient, mailbox full on cell number. Called home number and left message on machine to call for apt.

## 2021-05-23 PROCEDURE — 93295 DEV INTERROG REMOTE 1/2/MLT: CPT | Performed by: INTERNAL MEDICINE

## 2021-05-23 PROCEDURE — 93296 REM INTERROG EVL PM/IDS: CPT | Performed by: INTERNAL MEDICINE

## 2021-06-07 ENCOUNTER — ANTICOAGULATION VISIT (OUTPATIENT)
Dept: CARDIOLOGY | Facility: CLINIC | Age: 56
End: 2021-06-07

## 2021-06-07 VITALS
BODY MASS INDEX: 31.55 KG/M2 | HEART RATE: 110 BPM | SYSTOLIC BLOOD PRESSURE: 130 MMHG | DIASTOLIC BLOOD PRESSURE: 90 MMHG | WEIGHT: 273 LBS

## 2021-06-07 DIAGNOSIS — I49.01 CARDIAC ARREST WITH VENTRICULAR FIBRILLATION (HCC): Primary | ICD-10-CM

## 2021-06-07 DIAGNOSIS — I48.20 CHRONIC ATRIAL FIBRILLATION (HCC): Primary | ICD-10-CM

## 2021-06-07 DIAGNOSIS — Z79.01 LONG TERM (CURRENT) USE OF ANTICOAGULANTS: ICD-10-CM

## 2021-06-07 DIAGNOSIS — I46.9 CARDIAC ARREST WITH VENTRICULAR FIBRILLATION (HCC): Primary | ICD-10-CM

## 2021-06-07 LAB — INR PPP: 1 (ref 0.9–1.1)

## 2021-06-07 PROCEDURE — 85610 PROTHROMBIN TIME: CPT | Performed by: INTERNAL MEDICINE

## 2021-06-07 PROCEDURE — 36416 COLLJ CAPILLARY BLOOD SPEC: CPT | Performed by: INTERNAL MEDICINE

## 2021-06-07 RX ORDER — WARFARIN SODIUM 5 MG/1
TABLET ORAL
Qty: 180 TABLET | Refills: 0 | Status: SHIPPED | OUTPATIENT
Start: 2021-06-07 | End: 2021-07-19

## 2021-06-07 RX ORDER — DIGOXIN 250 MCG
250 TABLET ORAL
Qty: 30 TABLET | Refills: 2 | Status: SHIPPED | OUTPATIENT
Start: 2021-06-07

## 2021-06-17 ENCOUNTER — LAB (OUTPATIENT)
Dept: LAB | Facility: HOSPITAL | Age: 56
End: 2021-06-17

## 2021-06-17 DIAGNOSIS — E05.90 HYPERTHYROIDISM: ICD-10-CM

## 2021-06-17 DIAGNOSIS — E55.9 VITAMIN D DEFICIENCY: ICD-10-CM

## 2021-06-17 DIAGNOSIS — E10.49 TYPE 1 DIABETES MELLITUS WITH OTHER DIABETIC NEUROLOGICAL COMPLICATION (HCC): ICD-10-CM

## 2021-06-17 DIAGNOSIS — E78.2 MIXED HYPERLIPIDEMIA: ICD-10-CM

## 2021-06-17 DIAGNOSIS — Z79.899 LONG TERM USE OF DRUG: ICD-10-CM

## 2021-06-17 LAB
25(OH)D3 SERPL-MCNC: 26.5 NG/ML
ALBUMIN SERPL-MCNC: 4 G/DL (ref 3.5–5.2)
ALBUMIN UR-MCNC: <1.2 MG/DL
ALBUMIN/GLOB SERPL: 2.2 G/DL
ALP SERPL-CCNC: 120 U/L (ref 39–117)
ALT SERPL W P-5'-P-CCNC: 30 U/L (ref 1–41)
ANION GAP SERPL CALCULATED.3IONS-SCNC: 10.1 MMOL/L (ref 5–15)
AST SERPL-CCNC: 32 U/L (ref 1–40)
BASOPHILS # BLD AUTO: 0.06 10*3/MM3 (ref 0–0.2)
BASOPHILS NFR BLD AUTO: 0.9 % (ref 0–1.5)
BILIRUB SERPL-MCNC: 0.3 MG/DL (ref 0–1.2)
BUN SERPL-MCNC: 14 MG/DL (ref 6–20)
BUN/CREAT SERPL: 15.7 (ref 7–25)
CALCIUM SPEC-SCNC: 8.5 MG/DL (ref 8.6–10.5)
CHLORIDE SERPL-SCNC: 101 MMOL/L (ref 98–107)
CHOLEST SERPL-MCNC: 124 MG/DL (ref 0–200)
CO2 SERPL-SCNC: 24.9 MMOL/L (ref 22–29)
CREAT SERPL-MCNC: 0.89 MG/DL (ref 0.76–1.27)
CREAT UR-MCNC: 77.8 MG/DL
DEPRECATED RDW RBC AUTO: 47.8 FL (ref 37–54)
EOSINOPHIL # BLD AUTO: 0.37 10*3/MM3 (ref 0–0.4)
EOSINOPHIL NFR BLD AUTO: 5.6 % (ref 0.3–6.2)
ERYTHROCYTE [DISTWIDTH] IN BLOOD BY AUTOMATED COUNT: 12.7 % (ref 12.3–15.4)
GFR SERPL CREATININE-BSD FRML MDRD: 89 ML/MIN/1.73
GLOBULIN UR ELPH-MCNC: 1.8 GM/DL
GLUCOSE SERPL-MCNC: 304 MG/DL (ref 65–99)
HBA1C MFR BLD: 10 % (ref 3.5–5.6)
HCT VFR BLD AUTO: 39.2 % (ref 37.5–51)
HDLC SERPL-MCNC: 52 MG/DL (ref 40–60)
HGB BLD-MCNC: 13.1 G/DL (ref 13–17.7)
IMM GRANULOCYTES # BLD AUTO: 0.02 10*3/MM3 (ref 0–0.05)
IMM GRANULOCYTES NFR BLD AUTO: 0.3 % (ref 0–0.5)
LDLC SERPL CALC-MCNC: 56 MG/DL (ref 0–100)
LDLC/HDLC SERPL: 1.07 {RATIO}
LYMPHOCYTES # BLD AUTO: 1.49 10*3/MM3 (ref 0.7–3.1)
LYMPHOCYTES NFR BLD AUTO: 22.5 % (ref 19.6–45.3)
MCH RBC QN AUTO: 33.9 PG (ref 26.6–33)
MCHC RBC AUTO-ENTMCNC: 33.4 G/DL (ref 31.5–35.7)
MCV RBC AUTO: 101.6 FL (ref 79–97)
MICROALBUMIN/CREAT UR: NORMAL MG/G{CREAT}
MONOCYTES # BLD AUTO: 0.57 10*3/MM3 (ref 0.1–0.9)
MONOCYTES NFR BLD AUTO: 8.6 % (ref 5–12)
NEUTROPHILS NFR BLD AUTO: 4.1 10*3/MM3 (ref 1.7–7)
NEUTROPHILS NFR BLD AUTO: 62.1 % (ref 42.7–76)
NRBC BLD AUTO-RTO: 0 /100 WBC (ref 0–0.2)
PLATELET # BLD AUTO: 183 10*3/MM3 (ref 140–450)
PMV BLD AUTO: 10.6 FL (ref 6–12)
POTASSIUM SERPL-SCNC: 4 MMOL/L (ref 3.5–5.2)
PROT SERPL-MCNC: 5.8 G/DL (ref 6–8.5)
RBC # BLD AUTO: 3.86 10*6/MM3 (ref 4.14–5.8)
SODIUM SERPL-SCNC: 136 MMOL/L (ref 136–145)
T4 FREE SERPL-MCNC: 2.24 NG/DL (ref 0.93–1.7)
TRIGL SERPL-MCNC: 82 MG/DL (ref 0–150)
TSH SERPL DL<=0.05 MIU/L-ACNC: <0.005 UIU/ML (ref 0.27–4.2)
VLDLC SERPL-MCNC: 16 MG/DL (ref 5–40)
WBC # BLD AUTO: 6.61 10*3/MM3 (ref 3.4–10.8)

## 2021-06-17 PROCEDURE — 80061 LIPID PANEL: CPT

## 2021-06-17 PROCEDURE — 83036 HEMOGLOBIN GLYCOSYLATED A1C: CPT

## 2021-06-17 PROCEDURE — 82570 ASSAY OF URINE CREATININE: CPT

## 2021-06-17 PROCEDURE — 82306 VITAMIN D 25 HYDROXY: CPT

## 2021-06-17 PROCEDURE — 85025 COMPLETE CBC W/AUTO DIFF WBC: CPT

## 2021-06-17 PROCEDURE — 82043 UR ALBUMIN QUANTITATIVE: CPT

## 2021-06-17 PROCEDURE — 84443 ASSAY THYROID STIM HORMONE: CPT

## 2021-06-17 PROCEDURE — 80053 COMPREHEN METABOLIC PANEL: CPT

## 2021-06-17 PROCEDURE — 36415 COLL VENOUS BLD VENIPUNCTURE: CPT

## 2021-06-17 PROCEDURE — 84439 ASSAY OF FREE THYROXINE: CPT

## 2021-06-23 ENCOUNTER — TELEPHONE (OUTPATIENT)
Dept: ENDOCRINOLOGY | Facility: CLINIC | Age: 56
End: 2021-06-23

## 2021-06-23 ENCOUNTER — OFFICE VISIT (OUTPATIENT)
Dept: ENDOCRINOLOGY | Facility: CLINIC | Age: 56
End: 2021-06-23

## 2021-06-23 VITALS
HEART RATE: 101 BPM | WEIGHT: 277 LBS | OXYGEN SATURATION: 99 % | DIASTOLIC BLOOD PRESSURE: 60 MMHG | SYSTOLIC BLOOD PRESSURE: 100 MMHG | HEIGHT: 78 IN | BODY MASS INDEX: 32.05 KG/M2 | TEMPERATURE: 96.9 F

## 2021-06-23 DIAGNOSIS — E78.2 MIXED HYPERLIPIDEMIA: ICD-10-CM

## 2021-06-23 DIAGNOSIS — E55.9 VITAMIN D DEFICIENCY: ICD-10-CM

## 2021-06-23 DIAGNOSIS — E10.65 TYPE 1 DIABETES MELLITUS WITH HYPERGLYCEMIA (HCC): Primary | ICD-10-CM

## 2021-06-23 DIAGNOSIS — E05.90 HYPERTHYROIDISM: ICD-10-CM

## 2021-06-23 LAB — GLUCOSE BLDC GLUCOMTR-MCNC: 290 MG/DL (ref 70–105)

## 2021-06-23 PROCEDURE — 99214 OFFICE O/P EST MOD 30 MIN: CPT | Performed by: INTERNAL MEDICINE

## 2021-06-23 PROCEDURE — 82962 GLUCOSE BLOOD TEST: CPT | Performed by: INTERNAL MEDICINE

## 2021-06-23 RX ORDER — INSULIN GLARGINE 100 [IU]/ML
INJECTION, SOLUTION SUBCUTANEOUS
Start: 2021-06-23

## 2021-06-23 NOTE — TELEPHONE ENCOUNTER
Patient states it will be cheaper at his pharmacy for him to get the Humalog as generic. He states he can get the Lispro for $100 for a box, they stopped breaking boxes. He is asking if you are OK with him getting the generic of it. I did give him one of the pens we had in our sample stock. He states if generic is OK for him to take to please send the prescription to [facility] as a 30 day supply.

## 2021-06-23 NOTE — PATIENT INSTRUCTIONS
Keep up the good work!  Restart methimazole one a day.  Increase Basaglar to 34 units @ 3 pm.  Continue exercise.  Continue chol meds & vit D supplements.  Call if blood sugars are running under 100 or over 200.  F/u in 6 months, with labs prior.

## 2021-06-24 RX ORDER — INSULIN LISPRO 100 [IU]/ML
INJECTION, SOLUTION INTRAVENOUS; SUBCUTANEOUS
Qty: 15 ML | Refills: 3 | Status: SHIPPED | OUTPATIENT
Start: 2021-06-24

## 2021-06-24 NOTE — PROGRESS NOTES
[facility]        Patient Care Team:  Lluvia Sarabia APRN as PCP - General  Josie Montesinos MD as Consulting Physician (Endocrinology)  Iliana Rodrigez MD as Consulting Physician (Cardiology)    Chief Complaint:    Chief Complaint   Patient presents with   • Diabetes     Type 1,  fasting, Ketones trace         Subjective   Here for diabetes f/u  Blood sugars: higher in am  Exercise program: playing golf  Taking otc vit D  Stopped methimazole after last visit, instead of just decreasing it     Interval History:     Patient Complaints: Quit stressful job. Interviewing for new job. No insurance yet  Patient Denies:  hypoglycemia  History taken from: patient    Review of Systems:   Review of Systems   Eyes: Negative for blurred vision.   Cardiovascular: Negative for palpitations.   Gastrointestinal: Negative for nausea.   Endocrine: Negative for polyuria.   Neurological: Negative for tremors and headache.     Lost  6 lb since last visit    Objective     Vital Signs  Temp:  [96.9 °F (36.1 °C)] 96.9 °F (36.1 °C)  Heart Rate:  [101] 101  BP: (100)/(60) 100/60    Physical Exam:     General Appearance:    Alert, cooperative, in no acute distress. Obese   Head:    Normocephalic, without obvious abnormality, atraumatic   Eyes:            Lids and lashes normal, conjunctivae and sclerae normal, no   icterus, no pallor, corneas clear, PERRLA   Throat:   No oral lesions,  oral mucosa moist   Neck:   No adenopathy, supple,  no thyromegaly, no   carotid bruit   Lungs:     Clear    Heart:    Regular rhythm and normal rate   Chest Wall:    No abnormalities observed   Abdomen:     Normal bowel sounds, soft                 Extremities:   Plantar calluses, 1+ edema               Pulses:   Pulses palpable and equal bilaterally   Skin:   Dry   Neurologic:  DTR absent, able to feel the 10g monofilament          Results Review:    I have reviewed the patient's new clinical results, labs &  imaging.    Medication Review:   Prior to Admission medications    Medication Sig Start Date End Date Taking? Authorizing Provider   atorvastatin (LIPITOR) 40 MG tablet Take 40 mg by mouth Daily.   Yes Provider, MD Geovanni   carvedilol (COREG) 25 MG tablet Take 1 tablet by mouth 2 (Two) Times a Day. 11/6/20  Yes Iliana Rodrigez MD   digoxin (LANOXIN) 250 MCG tablet Take 1 tablet by mouth Daily. 6/7/21  Yes Iliana Rodrigez MD   furosemide (LASIX) 40 MG tablet TAKE 1 TABLET BY MOUTH EVERY DAY 3/1/21  Yes Iliana Rodrigez MD   HumaLOG KwikPen 100 UNIT/ML solution pen-injector INJECT 1 UNIT:10 G CARB WITH MEALS 3X/DAY, AND 1 UNIT:15 G CARB WITH SNACKS, MAX OF 70 UNITS/DAY 3/18/21  Yes Josie Montesinos MD   Insulin Glargine (BASAGLAR KWIKPEN) 100 UNIT/ML injection pen Inject 34 units daily @ 3 pm. 6/23/21  Yes Josie Montesinos MD   methIMAzole (TAPAZOLE) 10 MG tablet Take one tab daily. 12/27/20  Yes Josie Montesinos MD   valsartan (DIOVAN) 40 MG tablet TAKE 1 TABLET BY MOUTH EVERY DAY 1/4/21  Yes Iliana Rodrigez MD   vitamin D (ERGOCALCIFEROL) 1.25 MG (55245 UT) capsule capsule TAKE 1 CAPSULE BY MOUTH 1 (ONE) TIME PER WEEK. 4/21/21  Yes Josie Montesinos MD   warfarin (COUMADIN) 5 MG tablet Take one and one half tablet by mouth Sunday Tuesday and Thursday and 2 tablets by mouth all other days or as directed. 6/7/21  Yes Iliana Rodrigez MD       Lab Results (most recent)     Procedure Component Value Units Date/Time    POC Glucose [[id number]]  (Abnormal) Collected: 06/23/21 0849    Specimen: Blood Updated: 06/23/21 0850     Glucose 290 mg/dL      Comment: Serial Number: [id number]:  [id number]           Lab Results   Component Value Date    HGBA1C 10.0 (H) 06/17/2021    HGBA1C 10.4 (H) 03/05/2021    HGBA1C 12.7 (H) 12/22/2020      Lab Results   Component Value Date    GLUCOSE 304 (H) 06/17/2021    BUN 14 06/17/2021    CREATININE 0.89 06/17/2021    EGFRIFNONA 89 06/17/2021    BCR 15.7 06/17/2021     K 4.0 06/17/2021    CO2 24.9 06/17/2021    CALCIUM 8.5 (L) 06/17/2021    ALBUMIN 4.00 06/17/2021    LABIL2 1.3 03/27/2019    AST 32 06/17/2021    ALT 30 06/17/2021    CHOL 124 06/17/2021    LDL 56 06/17/2021    HDL 52 06/17/2021    TRIG 82 06/17/2021     Lab Results   Component Value Date    TSH <0.005 (L) 06/17/2021    FREET4 2.24 (H) 06/17/2021    KKXS88YL 26.5 06/17/2021       Assessment/Plan     Diagnoses and all orders for this visit:    1. Type 1 diabetes mellitus with hyperglycemia (CMS/[facility]) (Primary)  -     Insulin Glargine (BASAGLAR KWIKPEN) 100 UNIT/ML injection pen; Inject 34 units daily @ 3 pm.  -     Hemoglobin A1c; Future  -     Insulin Lispro, 1 Unit Dial, (HUMALOG) 100 UNIT/ML solution pen-injector; Inject 1 unit per 10 gm CHO ac tid; 1 unit per 15 gm CHO for snacks. Max 60 units/d.  Dispense: 15 mL; Refill: 3    2. Vitamin D deficiency  -     Vitamin D 25 Hydroxy; Future    3. Hyperthyroidism  -     TSH; Future  -     T4, Free; Future    4. Mixed hyperlipidemia  -     Comprehensive Metabolic Panel; Future    Other orders  -     POC Glucose    Glucose control improved. Lipids & vit D stable. Thyroid worse.    Restart methimazole one a day ( has at home ).  Increase Basaglar to 34 units @ 3 pm. Given Toujeo Max samples due to no insurance.  Change Humalog to generic lispro for cost savings.  Continue exercise.  Continue chol meds & vit D supplements.  Call if blood sugars are running under 100 or over 200.        Josie Montesinos MD  06/24/21  00:55 EDT

## 2021-06-28 ENCOUNTER — ANTICOAGULATION VISIT (OUTPATIENT)
Dept: CARDIOLOGY | Facility: CLINIC | Age: 56
End: 2021-06-28

## 2021-06-28 VITALS
DIASTOLIC BLOOD PRESSURE: 69 MMHG | WEIGHT: 287 LBS | HEART RATE: 84 BPM | SYSTOLIC BLOOD PRESSURE: 102 MMHG | BODY MASS INDEX: 33.17 KG/M2

## 2021-06-28 DIAGNOSIS — I46.9 CARDIAC ARREST WITH VENTRICULAR FIBRILLATION (HCC): Primary | ICD-10-CM

## 2021-06-28 DIAGNOSIS — I49.01 CARDIAC ARREST WITH VENTRICULAR FIBRILLATION (HCC): Primary | ICD-10-CM

## 2021-06-28 DIAGNOSIS — Z79.01 LONG TERM (CURRENT) USE OF ANTICOAGULANTS: ICD-10-CM

## 2021-06-28 LAB — INR PPP: 3.8 (ref 0.9–1.1)

## 2021-06-28 PROCEDURE — 85610 PROTHROMBIN TIME: CPT | Performed by: INTERNAL MEDICINE

## 2021-06-28 PROCEDURE — 36416 COLLJ CAPILLARY BLOOD SPEC: CPT | Performed by: INTERNAL MEDICINE

## 2021-06-28 RX ORDER — CARVEDILOL 25 MG/1
25 TABLET ORAL 2 TIMES DAILY
Qty: 60 TABLET | Refills: 0 | Status: SHIPPED | OUTPATIENT
Start: 2021-06-28 | End: 2021-08-19 | Stop reason: SDUPTHER

## 2021-06-28 RX ORDER — FUROSEMIDE 40 MG/1
40 TABLET ORAL DAILY
Qty: 90 TABLET | Refills: 3 | Status: SHIPPED | OUTPATIENT
Start: 2021-06-28

## 2021-07-15 ENCOUNTER — ANTICOAGULATION VISIT (OUTPATIENT)
Dept: CARDIOLOGY | Facility: CLINIC | Age: 56
End: 2021-07-15

## 2021-07-15 ENCOUNTER — OFFICE VISIT (OUTPATIENT)
Dept: CARDIOLOGY | Facility: CLINIC | Age: 56
End: 2021-07-15

## 2021-07-15 ENCOUNTER — CLINICAL SUPPORT NO REQUIREMENTS (OUTPATIENT)
Dept: CARDIOLOGY | Facility: CLINIC | Age: 56
End: 2021-07-15

## 2021-07-15 VITALS
WEIGHT: 284 LBS | HEART RATE: 101 BPM | DIASTOLIC BLOOD PRESSURE: 93 MMHG | SYSTOLIC BLOOD PRESSURE: 118 MMHG | BODY MASS INDEX: 32.82 KG/M2

## 2021-07-15 VITALS
HEART RATE: 101 BPM | BODY MASS INDEX: 32.86 KG/M2 | SYSTOLIC BLOOD PRESSURE: 118 MMHG | WEIGHT: 284 LBS | DIASTOLIC BLOOD PRESSURE: 93 MMHG | HEIGHT: 78 IN

## 2021-07-15 DIAGNOSIS — I48.20 CHRONIC ATRIAL FIBRILLATION (HCC): ICD-10-CM

## 2021-07-15 DIAGNOSIS — Z95.810 PRESENCE OF AUTOMATIC CARDIOVERTER/DEFIBRILLATOR (AICD): Primary | ICD-10-CM

## 2021-07-15 DIAGNOSIS — I49.01 CARDIAC ARREST WITH VENTRICULAR FIBRILLATION (HCC): ICD-10-CM

## 2021-07-15 DIAGNOSIS — I25.5 ISCHEMIC CARDIOMYOPATHY: ICD-10-CM

## 2021-07-15 DIAGNOSIS — I48.91 ATRIAL FIBRILLATION, UNSPECIFIED TYPE (HCC): ICD-10-CM

## 2021-07-15 DIAGNOSIS — Z79.01 LONG TERM (CURRENT) USE OF ANTICOAGULANTS: ICD-10-CM

## 2021-07-15 DIAGNOSIS — I46.9 CARDIAC ARREST WITH VENTRICULAR FIBRILLATION (HCC): Primary | ICD-10-CM

## 2021-07-15 DIAGNOSIS — I42.0 DILATED CARDIOMYOPATHY (HCC): ICD-10-CM

## 2021-07-15 DIAGNOSIS — I46.9 CARDIAC ARREST WITH VENTRICULAR FIBRILLATION (HCC): ICD-10-CM

## 2021-07-15 DIAGNOSIS — I49.01 CARDIAC ARREST WITH VENTRICULAR FIBRILLATION (HCC): Primary | ICD-10-CM

## 2021-07-15 DIAGNOSIS — Z95.810 PRESENCE OF BIVENTRICULAR IMPLANTABLE CARDIOVERTER-DEFIBRILLATOR (ICD): ICD-10-CM

## 2021-07-15 DIAGNOSIS — E78.2 MIXED HYPERLIPIDEMIA: ICD-10-CM

## 2021-07-15 DIAGNOSIS — I10 ESSENTIAL HYPERTENSION: ICD-10-CM

## 2021-07-15 LAB — INR PPP: 1.9 (ref 0.9–1.1)

## 2021-07-15 PROCEDURE — 93282 PRGRMG EVAL IMPLANTABLE DFB: CPT | Performed by: INTERNAL MEDICINE

## 2021-07-15 PROCEDURE — 85610 PROTHROMBIN TIME: CPT | Performed by: INTERNAL MEDICINE

## 2021-07-15 PROCEDURE — 99214 OFFICE O/P EST MOD 30 MIN: CPT | Performed by: INTERNAL MEDICINE

## 2021-07-15 PROCEDURE — 36416 COLLJ CAPILLARY BLOOD SPEC: CPT | Performed by: INTERNAL MEDICINE

## 2021-07-15 PROCEDURE — 93000 ELECTROCARDIOGRAM COMPLETE: CPT | Performed by: INTERNAL MEDICINE

## 2021-07-15 NOTE — PROGRESS NOTES
Encounter Date:07/15/2021  Last seen 3/5/2021      Patient ID: Shannan Arguelles is a 56 y.o. male.    Chief Complaint:  Cardiomyopathy  Status post ICD  Chronic atrial fibrillation  Anticoagulation management      History of Present Illness  Patient has been having increased heart rate and having some shortness of breath with exertion.    Since I have last seen, the patient has been without any chest discomfort palpitations, dizziness or syncope.  Denies having any headache ,abdominal pain ,nausea, vomiting , diarrhea constipation, loss of weight or loss of appetite.  Denies having any excessive bruising ,hematuria or blood in the stool.    Review of all systems negative except as indicated.    Reviewed ROS.    Denies having any ICD shocks.  Assessment and Plan       ]]]]]]]]]]]]]]]]]]]   impression  =========   -   Status postcardiac arrest with ventricular fibrillation probably related to cocaine and cardiomyopathy March of 2018     -status post single-chamber ICD ([employer] ) March 2018 by Dr. Jauregui at [facility], [place], operated by [facility] .  VVI at 40 per minute.  Status post ICD shock x3 March 2019 due to atrial fibrillation with a rapid ventricular response.  Status post ICD shock due to atrial fibrillation with rapid ventricular response 1/24/2020     -Nonischemic cardiomyopathy probably secondary to cocaine use  Cardiac catheterization 03/15/2018 -nonobstructive disease - Dr. Moya at [facility], [place], operated by [facility]     -status post ICD 03/16/2018     - chronic atrial fibrillation on Coumadin.    Anticoagulation-patient is on Coumadin.     -hypertension dyslipidemia diabetes peripheral neuropathy     -status post arthroscopic knee surgery     -nonsmoker     -Family history is positive for coronary artery disease     -allergic to penicillin.  =========  Plan  =============  Nonischemic cardiomyopathy.  Patient is not having any congestive heart failure.    Chronic atrial fibrillation.  Rate is faster.  Patient is on Lanoxin  0.25 mg a day Coreg 25 mg twice a day.  Decrease valsartan to 20 mg a day (40 mg tablet).  Started Cardizem CD to 40 mg a day.    Anticoagulation  Continue Coumadin.  INR today 1.9.  Adjust dosage.    Status post ICD  Patient did not have any ICD shocks.  Recent interrogation of the ICD revealed excellent pacing parameters.      Patient has personal issues that he needs to modify including usage of meth etc. which makes his heart rate faster and potentially can have ICD shocks.     Follow-up in the office in 6 months with ICD interrogation    Further plan will depend on patient's progress.  ]]]]]]]]]]]]]]]]]]           Diagnosis Plan   1. Presence of automatic cardioverter/defibrillator (AICD)     2. Ischemic cardiomyopathy     3. Cardiac arrest with ventricular fibrillation (CMS/HCC)     4. Long term (current) use of anticoagulants     5. Chronic atrial fibrillation (CMS/HCC)     6. Presence of biventricular implantable cardioverter-defibrillator (ICD)     7. Mixed hyperlipidemia     8. Essential hypertension     9. Dilated cardiomyopathy (CMS/HCC)     LAB RESULTS (LAST 7 DAYS)    CBC        BMP        CMP         BNP        TROPONIN        CoAg  Results from last 7 days   Lab Units 07/15/21  1532   INR  1.90*       Creatinine Clearance  Estimated Creatinine Clearance: 140.6 mL/min (by C-G formula based on SCr of 0.89 mg/dL).    ABG        Radiology  No radiology results for the last day                The following portions of the patient's history were reviewed and updated as appropriate: allergies, current medications, past family history, past medical history, past social history, past surgical history and problem list.    Review of Systems   Constitutional: Negative for malaise/fatigue.   Cardiovascular: Positive for leg swelling. Negative for chest pain, palpitations and syncope.   Respiratory: Positive for shortness of breath (at times with exertion).    Skin: Negative for rash.   Gastrointestinal: Negative  for nausea and vomiting.   Neurological: Negative for dizziness, light-headedness and numbness.         Current Outpatient Medications:   •  atorvastatin (LIPITOR) 40 MG tablet, Take 40 mg by mouth Daily., Disp: , Rfl:   •  carvedilol (COREG) 25 MG tablet, Take 1 tablet by mouth 2 (Two) Times a Day., Disp: 60 tablet, Rfl: 0  •  digoxin (LANOXIN) 250 MCG tablet, Take 1 tablet by mouth Daily., Disp: 30 tablet, Rfl: 2  •  furosemide (LASIX) 40 MG tablet, Take 1 tablet by mouth Daily., Disp: 90 tablet, Rfl: 3  •  Insulin Glargine (BASAGLAR KWIKPEN) 100 UNIT/ML injection pen, Inject 34 units daily @ 3 pm., Disp: , Rfl:   •  Insulin Lispro, 1 Unit Dial, (HUMALOG) 100 UNIT/ML solution pen-injector, Inject 1 unit per 10 gm CHO ac tid; 1 unit per 15 gm CHO for snacks. Max 60 units/d., Disp: 15 mL, Rfl: 3  •  methIMAzole (TAPAZOLE) 10 MG tablet, Take one tab daily., Disp: , Rfl:   •  valsartan (DIOVAN) 40 MG tablet, TAKE 1 TABLET BY MOUTH EVERY DAY, Disp: 90 tablet, Rfl: 3  •  vitamin D (ERGOCALCIFEROL) 1.25 MG (71138 UT) capsule capsule, TAKE 1 CAPSULE BY MOUTH 1 (ONE) TIME PER WEEK., Disp: 4 capsule, Rfl: 11  •  warfarin (COUMADIN) 5 MG tablet, Take one and one half tablet by mouth Sunday Tuesday and Thursday and 2 tablets by mouth all other days or as directed., Disp: 180 tablet, Rfl: 0    Allergies   Allergen Reactions   • Penicillins Rash       Family History   Problem Relation Age of Onset   • Alcohol abuse Maternal Aunt    • No Known Problems Mother    • Heart disease Father    • Atrial fibrillation Father    • No Known Problems Maternal Grandmother    • No Known Problems Maternal Grandfather    • No Known Problems Paternal Grandmother    • No Known Problems Paternal Grandfather        Past Surgical History:   Procedure Laterality Date   • CARDIAC CATHETERIZATION N/A 3/15/2018    Procedure: Left Heart Cath and right heart cath;  Surgeon: Valentin Moya MD;  Location: [facility] INVASIVE LOCATION;  Service:  Cardiovascular   • CARDIAC ELECTROPHYSIOLOGY PROCEDURE N/A 3/16/2018    Procedure: Device Implant-[employer]    ICD;  Surgeon: Robert Jauregui MD;  Location:  [facility] CATH INVASIVE LOCATION;  Service: Cardiovascular   • CARDIAC ELECTROPHYSIOLOGY PROCEDURE N/A 2018    Procedure: NIPS  Threshold test of his ICD-[place]  Needs to be scheduled once he has had therapeutic INR's for 3-4 weeks in a row.;  Surgeon: Robert Jauregui MD;  Location:  [facility] INVASIVE LOCATION;  Service: Cardiology   • CARDIAC ELECTROPHYSIOLOGY PROCEDURE N/A 2018    Procedure: Cardioversion;  Surgeon: Robert Jauregui MD;  Location:  [facility] INVASIVE LOCATION;  Service: Cardiology   • COLONOSCOPY     • INSERT / REPLACE / REMOVE PACEMAKER      ICD [place] PLACED 3/16/18   • KNEE ARTHROSCOPY Bilateral        Past Medical History:   Diagnosis Date   • Acute respiratory failure (CMS/HCC)    • Arrhythmia    • Atrial fibrillation (CMS/HCC)    • Cardiac arrest with ventricular fibrillation (CMS/HCC)    • Cardiomyopathy (CMS/HCC)    • Diabetes mellitus (CMS/HCC)    • Diabetes mellitus type I (CMS/HCC)    • Hyperlipidemia    • Hypertension    • Peripheral neuropathy    • Pneumonia    • Sleep apnea    • Substance abuse (CMS/HCC)        Family History   Problem Relation Age of Onset   • Alcohol abuse Maternal Aunt    • No Known Problems Mother    • Heart disease Father    • Atrial fibrillation Father    • No Known Problems Maternal Grandmother    • No Known Problems Maternal Grandfather    • No Known Problems Paternal Grandmother    • No Known Problems Paternal Grandfather        Social History     Socioeconomic History   • Marital status: Single     Spouse name: Not on file   • Number of children: Not on file   • Years of education: Not on file   • Highest education level: Not on file   Tobacco Use   • Smoking status: Former Smoker     Years: 4.00     Types: Cigarettes     Quit date:      Years since quittin.5   • Smokeless tobacco: Never  "Used   Vaping Use   • Vaping Use: Never used   Substance and Sexual Activity   • Alcohol use: Yes     Alcohol/week: 2.0 standard drinks     Types: 2 Cans of beer per week     Comment: occassionally   • Drug use: Yes     Types: Cocaine(coke)     Comment: meth about 4 days ago   • Sexual activity: Defer           ECG 12 Lead    Date/Time: 7/15/2021 4:33 PM  Performed by: Iliana Rodrigez MD  Authorized by: Iliana Rodrigez MD   Comparison: compared with previous ECG   Similar to previous ECG  Comparison to previous ECG: Atrial fibrillation with controlled ventricular response 100/min nonspecific ST-T wave changes left anterior physical block no ectopy compared to 3/4/2021 rate is slower.                Objective:       Physical Exam    /93   Pulse 101   Ht 198.1 cm (78\")   Wt 129 kg (284 lb)   BMI 32.82 kg/m²   The patient is alert, oriented and in no distress.    Vital signs as noted above.    Head and neck revealed no carotid bruits or jugular venous distension.  No thyromegaly or lymphadenopathy is present.    Lungs clear.  No wheezing.  Breath sounds are normal bilaterally.    Heart normal first and second heart sounds.  No murmur..  No pericardial rub is present.  No gallop is present.    Abdomen soft and nontender.  No organomegaly is present.    Extremities revealed good peripheral pulses without any pedal edema.    Skin warm and dry.  ICD site looks normal.    Musculoskeletal system is grossly normal.    CNS grossly normal.        "

## 2021-07-17 DIAGNOSIS — I48.20 CHRONIC ATRIAL FIBRILLATION (HCC): ICD-10-CM

## 2021-07-17 DIAGNOSIS — Z79.01 LONG TERM (CURRENT) USE OF ANTICOAGULANTS: ICD-10-CM

## 2021-07-19 RX ORDER — WARFARIN SODIUM 5 MG/1
TABLET ORAL
Qty: 180 TABLET | Refills: 0 | Status: SHIPPED | OUTPATIENT
Start: 2021-07-19 | End: 2021-08-19 | Stop reason: SDUPTHER

## 2021-07-21 ENCOUNTER — TELEPHONE (OUTPATIENT)
Dept: CARDIOLOGY | Facility: CLINIC | Age: 56
End: 2021-07-21

## 2021-07-21 NOTE — TELEPHONE ENCOUNTER
Called patient, left message on machine to return call. Need to have labs done to check fluid and electrolytes, Magnesium and Potassium. With the physical work and heat, need to check labs, can be cause of tachycardia. Please call office to let me know where to order labs and will need to make appt with Dr Rodrigez also.

## 2021-07-21 NOTE — TELEPHONE ENCOUNTER
Called patient, LMOM checking to see how patient is doing. Also need to get labs ordered and an MD appt.

## 2021-07-23 NOTE — TELEPHONE ENCOUNTER
Called patient, JENNIFER to return call, wanted to check on him and schedule appt and labs. I have patient several times, he has not returned call or scheduled appt.    Called patient's place of employment, he is at work today.

## 2021-08-19 ENCOUNTER — ANTICOAGULATION VISIT (OUTPATIENT)
Dept: CARDIOLOGY | Facility: CLINIC | Age: 56
End: 2021-08-19

## 2021-08-19 VITALS — DIASTOLIC BLOOD PRESSURE: 97 MMHG | HEART RATE: 21 BPM | SYSTOLIC BLOOD PRESSURE: 112 MMHG

## 2021-08-19 DIAGNOSIS — Z79.01 LONG TERM (CURRENT) USE OF ANTICOAGULANTS: ICD-10-CM

## 2021-08-19 DIAGNOSIS — I46.9 CARDIAC ARREST WITH VENTRICULAR FIBRILLATION (HCC): Primary | ICD-10-CM

## 2021-08-19 DIAGNOSIS — I48.91 ATRIAL FIBRILLATION, UNSPECIFIED TYPE (HCC): ICD-10-CM

## 2021-08-19 DIAGNOSIS — I48.20 CHRONIC ATRIAL FIBRILLATION (HCC): ICD-10-CM

## 2021-08-19 DIAGNOSIS — I49.01 CARDIAC ARREST WITH VENTRICULAR FIBRILLATION (HCC): Primary | ICD-10-CM

## 2021-08-19 LAB — INR PPP: 1.8 (ref 0.9–1.1)

## 2021-08-19 PROCEDURE — 85610 PROTHROMBIN TIME: CPT | Performed by: INTERNAL MEDICINE

## 2021-08-19 PROCEDURE — 36416 COLLJ CAPILLARY BLOOD SPEC: CPT | Performed by: INTERNAL MEDICINE

## 2021-08-19 RX ORDER — WARFARIN SODIUM 5 MG/1
TABLET ORAL
Qty: 180 TABLET | Refills: 0 | Status: SHIPPED | OUTPATIENT
Start: 2021-08-19 | End: 2021-09-20 | Stop reason: SDUPTHER

## 2021-08-19 RX ORDER — CARVEDILOL 25 MG/1
25 TABLET ORAL 2 TIMES DAILY
Qty: 60 TABLET | Refills: 3 | Status: SHIPPED | OUTPATIENT
Start: 2021-08-19 | End: 2021-12-17 | Stop reason: SDUPTHER

## 2021-08-19 NOTE — TELEPHONE ENCOUNTER
Rx Refill Note  Requested Prescriptions     Signed Prescriptions Disp Refills   • warfarin (COUMADIN) 5 MG tablet 180 tablet 0     Sig: Take one and one half tablet by mouth Saturday, Sunday, Tuesday and Thursday and 2 tablets by mouth all other days.     Authorizing Provider: ILIANA LOGAN     Ordering User: JOLENE JUAREZ   • carvedilol (COREG) 25 MG tablet 60 tablet 3     Sig: Take 1 tablet by mouth 2 (Two) Times a Day.     Authorizing Provider: ILIANA LOGAN     Ordering User: JOLENE JUAREZ      Last office visit with prescribing clinician: 7/15/2021      Next office visit with prescribing clinician: 2/1/2022       {TIP  Please add Last Relevant Lab Date if appropriate:23}     Anticoagulation Visit (08/19/2021)  Office Visit with Iliana Logan MD (07/15/2021)        Jolene Juarez RN  08/19/21, 16:09 EDT

## 2021-08-22 PROCEDURE — 93296 REM INTERROG EVL PM/IDS: CPT | Performed by: INTERNAL MEDICINE

## 2021-08-22 PROCEDURE — 93295 DEV INTERROG REMOTE 1/2/MLT: CPT | Performed by: INTERNAL MEDICINE

## 2021-09-08 ENCOUNTER — TELEPHONE (OUTPATIENT)
Dept: CARDIOLOGY | Facility: CLINIC | Age: 56
End: 2021-09-08

## 2021-09-08 NOTE — TELEPHONE ENCOUNTER
Pts MURJ report indicated episode of ATP on 9/7/21 for V rate of 194 bpm. MURJ report available for viewing. cjRN

## 2021-09-20 ENCOUNTER — TELEPHONE (OUTPATIENT)
Dept: CARDIOLOGY | Facility: CLINIC | Age: 56
End: 2021-09-20

## 2021-09-20 DIAGNOSIS — I48.20 CHRONIC ATRIAL FIBRILLATION (HCC): ICD-10-CM

## 2021-09-20 DIAGNOSIS — Z79.01 LONG TERM (CURRENT) USE OF ANTICOAGULANTS: ICD-10-CM

## 2021-09-20 RX ORDER — WARFARIN SODIUM 5 MG/1
TABLET ORAL
Qty: 180 TABLET | Refills: 0 | Status: SHIPPED | OUTPATIENT
Start: 2021-09-20 | End: 2021-10-13

## 2021-09-20 NOTE — TELEPHONE ENCOUNTER
Rx Refill Note  Requested Prescriptions     Pending Prescriptions Disp Refills   • warfarin (COUMADIN) 5 MG tablet 180 tablet 0     Sig: Take one and one half tablet by mouth Saturday, Sunday, Tuesday and Thursday and 2 tablets by mouth all other days.      Last office visit with prescribing clinician: 7/15/2021      Next office visit with prescribing clinician: 2/1/2022              Anticoagulation Visit (08/19/2021)    Marcy Malagon LPN  09/20/21, 14:17 EDT

## 2021-09-20 NOTE — TELEPHONE ENCOUNTER
Incoming Refill Request      Medication requested (name and dose): WARFARIN    Pharmacy where request should be sent: [facility]    Additional details provided by patient:     Best call back number: [phone]    Does the patient have less than a 3 day supply:  [x] Yes  [] No    Jaylene Matson Rep  09/20/21, 12:33 EDT

## 2021-09-20 NOTE — TELEPHONE ENCOUNTER
Murj report indicates pt had ATP on 9/18/21 at 12:05 pm lasting 4 mins and 5 secs. Murj report is available for viewing in Epic. cjRN

## 2021-09-21 ENCOUNTER — ANTICOAGULATION VISIT (OUTPATIENT)
Dept: CARDIOLOGY | Facility: CLINIC | Age: 56
End: 2021-09-21

## 2021-09-21 VITALS — WEIGHT: 296 LBS | BODY MASS INDEX: 34.21 KG/M2 | DIASTOLIC BLOOD PRESSURE: 90 MMHG | SYSTOLIC BLOOD PRESSURE: 103 MMHG

## 2021-09-21 DIAGNOSIS — I49.01 CARDIAC ARREST WITH VENTRICULAR FIBRILLATION (HCC): Primary | ICD-10-CM

## 2021-09-21 DIAGNOSIS — E05.90 HYPERTHYROIDISM: ICD-10-CM

## 2021-09-21 DIAGNOSIS — I48.91 ATRIAL FIBRILLATION, UNSPECIFIED TYPE (HCC): ICD-10-CM

## 2021-09-21 DIAGNOSIS — I46.9 CARDIAC ARREST WITH VENTRICULAR FIBRILLATION (HCC): Primary | ICD-10-CM

## 2021-09-21 DIAGNOSIS — Z79.01 LONG TERM (CURRENT) USE OF ANTICOAGULANTS: ICD-10-CM

## 2021-09-21 LAB — INR PPP: 1.6 (ref 0.9–1.1)

## 2021-09-21 PROCEDURE — 36416 COLLJ CAPILLARY BLOOD SPEC: CPT | Performed by: INTERNAL MEDICINE

## 2021-09-21 PROCEDURE — 85610 PROTHROMBIN TIME: CPT | Performed by: INTERNAL MEDICINE

## 2021-09-21 NOTE — TELEPHONE ENCOUNTER
Spoke to pt when he came in for INR today. He was not symptomatic with ATP. He has been doing more physical work at his new job. Was feeling ok at this time. Does note swelling in his legs, however this is not new. Mariza

## 2021-09-22 RX ORDER — METHIMAZOLE 10 MG/1
TABLET ORAL
Qty: 60 TABLET | Refills: 3 | Status: SHIPPED | OUTPATIENT
Start: 2021-09-22

## 2021-09-23 RX ORDER — INSULIN ASPART 100 [IU]/ML
INJECTION, SOLUTION INTRAVENOUS; SUBCUTANEOUS
Qty: 15 ML | Refills: 3 | Status: SHIPPED | OUTPATIENT
Start: 2021-09-23

## 2021-10-12 DIAGNOSIS — I48.20 CHRONIC ATRIAL FIBRILLATION (HCC): ICD-10-CM

## 2021-10-12 DIAGNOSIS — Z79.01 LONG TERM (CURRENT) USE OF ANTICOAGULANTS: ICD-10-CM

## 2021-10-13 RX ORDER — WARFARIN SODIUM 5 MG/1
TABLET ORAL
Qty: 180 TABLET | Refills: 0 | Status: SHIPPED | OUTPATIENT
Start: 2021-10-13

## 2021-10-13 NOTE — TELEPHONE ENCOUNTER
Rx Refill Note  Requested Prescriptions     Pending Prescriptions Disp Refills   • warfarin (COUMADIN) 5 MG tablet [Pharmacy Med Name: WARFARIN SODIUM 5 MG TABLET] 180 tablet 0     Sig: TAKE ONE AND ONE HALF TABLET BY MOUTH SATURDAY, SUNDAY, TUESDAY AND THURSDAY AND 2 TABLETS BY MOUTH ALL OTHER DAYS.      Last office visit with prescribing clinician: 7/15/2021      Next office visit with prescribing clinician: 2/1/2022   Anticoagulation Visit 9/21/21 INR 1.6             Daphney Holt RN  10/13/21, 16:52 EDT

## 2021-10-26 ENCOUNTER — TRANSCRIBE ORDERS (OUTPATIENT)
Dept: ADMINISTRATIVE | Facility: HOSPITAL | Age: 56
End: 2021-10-26

## 2021-10-26 DIAGNOSIS — H53.2 DIPLOPIA: Primary | ICD-10-CM

## 2021-10-29 ENCOUNTER — ANTICOAGULATION VISIT (OUTPATIENT)
Dept: CARDIOLOGY | Facility: CLINIC | Age: 56
End: 2021-10-29

## 2021-10-29 VITALS
SYSTOLIC BLOOD PRESSURE: 117 MMHG | HEART RATE: 106 BPM | WEIGHT: 299 LBS | DIASTOLIC BLOOD PRESSURE: 97 MMHG | BODY MASS INDEX: 34.55 KG/M2

## 2021-10-29 DIAGNOSIS — Z79.01 LONG TERM (CURRENT) USE OF ANTICOAGULANTS: ICD-10-CM

## 2021-10-29 DIAGNOSIS — I48.91 ATRIAL FIBRILLATION, UNSPECIFIED TYPE (HCC): ICD-10-CM

## 2021-10-29 DIAGNOSIS — I49.01 CARDIAC ARREST WITH VENTRICULAR FIBRILLATION (HCC): Primary | ICD-10-CM

## 2021-10-29 DIAGNOSIS — I46.9 CARDIAC ARREST WITH VENTRICULAR FIBRILLATION (HCC): Primary | ICD-10-CM

## 2021-10-29 LAB — INR PPP: 1.9 (ref 0.9–1.1)

## 2021-10-29 PROCEDURE — 36416 COLLJ CAPILLARY BLOOD SPEC: CPT | Performed by: INTERNAL MEDICINE

## 2021-10-29 PROCEDURE — 85610 PROTHROMBIN TIME: CPT | Performed by: INTERNAL MEDICINE

## 2021-11-21 PROCEDURE — 93295 DEV INTERROG REMOTE 1/2/MLT: CPT | Performed by: INTERNAL MEDICINE

## 2021-11-21 PROCEDURE — 93296 REM INTERROG EVL PM/IDS: CPT | Performed by: INTERNAL MEDICINE

## 2021-12-09 ENCOUNTER — APPOINTMENT (OUTPATIENT)
Dept: MRI IMAGING | Facility: HOSPITAL | Age: 56
End: 2021-12-09

## 2021-12-14 ENCOUNTER — TELEPHONE (OUTPATIENT)
Dept: CARDIOLOGY | Facility: CLINIC | Age: 56
End: 2021-12-14

## 2021-12-14 DIAGNOSIS — I10 ESSENTIAL HYPERTENSION: Primary | ICD-10-CM

## 2021-12-14 DIAGNOSIS — I49.01 VENTRICULAR FIBRILLATION (HCC): ICD-10-CM

## 2021-12-14 NOTE — TELEPHONE ENCOUNTER
Patient had remote on 12/11/21 VT with ATP and multiple NST episodes lasting from 5m, 46s to 12s.. Please advise.    Dr Rodrigez ordered Mg level and BMP with OV.

## 2021-12-17 RX ORDER — CARVEDILOL 25 MG/1
25 TABLET ORAL 2 TIMES DAILY
Qty: 60 TABLET | Refills: 3 | Status: SHIPPED | OUTPATIENT
Start: 2021-12-17

## 2021-12-17 NOTE — TELEPHONE ENCOUNTER
Rx Refill Note  Requested Prescriptions     Pending Prescriptions Disp Refills   • carvedilol (COREG) 25 MG tablet 60 tablet 3     Sig: Take 1 tablet by mouth 2 (Two) Times a Day.      Last office visit with prescribing clinician: 7/15/2021      Next office visit with prescribing clinician: 2/1/2022            Kelsea Mckenzie MA  12/17/21, 16:01 EST

## 2021-12-17 NOTE — TELEPHONE ENCOUNTER
Incoming Refill Request      Medication requested (name and dose): CARVEDILOL 25MG     Pharmacy where request should be sent: [facility]    Additional details provided by patient:    Best call back number:[phone]    Does the patient have less than a 3 day supply:  [x] Yes  [] No    Jaylene Walls Rep  12/17/21, 11:41 EST

## 2021-12-20 ENCOUNTER — ANTICOAGULATION VISIT (OUTPATIENT)
Dept: CARDIOLOGY | Facility: CLINIC | Age: 56
End: 2021-12-20

## 2021-12-20 VITALS
HEART RATE: 64 BPM | WEIGHT: 300 LBS | BODY MASS INDEX: 34.67 KG/M2 | SYSTOLIC BLOOD PRESSURE: 120 MMHG | DIASTOLIC BLOOD PRESSURE: 96 MMHG

## 2021-12-20 DIAGNOSIS — Z79.01 LONG TERM (CURRENT) USE OF ANTICOAGULANTS: ICD-10-CM

## 2021-12-20 DIAGNOSIS — I48.91 ATRIAL FIBRILLATION, UNSPECIFIED TYPE (HCC): ICD-10-CM

## 2021-12-20 DIAGNOSIS — I46.9 CARDIAC ARREST WITH VENTRICULAR FIBRILLATION (HCC): Primary | ICD-10-CM

## 2021-12-20 DIAGNOSIS — I49.01 CARDIAC ARREST WITH VENTRICULAR FIBRILLATION (HCC): Primary | ICD-10-CM

## 2021-12-20 LAB — INR PPP: 1.9 (ref 0.9–1.1)

## 2021-12-20 PROCEDURE — 85610 PROTHROMBIN TIME: CPT | Performed by: INTERNAL MEDICINE

## 2021-12-20 PROCEDURE — 36416 COLLJ CAPILLARY BLOOD SPEC: CPT | Performed by: INTERNAL MEDICINE

## 2021-12-20 NOTE — TELEPHONE ENCOUNTER
Patient has an appt on 2/1/22, he is changing jobs and will not have insurance until first of the year.

## 2021-12-20 NOTE — PROGRESS NOTES
INR was within normal limits, no change in treatment plan at this time. Recheck in a month. Mariza

## 2022-01-07 ENCOUNTER — APPOINTMENT (OUTPATIENT)
Dept: MRI IMAGING | Facility: HOSPITAL | Age: 57
End: 2022-01-07

## 2022-02-20 PROCEDURE — 93296 REM INTERROG EVL PM/IDS: CPT | Performed by: INTERNAL MEDICINE

## 2022-02-20 PROCEDURE — 93295 DEV INTERROG REMOTE 1/2/MLT: CPT | Performed by: INTERNAL MEDICINE

## 2024-05-13 NOTE — PROGRESS NOTES
Continued Stay Note  [facility]     Patient Name: Shannan Arguelles  MRN: [id number]  Today's Date: 3/14/2018    Admit Date: 3/11/2018          Discharge Plan     Row Name 03/14/18 1444       Plan    Plan Comments CCP spoke with Jessica broderick [facility].  She has spoken to pt and he does not qualify for [place] Medicaid.  Pt has no health insurance.  CCP will monitor for pt's ability to afford new therapies and provide resources. Pt is employed as a  for [employer].  He denies difficulty paying for his medications at this time.     Row Name 03/14/18 1434       Plan    Plan Undetermined vs Home with family assist.    Patient/Family in Agreement with Plan yes    Plan Comments Spoke with pt at bedside.  Introduced self and explained role.  CCP contact information provided.  Face sheet and pharmacy verified.  Pt was independent prior to admission.  He states that he cares for his mother who lives with him.  His brother is now caring her her.  He denies any prior use of HH or SNF.  He states that his plan is to return home at discharge.  He denies the need for services at present.  CCP will continue to follow and assess for skilled needs.              Discharge Codes    No documentation.           Lili Cabrales RN     111

## (undated) DEVICE — LIMB HOLDER, WRIST/ANKLE: Brand: DEROYAL

## (undated) DEVICE — LOU PACE DEFIB: Brand: MEDLINE INDUSTRIES, INC.

## (undated) DEVICE — ELECTRD ECG CARBON/SNP RL FM A/ 5PK

## (undated) DEVICE — KT MANIFLD CARDIAC

## (undated) DEVICE — INTRO SHEATH PRELUDE SNAP .038 9F 13CM W/SDPRT BLK

## (undated) DEVICE — CATH DIAG IMPULSE FL3.5 5F 100CM

## (undated) DEVICE — PK CATH CARD 40

## (undated) DEVICE — GW EMR FIX EXCHG J STD .035 3MM 260CM

## (undated) DEVICE — GLIDESHEATH BASIC HYDROPHILIC COATED INTRODUCER SHEATH: Brand: GLIDESHEATH

## (undated) DEVICE — BALN PRESS WEDGE 5F 110CM

## (undated) DEVICE — Device

## (undated) DEVICE — CATH VENT MIV RADL PIG ST TIP 4F 110CM

## (undated) DEVICE — SOL IRR NACL 0.9PCT BT 1000ML